# Patient Record
Sex: FEMALE | Race: WHITE | Employment: OTHER | URBAN - METROPOLITAN AREA
[De-identification: names, ages, dates, MRNs, and addresses within clinical notes are randomized per-mention and may not be internally consistent; named-entity substitution may affect disease eponyms.]

---

## 2017-03-16 ENCOUNTER — OFFICE VISIT (OUTPATIENT)
Dept: OBGYN | Age: 67
End: 2017-03-16

## 2017-03-16 VITALS
DIASTOLIC BLOOD PRESSURE: 78 MMHG | SYSTOLIC BLOOD PRESSURE: 126 MMHG | HEIGHT: 67 IN | BODY MASS INDEX: 39.87 KG/M2 | WEIGHT: 254 LBS

## 2017-03-16 DIAGNOSIS — N89.8 VAGINAL DISCHARGE: Primary | ICD-10-CM

## 2017-03-16 PROCEDURE — 99213 OFFICE O/P EST LOW 20 MIN: CPT | Performed by: OBSTETRICS & GYNECOLOGY

## 2017-03-16 RX ORDER — FLUCONAZOLE 150 MG/1
150 TABLET ORAL ONCE
Qty: 1 TABLET | Refills: 1 | Status: SHIPPED | OUTPATIENT
Start: 2017-03-16 | End: 2017-03-16

## 2017-03-16 RX ORDER — CLINDAMYCIN PHOSPHATE 20 MG/G
CREAM VAGINAL
Qty: 1 TUBE | Refills: 0 | Status: SHIPPED | OUTPATIENT
Start: 2017-03-16 | End: 2017-03-23

## 2017-03-16 RX ORDER — MULTIVIT WITH MINERALS/LUTEIN
1000 TABLET ORAL DAILY
COMMUNITY

## 2017-03-27 DIAGNOSIS — N89.8 VAGINAL DISCHARGE: ICD-10-CM

## 2017-07-31 ENCOUNTER — HOSPITAL ENCOUNTER (OUTPATIENT)
Dept: PHYSICAL THERAPY | Age: 67
Setting detail: THERAPIES SERIES
Discharge: HOME | End: 2017-07-31
Payer: MEDICARE

## 2017-07-31 PROCEDURE — G8979 MOBILITY GOAL STATUS: HCPCS

## 2017-07-31 PROCEDURE — 97161 PT EVAL LOW COMPLEX 20 MIN: CPT

## 2017-07-31 PROCEDURE — 97110 THERAPEUTIC EXERCISES: CPT

## 2017-07-31 PROCEDURE — G8978 MOBILITY CURRENT STATUS: HCPCS

## 2017-07-31 ASSESSMENT — PAIN DESCRIPTION - PAIN TYPE: TYPE: CHRONIC PAIN

## 2017-07-31 ASSESSMENT — PAIN DESCRIPTION - ORIENTATION: ORIENTATION: RIGHT;OUTER

## 2017-07-31 ASSESSMENT — PAIN DESCRIPTION - DESCRIPTORS: DESCRIPTORS: ACHING

## 2017-07-31 ASSESSMENT — PAIN DESCRIPTION - DIRECTION: RADIATING_TOWARDS: DENIES N/T

## 2017-07-31 ASSESSMENT — PAIN DESCRIPTION - LOCATION: LOCATION: HIP

## 2017-08-08 ENCOUNTER — HOSPITAL ENCOUNTER (OUTPATIENT)
Dept: PHYSICAL THERAPY | Age: 67
Setting detail: THERAPIES SERIES
Discharge: HOME | End: 2017-08-08
Payer: MEDICARE

## 2017-08-08 PROCEDURE — 97110 THERAPEUTIC EXERCISES: CPT

## 2017-08-11 ENCOUNTER — HOSPITAL ENCOUNTER (OUTPATIENT)
Dept: PHYSICAL THERAPY | Age: 67
Setting detail: THERAPIES SERIES
Discharge: HOME | End: 2017-08-11
Payer: MEDICARE

## 2017-08-11 PROCEDURE — 97140 MANUAL THERAPY 1/> REGIONS: CPT

## 2017-08-11 PROCEDURE — 97110 THERAPEUTIC EXERCISES: CPT

## 2017-08-11 ASSESSMENT — PAIN DESCRIPTION - LOCATION: LOCATION: HIP

## 2017-08-11 ASSESSMENT — PAIN DESCRIPTION - DESCRIPTORS: DESCRIPTORS: ACHING

## 2017-08-11 ASSESSMENT — PAIN SCALES - GENERAL: PAINLEVEL_OUTOF10: 4

## 2017-08-11 ASSESSMENT — PAIN DESCRIPTION - ORIENTATION: ORIENTATION: RIGHT

## 2017-08-11 ASSESSMENT — PAIN DESCRIPTION - PAIN TYPE: TYPE: CHRONIC PAIN

## 2017-08-14 ENCOUNTER — HOSPITAL ENCOUNTER (OUTPATIENT)
Dept: PHYSICAL THERAPY | Age: 67
Setting detail: THERAPIES SERIES
Discharge: HOME | End: 2017-08-14
Payer: MEDICARE

## 2017-08-14 PROCEDURE — 97110 THERAPEUTIC EXERCISES: CPT

## 2017-08-14 ASSESSMENT — PAIN DESCRIPTION - DESCRIPTORS: DESCRIPTORS: ACHING

## 2017-08-14 ASSESSMENT — PAIN DESCRIPTION - ORIENTATION: ORIENTATION: RIGHT

## 2017-08-14 ASSESSMENT — PAIN DESCRIPTION - LOCATION: LOCATION: HIP

## 2017-08-16 ENCOUNTER — HOSPITAL ENCOUNTER (OUTPATIENT)
Dept: PHYSICAL THERAPY | Age: 67
Setting detail: THERAPIES SERIES
Discharge: HOME | End: 2017-08-16
Payer: MEDICARE

## 2017-08-16 PROCEDURE — 97110 THERAPEUTIC EXERCISES: CPT

## 2017-08-16 PROCEDURE — G8979 MOBILITY GOAL STATUS: HCPCS

## 2017-08-16 PROCEDURE — G8980 MOBILITY D/C STATUS: HCPCS

## 2017-08-16 ASSESSMENT — PAIN DESCRIPTION - DESCRIPTORS: DESCRIPTORS: ACHING

## 2017-08-16 ASSESSMENT — PAIN SCALES - GENERAL: PAINLEVEL_OUTOF10: 4

## 2017-08-16 ASSESSMENT — PAIN DESCRIPTION - LOCATION: LOCATION: HIP

## 2017-08-16 ASSESSMENT — PAIN DESCRIPTION - PAIN TYPE: TYPE: CHRONIC PAIN

## 2017-08-16 ASSESSMENT — PAIN DESCRIPTION - ORIENTATION: ORIENTATION: RIGHT

## 2017-08-23 ENCOUNTER — APPOINTMENT (OUTPATIENT)
Dept: PHYSICAL THERAPY | Age: 67
End: 2017-08-23
Payer: MEDICARE

## 2017-08-25 ENCOUNTER — APPOINTMENT (OUTPATIENT)
Dept: PHYSICAL THERAPY | Age: 67
End: 2017-08-25
Payer: MEDICARE

## 2017-08-30 ENCOUNTER — APPOINTMENT (OUTPATIENT)
Dept: PHYSICAL THERAPY | Age: 67
End: 2017-08-30
Payer: MEDICARE

## 2018-02-27 ENCOUNTER — OFFICE VISIT (OUTPATIENT)
Dept: OBGYN CLINIC | Age: 68
End: 2018-02-27
Payer: MEDICARE

## 2018-02-27 VITALS
BODY MASS INDEX: 39.8 KG/M2 | HEIGHT: 67 IN | DIASTOLIC BLOOD PRESSURE: 68 MMHG | WEIGHT: 253.6 LBS | SYSTOLIC BLOOD PRESSURE: 124 MMHG

## 2018-02-27 DIAGNOSIS — Z11.51 SCREENING FOR HPV (HUMAN PAPILLOMAVIRUS): ICD-10-CM

## 2018-02-27 DIAGNOSIS — Z01.419 WELL WOMAN EXAM WITH ROUTINE GYNECOLOGICAL EXAM: Primary | ICD-10-CM

## 2018-02-27 DIAGNOSIS — Z78.0 POSTMENOPAUSAL: ICD-10-CM

## 2018-02-27 DIAGNOSIS — Z12.31 SCREENING MAMMOGRAM, ENCOUNTER FOR: ICD-10-CM

## 2018-02-27 PROCEDURE — G8417 CALC BMI ABV UP PARAM F/U: HCPCS | Performed by: OBSTETRICS & GYNECOLOGY

## 2018-02-27 PROCEDURE — G8427 DOCREV CUR MEDS BY ELIG CLIN: HCPCS | Performed by: OBSTETRICS & GYNECOLOGY

## 2018-02-27 PROCEDURE — G0101 CA SCREEN;PELVIC/BREAST EXAM: HCPCS | Performed by: OBSTETRICS & GYNECOLOGY

## 2018-02-27 ASSESSMENT — PATIENT HEALTH QUESTIONNAIRE - PHQ9
2. FEELING DOWN, DEPRESSED OR HOPELESS: 0
SUM OF ALL RESPONSES TO PHQ9 QUESTIONS 1 & 2: 0
1. LITTLE INTEREST OR PLEASURE IN DOING THINGS: 0
SUM OF ALL RESPONSES TO PHQ QUESTIONS 1-9: 0

## 2018-02-27 ASSESSMENT — ENCOUNTER SYMPTOMS
COLOR CHANGE: 0
CONSTIPATION: 0
SINUS PRESSURE: 0
WHEEZING: 0
NAUSEA: 0
BLOOD IN STOOL: 0
ABDOMINAL PAIN: 0
COUGH: 0
SHORTNESS OF BREATH: 0
VOMITING: 0

## 2018-02-27 NOTE — PROGRESS NOTES
(PRINZIDE;ZESTORETIC) 10-12.5 MG per tablet       vitamin D (CHOLECALCIFEROL) 1000 UNIT TABS tablet Take 1,000 Units by mouth daily      Calcium Carbonate-Vit D-Min (CALCIUM 1200 PO) Take by mouth      aspirin 81 MG tablet Take 81 mg by mouth daily       No current facility-administered medications on file prior to visit. ALLERGIES:  Allergies as of 02/27/2018 - Review Complete 02/27/2018   Allergen Reaction Noted    Betadine [povidone iodine] Hives 06/25/2015    Sulfa antibiotics Hives 06/25/2015    Nickel Rash 06/25/2015           Gynecologic History:     No LMP recorded. Patient is postmenopausal.      ________________________________________________________________________  REVIEW OF SYSTEMS:       Review of Systems   Constitutional: Negative for chills, fatigue, fever and unexpected weight change. HENT: Negative for hearing loss, sinus pressure and tinnitus. Eyes: Negative for visual disturbance. Respiratory: Negative for cough, shortness of breath and wheezing. Cardiovascular: Negative for chest pain, palpitations and leg swelling. Gastrointestinal: Negative for abdominal pain, blood in stool, constipation, nausea and vomiting. Genitourinary: Negative for difficulty urinating, dysuria, flank pain, hematuria, pelvic pain and vaginal discharge. Musculoskeletal: Negative for arthralgias and neck stiffness. Skin: Negative for color change, pallor and rash. Allergic/Immunologic: Negative for food allergies. Neurological: Negative for dizziness, speech difficulty, weakness and numbness. Psychiatric/Behavioral: Negative for behavioral problems, self-injury and suicidal ideas. The patient is not nervous/anxious.                                                                                                                                                    PHYSICAL Exam:    Constitutional:  Vitals:    02/27/18 1344   BP: 124/68   Weight: 253 lb 9.6 oz (115 kg)   Height: 5' 7\"

## 2018-02-28 LAB — PAP SMEAR: NORMAL

## 2018-03-02 ENCOUNTER — HOSPITAL ENCOUNTER (OUTPATIENT)
Dept: WOMENS IMAGING | Age: 68
Discharge: HOME | End: 2018-03-04
Payer: MEDICARE

## 2018-03-02 DIAGNOSIS — Z78.0 POSTMENOPAUSAL: ICD-10-CM

## 2018-03-02 DIAGNOSIS — Z12.31 SCREENING MAMMOGRAM, ENCOUNTER FOR: ICD-10-CM

## 2018-03-02 PROCEDURE — 77080 DXA BONE DENSITY AXIAL: CPT

## 2018-03-02 PROCEDURE — 77067 SCR MAMMO BI INCL CAD: CPT

## 2018-03-14 DIAGNOSIS — Z11.51 SCREENING FOR HPV (HUMAN PAPILLOMAVIRUS): ICD-10-CM

## 2018-03-14 DIAGNOSIS — Z01.419 WELL WOMAN EXAM WITH ROUTINE GYNECOLOGICAL EXAM: ICD-10-CM

## 2018-03-16 ENCOUNTER — TELEPHONE (OUTPATIENT)
Dept: OBGYN CLINIC | Age: 68
End: 2018-03-16

## 2019-04-16 ENCOUNTER — OFFICE VISIT (OUTPATIENT)
Dept: OBGYN CLINIC | Age: 69
End: 2019-04-16
Payer: MEDICARE

## 2019-04-16 VITALS
WEIGHT: 251 LBS | SYSTOLIC BLOOD PRESSURE: 108 MMHG | DIASTOLIC BLOOD PRESSURE: 70 MMHG | HEIGHT: 67 IN | BODY MASS INDEX: 39.39 KG/M2

## 2019-04-16 DIAGNOSIS — Z12.31 SCREENING MAMMOGRAM, ENCOUNTER FOR: ICD-10-CM

## 2019-04-16 DIAGNOSIS — Z01.419 ENCOUNTER FOR WELL WOMAN EXAM: Primary | ICD-10-CM

## 2019-04-16 PROCEDURE — G0101 CA SCREEN;PELVIC/BREAST EXAM: HCPCS | Performed by: OBSTETRICS & GYNECOLOGY

## 2019-04-16 ASSESSMENT — ENCOUNTER SYMPTOMS
VOMITING: 0
ANAL BLEEDING: 0
EYES NEGATIVE: 1
ABDOMINAL DISTENTION: 0
CONSTIPATION: 0
RESPIRATORY NEGATIVE: 1
ABDOMINAL PAIN: 0
NAUSEA: 0
BLOOD IN STOOL: 0
RECTAL PAIN: 0
ALLERGIC/IMMUNOLOGIC NEGATIVE: 1
DIARRHEA: 0

## 2019-04-16 ASSESSMENT — PATIENT HEALTH QUESTIONNAIRE - PHQ9
SUM OF ALL RESPONSES TO PHQ QUESTIONS 1-9: 0
2. FEELING DOWN, DEPRESSED OR HOPELESS: 0
SUM OF ALL RESPONSES TO PHQ QUESTIONS 1-9: 0

## 2019-04-16 NOTE — PROGRESS NOTES
per session: Not on file    Stress: Not on file   Relationships    Social connections:     Talks on phone: Not on file     Gets together: Not on file     Attends Gnosticism service: Not on file     Active member of club or organization: Not on file     Attends meetings of clubs or organizations: Not on file     Relationship status: Not on file    Intimate partner violence:     Fear of current or ex partner: Not on file     Emotionally abused: Not on file     Physically abused: Not on file     Forced sexual activity: Not on file   Other Topics Concern    Not on file   Social History Narrative    Not on file     Family History   Problem Relation Age of Onset    Cancer Father     Cancer Mother     Breast Cancer Neg Hx     Colon Cancer Neg Hx     Diabetes Neg Hx     Eclampsia Neg Hx     Hypertension Neg Hx     Ovarian Cancer Neg Hx      Labor Neg Hx     Spont Abortions Neg Hx     Stroke Neg Hx     ADHD Neg Hx     Alcohol Abuse Neg Hx     Allergic Rhinitis Neg Hx     Allergies Neg Hx     Allergy (Severe) Neg Hx     Alzheimer's Disease Neg Hx     Amblyopia Neg Hx     Anemia Neg Hx     Anesth Problems Neg Hx     Angioedema Neg Hx     Anxiety Disorder Neg Hx     Arrhythmia Neg Hx     Arthritis Neg Hx     Asthma Neg Hx     Ataxia Neg Hx     Atopy Neg Hx     Bipolar Disorder Neg Hx     Birth Defects Neg Hx     Bleeding Prob Neg Hx     Blindness Neg Hx     Brain Cancer Neg Hx     BRCA 1/2 Neg Hx     Broken Bones Neg Hx     Cataracts Neg Hx     Celiac Disease Neg Hx     Cervical Cancer Neg Hx     Chdhd Hrt Surg Neg Hx     Chdhd Resp Dis Neg Hx     Chorea Neg Hx     Chronic Infections Neg Hx     Cirrhosis Neg Hx     Clotting Disorder Neg Hx     Collagen Disease Neg Hx     Colon Polyps Neg Hx     COPD Neg Hx     Coronary Art Dis Neg Hx     Cowden Syndrome Neg Hx     Crohn's Disease Neg Hx     Cystic Fibrosis Neg Hx     Dementia Neg Hx     Depression Neg Hx     LESLEY Usage Neg Hx     Dislocations Neg Hx     Down Syndrome Neg Hx     Drug Abuse Neg Hx     Early Death Neg Hx     Eczema Neg Hx     Elevated Lipids Neg Hx     Emphysema Neg Hx     Endometrial Cancer Neg Hx     Esophageal Cancer Neg Hx     Fainting Neg Hx     Glaucoma Neg Hx      Problems Neg Hx     Hearing Loss Neg Hx     Heart Attack Neg Hx     Heart Defect Neg Hx     Heart Disease Neg Hx     Heart Failure Neg Hx     Heart Surgery Neg Hx     Hemochromatosis Neg Hx     High Blood Pressure Neg Hx     High Cholesterol Neg Hx     Hypotension Neg Hx     Immunodeficiency Neg Hx     Infertility Neg Hx     Inflam Bowel Dis Neg Hx     Irritable Bowel Syndrome Neg Hx     Kidney Cancer Neg Hx     Kidney Disease Neg Hx     Learning Disabilities Neg Hx     Li-Fraumeni Syndrome Neg Hx     Liver Cancer Neg Hx     Liver Disease Neg Hx     Lung Cancer Neg Hx     Lupus Neg Hx     Macular Degen Neg Hx     Malig Hyperten Neg Hx     Malig Hypertherm Neg Hx     Marfan Syndrome Neg Hx     Memory Loss Neg Hx     Mental Illness Neg Hx     Mental Retardation Neg Hx     Migraines Neg Hx     Miscarriages / Stillbirths Neg Hx     Mult Sclerosis Neg Hx     Neurofibromatosis Neg Hx     Neuropathy Neg Hx     Obesity Neg Hx     OCD Neg Hx     Osteoarthritis Neg Hx     Osteoporosis Neg Hx     Other Neg Hx     Pacemaker Neg Hx     Paranoid Behavior Neg Hx     Parkinsonism Neg Hx     Physical Abuse Neg Hx     PKU Neg Hx     Premature Birth Neg Hx     Prostate Cancer Neg Hx     Pseudochol.  Deficiency Neg Hx     Psoriasis Neg Hx     Rashes/Skin Problems Neg Hx     Rectal Cancer Neg Hx     Retinal Detachment Neg Hx     Rheum Arthritis Neg Hx     Rheumatologic Disease Neg Hx     Schizophrenia Neg Hx     Scoliosis Neg Hx     Seizures Neg Hx     Severe Sprains Neg Hx     Sexual Abuse Neg Hx     Sickle Cell Anemia Neg Hx     Sickle Cell Trait Neg Hx     SIDS Neg Hx     Stillborn Neg Hx  Stomach Cancer Neg Hx     Strabismus Neg Hx     Substance Abuse Neg Hx     Sudden Death Neg Hx     Thyroid Cancer Neg Hx     Thyroid Disease Neg Hx     Tuberculosis Neg Hx     Ulcerative Colitis Neg Hx     Urolithiasis Neg Hx     Urticaria Neg Hx     Uterine Cancer Neg Hx     Vaginal Cancer Neg Hx     Vision Loss Neg Hx     Lio's Disease Neg Hx        Review of Systems   Constitutional: Negative. Negative for activity change, appetite change, chills, diaphoresis, fatigue, fever and unexpected weight change. HENT: Negative. Eyes: Negative. Respiratory: Negative. Cardiovascular: Negative. Gastrointestinal: Negative for abdominal distention, abdominal pain, anal bleeding, blood in stool, constipation, diarrhea, nausea, rectal pain and vomiting. Endocrine: Negative. Genitourinary: Negative for decreased urine volume, difficulty urinating, dyspareunia, dysuria, enuresis, flank pain, frequency, genital sores, hematuria, menstrual problem, pelvic pain, urgency, vaginal bleeding, vaginal discharge and vaginal pain. Musculoskeletal: Negative. Skin: Negative. Allergic/Immunologic: Negative. Neurological: Negative. Hematological: Negative. Psychiatric/Behavioral: Negative. Objective:     Physical Exam   Constitutional: She is oriented to person, place, and time. She appears well-developed and well-nourished. HENT:   Head: Normocephalic. Neck: Normal range of motion. Neck supple. No thyromegaly present. Cardiovascular: Normal rate, regular rhythm and normal heart sounds. Pulmonary/Chest: Effort normal and breath sounds normal. No respiratory distress. She has no wheezes. She has no rales. She exhibits no tenderness. Right breast exhibits no mass, no nipple discharge, no skin change and no tenderness. Left breast exhibits no mass, no nipple discharge, no skin change and no tenderness. No breast swelling, tenderness, discharge or bleeding. Abdominal: Soft. Bowel sounds are normal. She exhibits no distension and no mass. There is no tenderness. There is no rebound and no guarding. Hernia confirmed negative in the right inguinal area and confirmed negative in the left inguinal area. Genitourinary: Rectum normal, vagina normal and uterus normal. No breast swelling, tenderness, discharge or bleeding. No labial fusion. There is no rash, tenderness, lesion or injury on the right labia. There is no rash, tenderness, lesion or injury on the left labia. Uterus is not deviated, not enlarged, not fixed and not tender. Cervix exhibits no motion tenderness, no discharge and no friability. Right adnexum displays no mass, no tenderness and no fullness. Left adnexum displays no mass, no tenderness and no fullness. No erythema, tenderness or bleeding in the vagina. No foreign body in the vagina. No signs of injury around the vagina. No vaginal discharge found. Musculoskeletal: Normal range of motion. She exhibits no edema or tenderness. Lymphadenopathy:     She has no cervical adenopathy. Right: No inguinal adenopathy present. Left: No inguinal adenopathy present. Neurological: She is alert and oriented to person, place, and time. Skin: Skin is warm and dry. No rash noted. No erythema. No pallor. Psychiatric: She has a normal mood and affect. Her behavior is normal. Judgment and thought content normal.       Assessment:      Diagnosis Orders   1. Encounter for well woman exam     2. Screening mammogram, encounter for  MICHAEL DIGITAL SCREEN W CAD BILATERAL         Plan:      Medications placedthis encounter:  No orders of the defined types were placed in this encounter.         Orders placedthis encounter:  Orders Placed This Encounter   Procedures    MICHAEL DIGITAL SCREEN W CAD BILATERAL     Standing Status:   Future     Standing Expiration Date:   4/15/2020         Follow up:  Return in about 1 year (around 4/16/2020) for Annual.

## 2019-04-16 NOTE — PROGRESS NOTES
The patient was asked if she would like a chaperone present for her intimate exam. She  Declined the chaperone.  Denise Banda

## 2019-04-19 ENCOUNTER — HOSPITAL ENCOUNTER (OUTPATIENT)
Dept: WOMENS IMAGING | Age: 69
Discharge: HOME | End: 2019-04-21
Payer: MEDICARE

## 2019-04-19 DIAGNOSIS — Z12.31 SCREENING MAMMOGRAM, ENCOUNTER FOR: ICD-10-CM

## 2019-04-19 PROCEDURE — 77067 SCR MAMMO BI INCL CAD: CPT

## 2020-04-28 ENCOUNTER — OFFICE VISIT (OUTPATIENT)
Dept: OBGYN CLINIC | Age: 70
End: 2020-04-28
Payer: MEDICARE

## 2020-04-28 VITALS
SYSTOLIC BLOOD PRESSURE: 132 MMHG | WEIGHT: 250 LBS | BODY MASS INDEX: 39.24 KG/M2 | DIASTOLIC BLOOD PRESSURE: 68 MMHG | HEIGHT: 67 IN

## 2020-04-28 PROCEDURE — G0101 CA SCREEN;PELVIC/BREAST EXAM: HCPCS | Performed by: OBSTETRICS & GYNECOLOGY

## 2020-04-28 ASSESSMENT — ENCOUNTER SYMPTOMS
EYES NEGATIVE: 1
RECTAL PAIN: 0
RESPIRATORY NEGATIVE: 1
NAUSEA: 0
BLOOD IN STOOL: 0
ABDOMINAL PAIN: 0
DIARRHEA: 0
CONSTIPATION: 0
ANAL BLEEDING: 0
VOMITING: 0
ABDOMINAL DISTENTION: 0
ALLERGIC/IMMUNOLOGIC NEGATIVE: 1

## 2020-04-28 ASSESSMENT — PATIENT HEALTH QUESTIONNAIRE - PHQ9
SUM OF ALL RESPONSES TO PHQ QUESTIONS 1-9: 0
SUM OF ALL RESPONSES TO PHQ9 QUESTIONS 1 & 2: 0
1. LITTLE INTEREST OR PLEASURE IN DOING THINGS: 0
SUM OF ALL RESPONSES TO PHQ QUESTIONS 1-9: 0
2. FEELING DOWN, DEPRESSED OR HOPELESS: 0

## 2020-04-28 NOTE — PROGRESS NOTES
Subjective:      Patient ID: Mello Colbert is a 68 y.o. female    Annual exam.  No PMB. No GYN complaints. Pap deferred. Screening mammogram ordered. Monthly SBE encouraged. Dexa scan ordered per protocol. Screening colonoscopy recommended per routine  . F/U annual exam or prn. Vitals:  Ht 5' 7\" (1.702 m)   Wt 250 lb (113.4 kg)   BMI 39.16 kg/m²   Past Medical History:   Diagnosis Date    Hypertension      Past Surgical History:   Procedure Laterality Date    COLONOSCOPY  02/20/2018    TUBAL LIGATION       Allergies:  Betadine [povidone iodine]; Sulfa antibiotics; and Nickel  Current Outpatient Medications   Medication Sig Dispense Refill    vitamin E 1000 UNITS capsule Take 1,000 Units by mouth daily      gabapentin (NEURONTIN) 100 MG capsule TK 1 C PO HS. MAY. INCREASE TO 3 CS HS AS TOLERATED  0    lisinopril-hydrochlorothiazide (PRINZIDE;ZESTORETIC) 10-12.5 MG per tablet       vitamin D (CHOLECALCIFEROL) 1000 UNIT TABS tablet Take 1,000 Units by mouth daily      Calcium Carbonate-Vit D-Min (CALCIUM 1200 PO) Take by mouth      aspirin 81 MG tablet Take 81 mg by mouth daily       No current facility-administered medications for this visit. Social History     Socioeconomic History    Marital status:      Spouse name: Not on file    Number of children: Not on file    Years of education: Not on file    Highest education level: Not on file   Occupational History    Not on file   Social Needs    Financial resource strain: Not on file    Food insecurity     Worry: Not on file     Inability: Not on file    Transportation needs     Medical: Not on file     Non-medical: Not on file   Tobacco Use    Smoking status: Former Smoker    Smokeless tobacco: Never Used   Substance and Sexual Activity    Alcohol use:  Yes     Alcohol/week: 0.0 standard drinks    Drug use: No    Sexual activity: Not Currently   Lifestyle    Physical activity     Days per week: Not on file     Minutes Usage Neg Hx     Dislocations Neg Hx     Down Syndrome Neg Hx     Drug Abuse Neg Hx     Early Death Neg Hx     Eczema Neg Hx     Elevated Lipids Neg Hx     Emphysema Neg Hx     Endometrial Cancer Neg Hx     Esophageal Cancer Neg Hx     Fainting Neg Hx     Glaucoma Neg Hx      Problems Neg Hx     Hearing Loss Neg Hx     Heart Attack Neg Hx     Heart Defect Neg Hx     Heart Disease Neg Hx     Heart Failure Neg Hx     Heart Surgery Neg Hx     Hemochromatosis Neg Hx     High Blood Pressure Neg Hx     High Cholesterol Neg Hx     Hypotension Neg Hx     Immunodeficiency Neg Hx     Infertility Neg Hx     Inflam Bowel Dis Neg Hx     Irritable Bowel Syndrome Neg Hx     Kidney Cancer Neg Hx     Kidney Disease Neg Hx     Learning Disabilities Neg Hx     Li-Fraumeni Syndrome Neg Hx     Liver Cancer Neg Hx     Liver Disease Neg Hx     Lung Cancer Neg Hx     Lupus Neg Hx     Macular Degen Neg Hx     Malig Hyperten Neg Hx     Malig Hypertherm Neg Hx     Marfan Syndrome Neg Hx     Memory Loss Neg Hx     Mental Illness Neg Hx     Mental Retardation Neg Hx     Migraines Neg Hx     Miscarriages / Stillbirths Neg Hx     Mult Sclerosis Neg Hx     Neurofibromatosis Neg Hx     Neuropathy Neg Hx     Obesity Neg Hx     OCD Neg Hx     Osteoarthritis Neg Hx     Osteoporosis Neg Hx     Other Neg Hx     Pacemaker Neg Hx     Paranoid Behavior Neg Hx     Parkinsonism Neg Hx     Physical Abuse Neg Hx     PKU Neg Hx     Premature Birth Neg Hx     Prostate Cancer Neg Hx     Pseudochol.  Deficiency Neg Hx     Psoriasis Neg Hx     Rashes/Skin Problems Neg Hx     Rectal Cancer Neg Hx     Retinal Detachment Neg Hx     Rheum Arthritis Neg Hx     Rheumatologic Disease Neg Hx     Schizophrenia Neg Hx     Scoliosis Neg Hx     Seizures Neg Hx     Severe Sprains Neg Hx     Sexual Abuse Neg Hx     Sickle Cell Anemia Neg Hx     Sickle Cell Trait Neg Hx     SIDS Neg Hx     Stillborn Neg Hx  Stomach Cancer Neg Hx     Strabismus Neg Hx     Substance Abuse Neg Hx     Sudden Death Neg Hx     Thyroid Cancer Neg Hx     Thyroid Disease Neg Hx     Tuberculosis Neg Hx     Ulcerative Colitis Neg Hx     Urolithiasis Neg Hx     Urticaria Neg Hx     Uterine Cancer Neg Hx     Vaginal Cancer Neg Hx     Vision Loss Neg Hx     Lio's Disease Neg Hx        Review of Systems   Constitutional: Negative. Negative for activity change, appetite change, chills, diaphoresis, fatigue, fever and unexpected weight change. HENT: Negative. Eyes: Negative. Respiratory: Negative. Cardiovascular: Negative. Gastrointestinal: Negative for abdominal distention, abdominal pain, anal bleeding, blood in stool, constipation, diarrhea, nausea, rectal pain and vomiting. Endocrine: Negative. Genitourinary: Negative for decreased urine volume, difficulty urinating, dyspareunia, dysuria, enuresis, flank pain, frequency, genital sores, hematuria, menstrual problem, pelvic pain, urgency, vaginal bleeding, vaginal discharge and vaginal pain. Musculoskeletal: Negative. Skin: Negative. Allergic/Immunologic: Negative. Neurological: Negative. Hematological: Negative. Psychiatric/Behavioral: Negative. Objective:     Physical Exam  Constitutional:       Appearance: She is well-developed. HENT:      Head: Normocephalic. Neck:      Musculoskeletal: Normal range of motion and neck supple. Thyroid: No thyromegaly. Cardiovascular:      Rate and Rhythm: Normal rate and regular rhythm. Heart sounds: Normal heart sounds. Pulmonary:      Effort: Pulmonary effort is normal. No respiratory distress. Breath sounds: Normal breath sounds. No wheezing or rales. Chest:      Chest wall: No tenderness. Breasts:         Right: No mass, nipple discharge, skin change or tenderness. Left: No mass, nipple discharge, skin change or tenderness.    Abdominal:      General: Bowel guidelines. Mammograms yearly starting at age 36. Calcium and Vitamin D dosing reviewed ( age appropriate ). Colonoscopy and bone density screening discussed ( age appropriate ). Birth control and STD prevention discussed ( age appropriate ). Gardisil counseling completed for all patients 7-35 yo. Routine health maintenance ( per PCP and guidelines ).

## 2020-06-01 ENCOUNTER — HOSPITAL ENCOUNTER (OUTPATIENT)
Dept: WOMENS IMAGING | Age: 70
Discharge: HOME | End: 2020-06-03
Payer: MEDICARE

## 2020-06-01 PROCEDURE — 77067 SCR MAMMO BI INCL CAD: CPT

## 2021-04-29 ENCOUNTER — OFFICE VISIT (OUTPATIENT)
Dept: OBGYN CLINIC | Age: 71
End: 2021-04-29

## 2021-04-29 VITALS
BODY MASS INDEX: 38.04 KG/M2 | SYSTOLIC BLOOD PRESSURE: 128 MMHG | WEIGHT: 251 LBS | HEIGHT: 68 IN | DIASTOLIC BLOOD PRESSURE: 72 MMHG

## 2021-04-29 DIAGNOSIS — Z78.0 POSTMENOPAUSE: ICD-10-CM

## 2021-04-29 DIAGNOSIS — Z12.31 SCREENING MAMMOGRAM, ENCOUNTER FOR: ICD-10-CM

## 2021-04-29 DIAGNOSIS — Z01.419 WELL FEMALE EXAM WITH ROUTINE GYNECOLOGICAL EXAM: Primary | ICD-10-CM

## 2021-04-29 PROCEDURE — G0101 CA SCREEN;PELVIC/BREAST EXAM: HCPCS | Performed by: OBSTETRICS & GYNECOLOGY

## 2021-04-29 ASSESSMENT — ENCOUNTER SYMPTOMS
BLOOD IN STOOL: 0
NAUSEA: 0
ABDOMINAL DISTENTION: 0
RECTAL PAIN: 0
CONSTIPATION: 0
ALLERGIC/IMMUNOLOGIC NEGATIVE: 1
ANAL BLEEDING: 0
DIARRHEA: 0
EYES NEGATIVE: 1
ABDOMINAL PAIN: 0
RESPIRATORY NEGATIVE: 1
VOMITING: 0

## 2021-04-29 ASSESSMENT — PATIENT HEALTH QUESTIONNAIRE - PHQ9
1. LITTLE INTEREST OR PLEASURE IN DOING THINGS: 0
SUM OF ALL RESPONSES TO PHQ QUESTIONS 1-9: 0
SUM OF ALL RESPONSES TO PHQ9 QUESTIONS 1 & 2: 0
2. FEELING DOWN, DEPRESSED OR HOPELESS: 0

## 2021-04-29 ASSESSMENT — VISUAL ACUITY: OU: 1

## 2021-04-29 NOTE — PROGRESS NOTES
Social connections     Talks on phone: Not on file     Gets together: Not on file     Attends Roman [employer] service: Not on file     Active member of club or organization: Not on file     Attends meetings of clubs or organizations: Not on file     Relationship status: Not on file    Intimate partner violence     Fear of current or ex partner: Not on file     Emotionally abused: Not on file     Physically abused: Not on file     Forced sexual activity: Not on file   Other Topics Concern    Not on file   Social History Narrative    Not on file     Family History   Problem Relation Age of Onset    Cancer Father     Cancer Mother     Breast Cancer Neg Hx     Colon Cancer Neg Hx     Diabetes Neg Hx     Eclampsia Neg Hx     Hypertension Neg Hx     Ovarian Cancer Neg Hx      Labor Neg Hx     Spont Abortions Neg Hx     Stroke Neg Hx     ADHD Neg Hx     Alcohol Abuse Neg Hx     Allergic Rhinitis Neg Hx     Allergies Neg Hx     Allergy (Severe) Neg Hx     Alzheimer's Disease Neg Hx     Amblyopia Neg Hx     Anemia Neg Hx     Anesth Problems Neg Hx     Angioedema Neg Hx     Anxiety Disorder Neg Hx     Arrhythmia Neg Hx     Arthritis Neg Hx     Asthma Neg Hx     Ataxia Neg Hx     Atopy Neg Hx     Bipolar Disorder Neg Hx     Birth Defects Neg Hx     Bleeding Prob Neg Hx     Blindness Neg Hx     Brain Cancer Neg Hx     BRCA 1/2 Neg Hx     Broken Bones Neg Hx     Cataracts Neg Hx     Celiac Disease Neg Hx     Cervical Cancer Neg Hx     Chdhd Hrt Surg Neg Hx     Chdhd Resp Dis Neg Hx     Chorea Neg Hx     Chronic Infections Neg Hx     Cirrhosis Neg Hx     Clotting Disorder Neg Hx     Collagen Disease Neg Hx     Colon Polyps Neg Hx     COPD Neg Hx     Coronary Art Dis Neg Hx     Cowden Syndrome Neg Hx     Crohn's Disease Neg Hx     Cystic Fibrosis Neg Hx     Dementia Neg Hx     Depression Neg Hx     LESLEY Usage Neg Hx     Dislocations Neg Hx     Down Syndrome Neg Hx     Drug Abuse Neg Hx     Early Death Neg Hx     Eczema Neg Hx     Elevated Lipids Neg Hx     Emphysema Neg Hx     Endometrial Cancer Neg Hx     Esophageal Cancer Neg Hx     Fainting Neg Hx     Glaucoma Neg Hx      Problems Neg Hx     Hearing Loss Neg Hx     Heart Attack Neg Hx     Heart Defect Neg Hx     Heart Disease Neg Hx     Heart Failure Neg Hx     Heart Surgery Neg Hx     Hemochromatosis Neg Hx     High Blood Pressure Neg Hx     High Cholesterol Neg Hx     Hypotension Neg Hx     Immunodeficiency Neg Hx     Infertility Neg Hx     Inflam Bowel Dis Neg Hx     Irritable Bowel Syndrome Neg Hx     Kidney Cancer Neg Hx     Kidney Disease Neg Hx     Learning Disabilities Neg Hx     Li-Fraumeni Syndrome Neg Hx     Liver Cancer Neg Hx     Liver Disease Neg Hx     Lung Cancer Neg Hx     Lupus Neg Hx     Macular Degen Neg Hx     Malig Hyperten Neg Hx     Malig Hypertherm Neg Hx     Marfan Syndrome Neg Hx     Memory Loss Neg Hx     Mental Illness Neg Hx     Mental Retardation Neg Hx     Migraines Neg Hx     Miscarriages / Stillbirths Neg Hx     Mult Sclerosis Neg Hx     Neurofibromatosis Neg Hx     Neuropathy Neg Hx     Obesity Neg Hx     OCD Neg Hx     Osteoarthritis Neg Hx     Osteoporosis Neg Hx     Other Neg Hx     Pacemaker Neg Hx     Paranoid Behavior Neg Hx     Parkinsonism Neg Hx     Physical Abuse Neg Hx     PKU Neg Hx     Premature Birth Neg Hx     Prostate Cancer Neg Hx     Pseudochol.  Deficiency Neg Hx     Psoriasis Neg Hx     Rashes/Skin Problems Neg Hx     Rectal Cancer Neg Hx     Retinal Detachment Neg Hx     Rheum Arthritis Neg Hx     Rheumatologic Disease Neg Hx     Schizophrenia Neg Hx     Scoliosis Neg Hx     Seizures Neg Hx     Severe Sprains Neg Hx     Sexual Abuse Neg Hx     Sickle Cell Anemia Neg Hx     Sickle Cell Trait Neg Hx     SIDS Neg Hx     Stillborn Neg Hx     Stomach Cancer Neg Hx     Strabismus Neg Hx     Substance Abuse Neg Hx     Sudden Death Neg Hx     Thyroid Cancer Neg Hx     Thyroid Disease Neg Hx     Tuberculosis Neg Hx     Ulcerative Colitis Neg Hx     Urolithiasis Neg Hx     Urticaria Neg Hx     Uterine Cancer Neg Hx     Vaginal Cancer Neg Hx     Vision Loss Neg Hx     Lio's Disease Neg Hx        Review of Systems   Constitutional: Negative. Negative for activity change, appetite change, chills, diaphoresis, fatigue, fever and unexpected weight change. HENT: Negative. Eyes: Negative. Respiratory: Negative. Cardiovascular: Negative. Gastrointestinal: Negative for abdominal distention, abdominal pain, anal bleeding, blood in stool, constipation, diarrhea, nausea, rectal pain and vomiting. Endocrine: Negative. Genitourinary: Negative for decreased urine volume, difficulty urinating, dyspareunia, dysuria, enuresis, flank pain, frequency, genital sores, hematuria, menstrual problem, pelvic pain, urgency, vaginal bleeding, vaginal discharge and vaginal pain. Musculoskeletal: Negative. Skin: Negative. Allergic/Immunologic: Negative. Neurological: Negative. Hematological: Negative. Psychiatric/Behavioral: Negative. Objective:     Physical Exam  Constitutional:       Appearance: She is well-developed. HENT:      Head: Normocephalic. Eyes:      General: Lids are normal. Vision grossly intact. Neck:      Musculoskeletal: Normal range of motion and neck supple. Thyroid: No thyromegaly. Cardiovascular:      Rate and Rhythm: Normal rate and regular rhythm. Heart sounds: Normal heart sounds. Pulmonary:      Effort: Pulmonary effort is normal. No respiratory distress. Breath sounds: Normal breath sounds. No wheezing or rales. Chest:      Chest wall: No tenderness. Breasts:         Right: Normal. No swelling, bleeding, inverted nipple, mass, nipple discharge, skin change or tenderness.          Left: Normal. No swelling, bleeding, inverted nipple, mass, nipple discharge, skin change or tenderness. Abdominal:      General: There is no distension. Palpations: Abdomen is soft. There is no mass. Tenderness: There is no abdominal tenderness. There is no guarding or rebound. Hernia: No hernia is present. There is no hernia in the left inguinal area or right inguinal area. Genitourinary:     General: Normal vulva. Pubic Area: No rash. Labia:         Right: No rash, tenderness, lesion or injury. Left: No rash, tenderness, lesion or injury. Urethra: No prolapse, urethral swelling or urethral lesion. Vagina: Normal. No signs of injury and foreign body. No vaginal discharge, erythema, tenderness or bleeding. Cervix: No cervical motion tenderness, discharge or friability. Uterus: Not deviated, not enlarged, not fixed and not tender. Adnexa:         Right: No mass, tenderness or fullness. Left: No mass, tenderness or fullness. Rectum: Normal.   Musculoskeletal: Normal range of motion. General: No tenderness. Lymphadenopathy:      Cervical: No cervical adenopathy. Upper Body:      Right upper body: No supraclavicular or axillary adenopathy. Left upper body: No supraclavicular or axillary adenopathy. Lower Body: No right inguinal adenopathy. No left inguinal adenopathy. Skin:     General: Skin is warm and dry. Coloration: Skin is not pale. Findings: No erythema or rash. Neurological:      Mental Status: She is alert and oriented to person, place, and time. Psychiatric:         Behavior: Behavior normal.         Thought Content: Thought content normal.         Judgment: Judgment normal.         Assessment:       Diagnosis Orders   1. Well female exam with routine gynecological exam     2. Screening mammogram, encounter for  MICHAEL DIGITAL SCREEN W OR WO CAD BILATERAL   3.  Postmenopause  DEXA BONE DENSITY AXIAL SKELETON        Plan:      Medications placedthis encounter:  No orders of the defined types were placed in this encounter. Orders placedthis encounter:  Orders Placed This Encounter   Procedures    MICHAEL DIGITAL SCREEN W OR WO CAD BILATERAL     Standing Status:   Future     Standing Expiration Date:   4/29/2022    DEXA BONE DENSITY AXIAL SKELETON     Standing Status:   Future     Standing Expiration Date:   4/29/2022         Follow up:  Return in about 1 year (around 4/29/2022) for Annual.   Repeat Annual GYN exam every 1 year. Cervical Cytology exam starts age 24. If Negative Cytology;  follow-up screening per current guidelines. Mammograms yearly starting at age 36. Calcium and Vitamin D dosing reviewed ( age appropriate ). Colonoscopy and bone density screening discussed ( age appropriate ). Birth control and STD prevention discussed ( age appropriate ). Gardisil counseling completed for all patients ( age appropriate ). Routine health maintenance ( per PCP and guidelines ). The patient was asked if she would like a chaperone present for her intimate exam. She  declines the chaperone.  Park Callahan

## 2021-06-11 ENCOUNTER — HOSPITAL ENCOUNTER (OUTPATIENT)
Dept: WOMENS IMAGING | Age: 71
Discharge: HOME | End: 2021-06-13
Payer: MEDICARE

## 2021-06-11 VITALS — BODY MASS INDEX: 38.16 KG/M2 | HEIGHT: 68 IN

## 2021-06-11 DIAGNOSIS — Z78.0 POSTMENOPAUSE: ICD-10-CM

## 2021-06-11 DIAGNOSIS — Z12.31 SCREENING MAMMOGRAM, ENCOUNTER FOR: ICD-10-CM

## 2021-06-11 PROCEDURE — 77063 BREAST TOMOSYNTHESIS BI: CPT

## 2021-06-11 PROCEDURE — 77080 DXA BONE DENSITY AXIAL: CPT

## 2021-06-15 DIAGNOSIS — R92.8 ABNORMAL MAMMOGRAM OF RIGHT BREAST: ICD-10-CM

## 2021-06-15 DIAGNOSIS — R92.8 ABNORMAL MAMMOGRAM OF LEFT BREAST: ICD-10-CM

## 2021-06-15 DIAGNOSIS — N64.9 BREAST DISORDER: Primary | ICD-10-CM

## 2021-06-16 ENCOUNTER — HOSPITAL ENCOUNTER (OUTPATIENT)
Dept: ULTRASOUND IMAGING | Age: 71
Discharge: HOME | End: 2021-06-18
Payer: MEDICARE

## 2021-06-16 ENCOUNTER — HOSPITAL ENCOUNTER (OUTPATIENT)
Dept: WOMENS IMAGING | Age: 71
Discharge: HOME | End: 2021-06-18
Payer: MEDICARE

## 2021-06-16 DIAGNOSIS — N64.9 BREAST DISORDER: ICD-10-CM

## 2021-06-16 DIAGNOSIS — R92.8 ABNORMAL MAMMOGRAM OF RIGHT BREAST: ICD-10-CM

## 2021-06-16 DIAGNOSIS — R92.8 ABNORMAL MAMMOGRAM OF LEFT BREAST: ICD-10-CM

## 2021-06-16 PROCEDURE — 76642 ULTRASOUND BREAST LIMITED: CPT

## 2021-06-16 PROCEDURE — G0279 TOMOSYNTHESIS, MAMMO: HCPCS

## 2021-06-22 ENCOUNTER — TELEPHONE (OUTPATIENT)
Dept: OBGYN CLINIC | Age: 71
End: 2021-06-22

## 2021-06-22 DIAGNOSIS — R92.8 ABNORMAL MAMMOGRAM OF BOTH BREASTS: Primary | ICD-10-CM

## 2021-06-22 NOTE — TELEPHONE ENCOUNTER
----- Message from Sheliah Bernheim, MD sent at 6/22/2021  9:56 AM EDT -----  Needs B/L Dx mammogram and US 6 months.

## 2022-01-06 ENCOUNTER — HOSPITAL ENCOUNTER (OUTPATIENT)
Dept: WOMENS IMAGING | Age: 72
Discharge: HOME | End: 2022-01-08
Payer: MEDICARE

## 2022-01-06 VITALS — HEIGHT: 68 IN | BODY MASS INDEX: 38.73 KG/M2

## 2022-01-06 DIAGNOSIS — R92.8 ABNORMAL MAMMOGRAM OF BOTH BREASTS: ICD-10-CM

## 2022-01-06 PROCEDURE — G0279 TOMOSYNTHESIS, MAMMO: HCPCS

## 2022-05-05 ENCOUNTER — OFFICE VISIT (OUTPATIENT)
Dept: OBGYN CLINIC | Age: 72
End: 2022-05-05
Payer: MEDICARE

## 2022-05-05 VITALS — WEIGHT: 238 LBS | SYSTOLIC BLOOD PRESSURE: 128 MMHG | BODY MASS INDEX: 36.73 KG/M2 | DIASTOLIC BLOOD PRESSURE: 68 MMHG

## 2022-05-05 DIAGNOSIS — N90.5 VULVAR ATROPHY: ICD-10-CM

## 2022-05-05 DIAGNOSIS — B36.9 FUNGAL DERMATITIS: ICD-10-CM

## 2022-05-05 DIAGNOSIS — Z12.31 SCREENING MAMMOGRAM FOR BREAST CANCER: ICD-10-CM

## 2022-05-05 DIAGNOSIS — Z01.419 ENCOUNTER FOR WELL WOMAN EXAM WITH ROUTINE GYNECOLOGICAL EXAM: Primary | ICD-10-CM

## 2022-05-05 PROCEDURE — G0101 CA SCREEN;PELVIC/BREAST EXAM: HCPCS | Performed by: OBSTETRICS & GYNECOLOGY

## 2022-05-05 PROCEDURE — 4040F PNEUMOC VAC/ADMIN/RCVD: CPT | Performed by: OBSTETRICS & GYNECOLOGY

## 2022-05-05 PROCEDURE — G8399 PT W/DXA RESULTS DOCUMENT: HCPCS | Performed by: OBSTETRICS & GYNECOLOGY

## 2022-05-05 PROCEDURE — 1036F TOBACCO NON-USER: CPT | Performed by: OBSTETRICS & GYNECOLOGY

## 2022-05-05 PROCEDURE — G8417 CALC BMI ABV UP PARAM F/U: HCPCS | Performed by: OBSTETRICS & GYNECOLOGY

## 2022-05-05 PROCEDURE — 1123F ACP DISCUSS/DSCN MKR DOCD: CPT | Performed by: OBSTETRICS & GYNECOLOGY

## 2022-05-05 PROCEDURE — 3017F COLORECTAL CA SCREEN DOC REV: CPT | Performed by: OBSTETRICS & GYNECOLOGY

## 2022-05-05 PROCEDURE — 1090F PRES/ABSN URINE INCON ASSESS: CPT | Performed by: OBSTETRICS & GYNECOLOGY

## 2022-05-05 PROCEDURE — G8427 DOCREV CUR MEDS BY ELIG CLIN: HCPCS | Performed by: OBSTETRICS & GYNECOLOGY

## 2022-05-05 PROCEDURE — 99212 OFFICE O/P EST SF 10 MIN: CPT | Performed by: OBSTETRICS & GYNECOLOGY

## 2022-05-05 RX ORDER — NYSTATIN 100000 [USP'U]/G
POWDER TOPICAL
Qty: 30 G | Refills: 0 | Status: SHIPPED | OUTPATIENT
Start: 2022-05-05 | End: 2022-09-07

## 2022-05-05 ASSESSMENT — ENCOUNTER SYMPTOMS
DIARRHEA: 0
ABDOMINAL PAIN: 0
ABDOMINAL DISTENTION: 0
VOMITING: 0
EYES NEGATIVE: 1
ANAL BLEEDING: 0
RESPIRATORY NEGATIVE: 1
RECTAL PAIN: 0
NAUSEA: 0
BLOOD IN STOOL: 0
ALLERGIC/IMMUNOLOGIC NEGATIVE: 1
CONSTIPATION: 0

## 2022-05-05 ASSESSMENT — VISUAL ACUITY: OU: 1

## 2022-05-05 NOTE — PROGRESS NOTES
Subjective:      Patient ID: Talat Rodas is a 66 y.o. female    Annual exam.  Reviewed medical, surgical, social and family history. Also reviewed current medications and allergies. No PMB. No GYN complaints. Pap deferred. Screening mammogram ordered. Monthly SBE encouraged. Dexa scan ordered per protocol. Screening colonoscopy recommended per routine. F/U annual exam or prn. Pt also wished to discuss frequent rash and itching under breasts and groin folds extending her appointment time by 10 minutes. Also c/o vulvar irritation and itching. Discussed fungal dermatitis and atrophic vulva. Discussed decreasing carbs and sugars in diet. Daily probiotic ( Acidophilus ). Instructed to keep perineal and vaginal area clean and dry. Cotton underwear and loose fitting clothing. No douching. No bubble baths or bath bombs. Rx:  Nystatin Powder and Mycolog Cream as directed under breasts and groin folds. Already has Rx for Temovate for atrophic vulvar changes. Instructed to use as directed 2-3 times per week. All questions answered. Vitals:  /68   Wt 238 lb (108 kg)   BMI 36.73 kg/m²   Past Medical History:   Diagnosis Date    Hypertension      Past Surgical History:   Procedure Laterality Date    COLONOSCOPY  02/20/2018    TUBAL LIGATION       Allergies:  Betadine [povidone iodine], Sulfa antibiotics, and Nickel  Current Outpatient Medications   Medication Sig Dispense Refill    Pyridoxine HCl (VITAMIN B-6 PO) Take by mouth      nystatin-triamcinolone (MYCOLOG II) 844127-8.1 UNIT/GM-% cream Apply topically 2 times daily. 30 g 3    nystatin (MYCOSTATIN) 137736 UNIT/GM powder Apply 3 times daily.  30 g 0    vitamin E 1000 UNITS capsule Take 1,000 Units by mouth daily      lisinopril-hydrochlorothiazide (PRINZIDE;ZESTORETIC) 10-12.5 MG per tablet       vitamin D (CHOLECALCIFEROL) 1000 UNIT TABS tablet Take 1,000 Units by mouth daily      Calcium Carbonate-Vit D-Min (CALCIUM 1200 PO) Take by mouth      aspirin 81 MG tablet Take 81 mg by mouth daily       No current facility-administered medications for this visit. Social History     Socioeconomic History    Marital status:      Spouse name: Not on file    Number of children: Not on file    Years of education: Not on file    Highest education level: Not on file   Occupational History    Not on file   Tobacco Use    Smoking status: Former Smoker    Smokeless tobacco: Never Used   Substance and Sexual Activity    Alcohol use: Yes     Alcohol/week: 0.0 standard drinks    Drug use: No    Sexual activity: Not Currently   Other Topics Concern    Not on file   Social History Narrative    Not on file     Social Determinants of Health     Financial Resource Strain:     Difficulty of Paying Living Expenses: Not on file   Food Insecurity:     Worried About Running Out of Food in the Last Year: Not on file    [place] of Food in the Last Year: Not on file   Transportation Needs:     Lack of Transportation (Medical): Not on file    Lack of Transportation (Non-Medical):  Not on file   Physical Activity:     Days of Exercise per Week: Not on file    Minutes of Exercise per Session: Not on file   Stress:     Feeling of Stress : Not on file   Social Connections:     Frequency of Communication with Friends and Family: Not on file    Frequency of Social Gatherings with Friends and Family: Not on file    Attends Yazdanism Services: Not on file    Active Member of Clubs or Organizations: Not on file    Attends Club or Organization Meetings: Not on file    Marital Status: Not on file   Intimate Partner Violence:     Fear of Current or Ex-Partner: Not on file    Emotionally Abused: Not on file    Physically Abused: Not on file    Sexually Abused: Not on file   Housing Stability:     Unable to Pay for Housing in the Last Year: Not on file    Number of [place] in the Last Year: Not on file    Unstable Housing in the Last Year: Not on file     Family History   Problem Relation Age of Onset    Cancer Father     Cancer Mother     Breast Cancer Neg Hx     Colon Cancer Neg Hx     Diabetes Neg Hx     Eclampsia Neg Hx     Hypertension Neg Hx     Ovarian Cancer Neg Hx      Labor Neg Hx     Spont Abortions Neg Hx     Stroke Neg Hx     ADHD Neg Hx     Alcohol Abuse Neg Hx     Allergic Rhinitis Neg Hx     Allergies Neg Hx     Allergy (Severe) Neg Hx     Alzheimer's Disease Neg Hx     Amblyopia Neg Hx     Anemia Neg Hx     Anesth Problems Neg Hx     Angioedema Neg Hx     Anxiety Disorder Neg Hx     Arrhythmia Neg Hx     Arthritis Neg Hx     Asthma Neg Hx     Ataxia Neg Hx     Atopy Neg Hx     Bipolar Disorder Neg Hx     Birth Defects Neg Hx     Bleeding Prob Neg Hx     Blindness Neg Hx     Brain Cancer Neg Hx     BRCA 1/2 Neg Hx     Broken Bones Neg Hx     Cataracts Neg Hx     Celiac Disease Neg Hx     Cervical Cancer Neg Hx     Chdhd Hrt Surg Neg Hx     Chdhd Resp Dis Neg Hx     Chorea Neg Hx     Chronic Infections Neg Hx     Cirrhosis Neg Hx     Clotting Disorder Neg Hx     Collagen Disease Neg Hx     Colon Polyps Neg Hx     COPD Neg Hx     Coronary Art Dis Neg Hx     Cowden Syndrome Neg Hx     Crohn's Disease Neg Hx     Cystic Fibrosis Neg Hx     Dementia Neg Hx     Depression Neg Hx     LESLEY Usage Neg Hx     Dislocations Neg Hx     Down Syndrome Neg Hx     Drug Abuse Neg Hx     Early Death Neg Hx     Eczema Neg Hx     Elevated Lipids Neg Hx     Emphysema Neg Hx     Endometrial Cancer Neg Hx     Esophageal Cancer Neg Hx     Fainting Neg Hx     Glaucoma Neg Hx      Problems Neg Hx     Hearing Loss Neg Hx     Heart Attack Neg Hx     Heart Defect Neg Hx     Heart Disease Neg Hx     Heart Failure Neg Hx     Heart Surgery Neg Hx     Hemochromatosis Neg Hx     High Blood Pressure Neg Hx     High Cholesterol Neg Hx     Hypotension Neg Hx     Immunodeficiency Neg Hx     Infertility Neg Hx     Inflam Bowel Dis Neg Hx     Irritable Bowel Syndrome Neg Hx     Kidney Cancer Neg Hx     Kidney Disease Neg Hx     Learning Disabilities Neg Hx     Li-Fraumeni Syndrome Neg Hx     Liver Cancer Neg Hx     Liver Disease Neg Hx     Lung Cancer Neg Hx     Lupus Neg Hx     Macular Degen Neg Hx     Malig Hyperten Neg Hx     Malig Hypertherm Neg Hx     Marfan Syndrome Neg Hx     Memory Loss Neg Hx     Mental Illness Neg Hx     Mental Retardation Neg Hx     Migraines Neg Hx     Miscarriages / Stillbirths Neg Hx     Mult Sclerosis Neg Hx     Neurofibromatosis Neg Hx     Neuropathy Neg Hx     Obesity Neg Hx     OCD Neg Hx     Osteoarthritis Neg Hx     Osteoporosis Neg Hx     Other Neg Hx     Pacemaker Neg Hx     Paranoid Behavior Neg Hx     Parkinsonism Neg Hx     Physical Abuse Neg Hx     PKU Neg Hx     Premature Birth Neg Hx     Prostate Cancer Neg Hx     Pseudochol. Deficiency Neg Hx     Psoriasis Neg Hx     Rashes/Skin Problems Neg Hx     Rectal Cancer Neg Hx     Retinal Detachment Neg Hx     Rheum Arthritis Neg Hx     Rheumatologic Disease Neg Hx     Schizophrenia Neg Hx     Scoliosis Neg Hx     Seizures Neg Hx     Severe Sprains Neg Hx     Sexual Abuse Neg Hx     Sickle Cell Anemia Neg Hx     Sickle Cell Trait Neg Hx     SIDS Neg Hx     Stillborn Neg Hx     Stomach Cancer Neg Hx     Strabismus Neg Hx     Substance Abuse Neg Hx     Sudden Death Neg Hx     Thyroid Cancer Neg Hx     Thyroid Disease Neg Hx     Tuberculosis Neg Hx     Ulcerative Colitis Neg Hx     Urolithiasis Neg Hx     Urticaria Neg Hx     Uterine Cancer Neg Hx     Vaginal Cancer Neg Hx     Vision Loss Neg Hx     Lio's Disease Neg Hx        Review of Systems   Constitutional: Negative. Negative for activity change, appetite change, chills, diaphoresis, fatigue, fever and unexpected weight change. HENT: Negative. Eyes: Negative.     Respiratory: Negative. Cardiovascular: Negative. Gastrointestinal: Negative for abdominal distention, abdominal pain, anal bleeding, blood in stool, constipation, diarrhea, nausea, rectal pain and vomiting. Endocrine: Negative. Genitourinary: Negative for decreased urine volume, difficulty urinating, dyspareunia, dysuria, enuresis, flank pain, frequency, genital sores, hematuria, menstrual problem, pelvic pain, urgency, vaginal bleeding, vaginal discharge and vaginal pain. Musculoskeletal: Negative. Skin: Negative. Allergic/Immunologic: Negative. Neurological: Negative. Hematological: Negative. Psychiatric/Behavioral: Negative. Objective:     Physical Exam  Constitutional:       Appearance: She is well-developed. HENT:      Head: Normocephalic. Eyes:      General: Lids are normal. Vision grossly intact. Neck:      Thyroid: No thyromegaly. Cardiovascular:      Rate and Rhythm: Normal rate and regular rhythm. Heart sounds: Normal heart sounds. Pulmonary:      Effort: Pulmonary effort is normal. No respiratory distress. Breath sounds: Normal breath sounds. No wheezing or rales. Chest:      Chest wall: No tenderness. Breasts:      Right: Normal. No swelling, bleeding, inverted nipple, mass, nipple discharge, skin change, tenderness, axillary adenopathy or supraclavicular adenopathy. Left: Normal. No swelling, bleeding, inverted nipple, mass, nipple discharge, skin change, tenderness, axillary adenopathy or supraclavicular adenopathy. Abdominal:      General: There is no distension. Palpations: Abdomen is soft. There is no mass. Tenderness: There is no abdominal tenderness. There is no guarding or rebound. Hernia: No hernia is present. There is no hernia in the left inguinal area or right inguinal area. Genitourinary:     General: Normal vulva. Pubic Area: No rash. Labia:         Right: No rash, tenderness, lesion or injury. Left: No rash, tenderness, lesion or injury. Urethra: No prolapse, urethral swelling or urethral lesion. Vagina: Normal. No signs of injury and foreign body. No vaginal discharge, erythema, tenderness or bleeding. Cervix: No cervical motion tenderness, discharge or friability. Uterus: Not deviated, not enlarged, not fixed and not tender. Adnexa:         Right: No mass, tenderness or fullness. Left: No mass, tenderness or fullness. Rectum: Normal.   Musculoskeletal:         General: No tenderness. Normal range of motion. Cervical back: Normal range of motion and neck supple. Lymphadenopathy:      Cervical: No cervical adenopathy. Upper Body:      Right upper body: No supraclavicular or axillary adenopathy. Left upper body: No supraclavicular or axillary adenopathy. Lower Body: No right inguinal adenopathy. No left inguinal adenopathy. Skin:     General: Skin is warm and dry. Coloration: Skin is not pale. Findings: No erythema or rash. Neurological:      Mental Status: She is alert and oriented to person, place, and time. Psychiatric:         Behavior: Behavior normal.         Thought Content: Thought content normal.         Judgment: Judgment normal.         Assessment:      Diagnosis Orders   1. Encounter for well woman exam with routine gynecological exam     2. Screening mammogram for breast cancer  [facility] DIGITAL SCREEN W OR WO CAD BILATERAL   3. Fungal dermatitis     4. Vulvar atrophy           Plan:      Medications placedthis encounter:  Orders Placed This Encounter   Medications    nystatin-triamcinolone (MYCOLOG II) 405903-0.1 UNIT/GM-% cream     Sig: Apply topically 2 times daily. Dispense:  30 g     Refill:  3    nystatin (MYCOSTATIN) 990078 UNIT/GM powder     Sig: Apply 3 times daily.      Dispense:  30 g     Refill:  0         Orders placedthis encounter:  Orders Placed This Encounter   Procedures    [facility] DIGITAL SCREEN W OR WO CAD BILATERAL     Standing Status:   Future     Standing Expiration Date:   5/4/2023         Follow up:  Return for Annual.   Repeat Annual GYN exam every 1 year. Cervical Cytology exam starts age 24. If Negative Cytology;  follow-up screening per current guidelines. Mammograms yearly starting at age 36. Calcium and Vitamin D dosing reviewed ( age appropriate ). Colonoscopy and bone density screening discussed ( age appropriate ). Birth control and STD prevention discussed ( age appropriate ). Gardisil counseling completed for all patients ( age appropriate ). Routine health maintenance ( per PCP and guidelines ). The patient was asked if she would like a chaperone present for her intimate exam. She  Decline the chaperone.  Echo Keita MD

## 2022-08-01 ENCOUNTER — HOSPITAL ENCOUNTER (OUTPATIENT)
Dept: MRI IMAGING | Age: 72
Discharge: HOME | End: 2022-08-03
Payer: MEDICARE

## 2022-08-01 DIAGNOSIS — S76.319A HAMSTRING STRAIN, UNSPECIFIED LATERALITY, INITIAL ENCOUNTER: ICD-10-CM

## 2022-08-01 PROCEDURE — 73721 MRI JNT OF LWR EXTRE W/O DYE: CPT

## 2022-09-07 RX ORDER — NYSTATIN 100000 [USP'U]/G
POWDER TOPICAL
Qty: 30 G | Refills: 0 | Status: SHIPPED | OUTPATIENT
Start: 2022-09-07

## 2023-01-31 ENCOUNTER — OFFICE VISIT (OUTPATIENT)
Dept: OBGYN CLINIC | Age: 73
End: 2023-01-31
Payer: MEDICARE

## 2023-01-31 VITALS
BODY MASS INDEX: 35.92 KG/M2 | WEIGHT: 237 LBS | SYSTOLIC BLOOD PRESSURE: 116 MMHG | HEIGHT: 68 IN | DIASTOLIC BLOOD PRESSURE: 70 MMHG

## 2023-01-31 DIAGNOSIS — N95.0 PMB (POSTMENOPAUSAL BLEEDING): ICD-10-CM

## 2023-01-31 DIAGNOSIS — Z12.4 CERVICAL CANCER SCREENING: ICD-10-CM

## 2023-01-31 DIAGNOSIS — Z11.51 SCREENING FOR HUMAN PAPILLOMAVIRUS: ICD-10-CM

## 2023-01-31 DIAGNOSIS — N95.0 PMB (POSTMENOPAUSAL BLEEDING): Primary | ICD-10-CM

## 2023-01-31 DIAGNOSIS — R82.998 OTHER ABNORMAL FINDINGS IN URINE: ICD-10-CM

## 2023-01-31 LAB
BILIRUBIN, POC: NORMAL
BLOOD URINE, POC: NORMAL
CLARITY, POC: NORMAL
CLUE CELLS: NORMAL
COLOR, POC: NORMAL
GLUCOSE URINE, POC: NORMAL
KETONES, POC: NORMAL
LEUKOCYTE EST, POC: NORMAL
NITRITE, POC: NORMAL
PH, POC: 6
PROTEIN, POC: NORMAL
SPECIFIC GRAVITY, POC: 1.02
TRICHOMONAS PREP: NORMAL
TRICHOMONAS VAGINALIS SCREEN: NEGATIVE
UROBILINOGEN, POC: NORMAL
YEAST WET PREP: NORMAL

## 2023-01-31 PROCEDURE — 1090F PRES/ABSN URINE INCON ASSESS: CPT | Performed by: OBSTETRICS & GYNECOLOGY

## 2023-01-31 PROCEDURE — 1123F ACP DISCUSS/DSCN MKR DOCD: CPT | Performed by: OBSTETRICS & GYNECOLOGY

## 2023-01-31 PROCEDURE — 99213 OFFICE O/P EST LOW 20 MIN: CPT | Performed by: OBSTETRICS & GYNECOLOGY

## 2023-01-31 PROCEDURE — G8427 DOCREV CUR MEDS BY ELIG CLIN: HCPCS | Performed by: OBSTETRICS & GYNECOLOGY

## 2023-01-31 PROCEDURE — G8399 PT W/DXA RESULTS DOCUMENT: HCPCS | Performed by: OBSTETRICS & GYNECOLOGY

## 2023-01-31 PROCEDURE — 81002 URINALYSIS NONAUTO W/O SCOPE: CPT | Performed by: OBSTETRICS & GYNECOLOGY

## 2023-01-31 PROCEDURE — G8417 CALC BMI ABV UP PARAM F/U: HCPCS | Performed by: OBSTETRICS & GYNECOLOGY

## 2023-01-31 PROCEDURE — G8484 FLU IMMUNIZE NO ADMIN: HCPCS | Performed by: OBSTETRICS & GYNECOLOGY

## 2023-01-31 PROCEDURE — 1036F TOBACCO NON-USER: CPT | Performed by: OBSTETRICS & GYNECOLOGY

## 2023-01-31 PROCEDURE — 3017F COLORECTAL CA SCREEN DOC REV: CPT | Performed by: OBSTETRICS & GYNECOLOGY

## 2023-01-31 RX ORDER — MAGNESIUM OXIDE 400 MG/1
TABLET ORAL
COMMUNITY
Start: 2022-06-14

## 2023-01-31 ASSESSMENT — ENCOUNTER SYMPTOMS
BLOOD IN STOOL: 0
DIARRHEA: 0
ABDOMINAL PAIN: 0
VOMITING: 0
ABDOMINAL DISTENTION: 0
ALLERGIC/IMMUNOLOGIC NEGATIVE: 1
CONSTIPATION: 0
RECTAL PAIN: 0
ANAL BLEEDING: 0
EYES NEGATIVE: 1
NAUSEA: 0
RESPIRATORY NEGATIVE: 1

## 2023-01-31 ASSESSMENT — PATIENT HEALTH QUESTIONNAIRE - PHQ9
SUM OF ALL RESPONSES TO PHQ QUESTIONS 1-9: 0
2. FEELING DOWN, DEPRESSED OR HOPELESS: 0
SUM OF ALL RESPONSES TO PHQ QUESTIONS 1-9: 0
1. LITTLE INTEREST OR PLEASURE IN DOING THINGS: 0
SUM OF ALL RESPONSES TO PHQ9 QUESTIONS 1 & 2: 0

## 2023-01-31 NOTE — PROGRESS NOTES
The patient was asked if she would like a chaperone present for her intimate exam. She  Declined the chaperone.  Scott Felty, CMA ([address] [place] [address])

## 2023-01-31 NOTE — PROGRESS NOTES
Patient here c/o PMB. Reviewed medical, surgical, social and family history. Also reviewed current medications and allergies. States last Saturday spotting started spontaneously. No cramping or pain. Became more like cycle with small cots in toilet. Lasted until today. Patient denies new sexual partners or abnormal vaginal discharge. No recent blood thinners or steroid injections. Denies new or changed meds. Denies trauma. No major weight changes or increase in stress. Ordered labs, US, pap and wet prep. Old blood noted on SSE. F/U results with [facility] w/bx. All questions answered. Vitals:  /70   Ht 5' 8\" (1.727 m)   Wt 237 lb (107.5 kg)   BMI 36.04 kg/m²   Past Medical History:   Diagnosis Date    Hypertension      Past Surgical History:   Procedure Laterality Date    COLONOSCOPY  02/20/2018    TUBAL LIGATION       Allergies:  Betadine [povidone iodine], Sulfa antibiotics, and Nickel  Current Outpatient Medications   Medication Sig Dispense Refill    magnesium oxide (MAG-OX) 400 MG tablet Take by mouth      nystatin 042987 UNIT/GM powder APPLY THREE TIMES DAILY 30 g 0    Pyridoxine HCl (VITAMIN B-6 PO) Take by mouth      nystatin-triamcinolone (MYCOLOG II) 778900-6.1 UNIT/GM-% cream Apply topically 2 times daily. 30 g 3    vitamin E 1000 UNITS capsule Take 1,000 Units by mouth daily      lisinopril-hydrochlorothiazide (PRINZIDE;ZESTORETIC) 10-12.5 MG per tablet       vitamin D (CHOLECALCIFEROL) 1000 UNIT TABS tablet Take 1,000 Units by mouth daily      Calcium Carbonate-Vit D-Min (CALCIUM 1200 PO) Take by mouth      aspirin 81 MG tablet Take 81 mg by mouth daily       No current facility-administered medications for this visit. Social History     Socioeconomic History    Marital status:       Spouse name: Not on file    Number of children: Not on file    Years of education: Not on file    Highest education level: Not on file   Occupational History    Not on file   Tobacco Use    Smoking status: Former    Smokeless tobacco: Never   Substance and Sexual Activity    Alcohol use:  Yes     Alcohol/week: 0.0 standard drinks    Drug use: No    Sexual activity: Not Currently   Other Topics Concern    Not on file   Social History Narrative    Not on file     Social Determinants of Health     Financial Resource Strain: Not on file   Food Insecurity: Not on file   Transportation Needs: Not on file   Physical Activity: Not on file   Stress: Not on file   Social Connections: Not on file   Intimate Partner Violence: Not on file   Housing Stability: Not on file        Family History   Problem Relation Age of Onset    Cancer Father     Cancer Mother     Breast Cancer Neg Hx     Colon Cancer Neg Hx     Diabetes Neg Hx     Eclampsia Neg Hx     Hypertension Neg Hx     Ovarian Cancer Neg Hx      Labor Neg Hx     Spont Abortions Neg Hx     Stroke Neg Hx     ADHD Neg Hx     Alcohol Abuse Neg Hx     Allergic Rhinitis Neg Hx     Allergies Neg Hx     Allergy (Severe) Neg Hx     Alzheimer's Disease Neg Hx     Amblyopia Neg Hx     Anemia Neg Hx     Anesth Problems Neg Hx     Angioedema Neg Hx     Anxiety Disorder Neg Hx     Arrhythmia Neg Hx     Arthritis Neg Hx     Asthma Neg Hx     Ataxia Neg Hx     Atopy Neg Hx     Bipolar Disorder Neg Hx     Birth Defects Neg Hx     Bleeding Prob Neg Hx     Blindness Neg Hx     Brain Cancer Neg Hx     BRCA 1/2 Neg Hx     Broken Bones Neg Hx     Cataracts Neg Hx     Celiac Disease Neg Hx     Cervical Cancer Neg Hx     Chdhd Hrt Surg Neg Hx     Chdhd Resp Dis Neg Hx     Chorea Neg Hx     Chronic Infections Neg Hx     Cirrhosis Neg Hx     Clotting Disorder Neg Hx     Collagen Disease Neg Hx     Colon Polyps Neg Hx     COPD Neg Hx     Coronary Art Dis Neg Hx     Cowden Syndrome Neg Hx     Crohn's Disease Neg Hx     Cystic Fibrosis Neg Hx     Dementia Neg Hx     Depression Neg Hx     LESLEY Usage Neg Hx     Dislocations Neg Hx     Down Syndrome Neg Hx     Drug Abuse Neg Hx Early Death Neg Hx     Eczema Neg Hx     Elevated Lipids Neg Hx     Emphysema Neg Hx     Endometrial Cancer Neg Hx     Esophageal Cancer Neg Hx     Fainting Neg Hx     Glaucoma Neg Hx      Problems Neg Hx     Hearing Loss Neg Hx     Heart Attack Neg Hx     Heart Defect Neg Hx     Heart Disease Neg Hx     Heart Failure Neg Hx     Heart Surgery Neg Hx     Hemochromatosis Neg Hx     High Blood Pressure Neg Hx     High Cholesterol Neg Hx     Hypotension Neg Hx     Immunodeficiency Neg Hx     Infertility Neg Hx     Inflam Bowel Dis Neg Hx     Irritable Bowel Syndrome Neg Hx     Kidney Cancer Neg Hx     Kidney Disease Neg Hx     Learning Disabilities Neg Hx     Li-Fraumeni Syndrome Neg Hx     Liver Cancer Neg Hx     Liver Disease Neg Hx     Lung Cancer Neg Hx     Lupus Neg Hx     Macular Degen Neg Hx     Malig Hyperten Neg Hx     Malig Hypertherm Neg Hx     Marfan Syndrome Neg Hx     Memory Loss Neg Hx     Mental Illness Neg Hx     Mental Retardation Neg Hx     Migraines Neg Hx     Miscarriages / Stillbirths Neg Hx     Mult Sclerosis Neg Hx     Neurofibromatosis Neg Hx     Neuropathy Neg Hx     Obesity Neg Hx     OCD Neg Hx     Osteoarthritis Neg Hx     Osteoporosis Neg Hx     Other Neg Hx     Pacemaker Neg Hx     Paranoid Behavior Neg Hx     Parkinsonism Neg Hx     Physical Abuse Neg Hx     PKU Neg Hx     Premature Birth Neg Hx     Prostate Cancer Neg Hx     Pseudochol.  Deficiency Neg Hx     Psoriasis Neg Hx     Rashes/Skin Problems Neg Hx     Rectal Cancer Neg Hx     Retinal Detachment Neg Hx     Rheum Arthritis Neg Hx     Rheumatologic Disease Neg Hx     Schizophrenia Neg Hx     Scoliosis Neg Hx     Seizures Neg Hx     Severe Sprains Neg Hx     Sexual Abuse Neg Hx     Sickle Cell Anemia Neg Hx     Sickle Cell Trait Neg Hx     SIDS Neg Hx     Stillborn Neg Hx     Stomach Cancer Neg Hx     Strabismus Neg Hx     Substance Abuse Neg Hx     Sudden Death Neg Hx     Thyroid Cancer Neg Hx     Thyroid Disease Neg Hx Tuberculosis Neg Hx     Ulcerative Colitis Neg Hx     Urolithiasis Neg Hx     Urticaria Neg Hx     Uterine Cancer Neg Hx     Vaginal Cancer Neg Hx     Vision Loss Neg Hx     Lio's Disease Neg Hx        Review of Systems   Constitutional: Negative. Negative for activity change, appetite change, chills, diaphoresis, fatigue, fever and unexpected weight change. HENT: Negative. Eyes: Negative. Respiratory: Negative. Cardiovascular: Negative. Gastrointestinal:  Negative for abdominal distention, abdominal pain, anal bleeding, blood in stool, constipation, diarrhea, nausea, rectal pain and vomiting. Endocrine: Negative. Genitourinary:  Positive for vaginal bleeding (PMB). Negative for decreased urine volume, difficulty urinating, dyspareunia, dysuria, enuresis, flank pain, frequency, genital sores, hematuria, menstrual problem, pelvic pain, urgency, vaginal discharge and vaginal pain. Musculoskeletal: Negative. Skin: Negative. Allergic/Immunologic: Negative. Neurological: Negative. Hematological: Negative. Psychiatric/Behavioral: Negative. Objective:     Physical Exam  Constitutional:       General: She is not in acute distress. Appearance: Normal appearance. She is well-developed. She is not diaphoretic. HENT:      Head: Normocephalic and atraumatic. Eyes:      Conjunctiva/sclera: Conjunctivae normal.   Cardiovascular:      Rate and Rhythm: Normal rate and regular rhythm. Pulmonary:      Effort: Pulmonary effort is normal. No respiratory distress. Abdominal:      General: There is no distension or abdominal bruit. Palpations: Abdomen is soft. Abdomen is not rigid. There is no shifting dullness, fluid wave, hepatomegaly, splenomegaly, mass or pulsatile mass. Tenderness: There is no abdominal tenderness. There is no guarding or rebound. Negative signs include Saucedo's sign and McBurney's sign. Hernia: No hernia is present.  There is no hernia in the umbilical area, ventral area, left inguinal area or right inguinal area. Genitourinary:     Labia:         Right: No rash, tenderness, lesion or injury. Left: No rash, tenderness, lesion or injury. Urethra: No prolapse, urethral pain, urethral swelling or urethral lesion. Vagina: Normal. No signs of injury and foreign body. No vaginal discharge, erythema, tenderness or bleeding. Cervix: No cervical motion tenderness, discharge, friability, lesion, erythema, cervical bleeding or eversion. Uterus: Normal. Not deviated, not enlarged, not fixed, not tender and no uterine prolapse. Adnexa: Right adnexa normal and left adnexa normal.        Right: No mass, tenderness or fullness. Left: No mass, tenderness or fullness. Rectum: Normal.      Comments: No cervical masses or CMT. No pelvic or adnexal masses; NT.    Musculoskeletal:         General: No tenderness or deformity. Normal range of motion. Cervical back: Normal range of motion and neck supple. Skin:     General: Skin is warm and dry. Coloration: Skin is not pale. Neurological:      Mental Status: She is alert and oriented to person, place, and time. Motor: No abnormal muscle tone. Coordination: Coordination normal.   Psychiatric:         Behavior: Behavior normal.         Thought Content: Thought content normal.         Judgment: Judgment normal.       Assessment:          Diagnosis Orders   1. PMB (postmenopausal bleeding)  CBC with Auto Differential    TSH with Reflex    US PELVIS COMPLETE    US NON OB TRANSVAGINAL    US DUP ABD PEL RETRO SCROT COMPLETE    Wet prep, genital    POCT Urinalysis no Micro    PAP SMEAR    Culture, Urine      2. Screening for human papillomavirus  PAP SMEAR      3. Cervical cancer screening  PAP SMEAR      4.  Other abnormal findings in urine   Culture, Urine           Plan:      Medications placedthis encounter:  No orders of the defined types were placed in this encounter. Orders placedthis encounter:  Orders Placed This Encounter   Procedures    Wet prep, genital     Standing Status:   Future     Number of Occurrences:   1     Standing Expiration Date:   1/31/2024    Culture, Urine     Standing Status:   Future     Number of Occurrences:   1     Standing Expiration Date:   1/31/2024     Order Specific Question:   Specify (ex-cath, midstream, cysto, etc)? Answer:   midstream    US PELVIS COMPLETE     Standing Status:   Future     Standing Expiration Date:   1/31/2024    US NON OB TRANSVAGINAL     Standing Status:   Future     Standing Expiration Date:   1/31/2024    US DUP ABD PEL RETRO SCROT COMPLETE     Standing Status:   Future     Standing Expiration Date:   1/31/2024    CBC with Auto Differential     Standing Status:   Future     Standing Expiration Date:   1/31/2024    TSH with Reflex     Standing Status:   Future     Standing Expiration Date:   1/31/2024    PAP SMEAR     Patient History:    No LMP recorded. Patient is postmenopausal.  OBGYN Status: Postmenopausal  Past Surgical History:  02/20/2018: COLONOSCOPY  No date: TUBAL LIGATION      Social History    Tobacco Use      Smoking status: Former      Smokeless tobacco: Never       Standing Status:   Future     Number of Occurrences:   1     Standing Expiration Date:   1/31/2024     Order Specific Question:   Collection Type     Answer: Thin Prep     Order Specific Question:   Prior Abnormal Pap Test     Answer:   No     Order Specific Question:   Screening or Diagnostic     Answer:   Screening     Order Specific Question:   HPV Requested? Answer:   Yes     Order Specific Question:   High Risk Patient     Answer:   N/A    POCT Urinalysis no Micro         Follow up:  Return for Endosee w/ bx, Results Review, Pelvic US.

## 2023-01-31 NOTE — PATIENT INSTRUCTIONS
EMBX---      You have been scheduled for an Endometrial Biopsy (EMBX). An EMBX is a procedure that involves using a soft, straw-like device to suction a small sample of lining from the uterus. An EMBX is done to find the cause of heavy, prolonged, or irregular uterine bleeding. It is also done to find the cause of uterine bleeding in women who have gone through menopause. Do I need anything prior to my EMBX? 1. No unprotected intercourse for 3-4 weeks prior to procedure  2. Take 800mg of Motrin 1 hour prior to your procedure  3. If you start your period you can still have the procedure if you are having light bleeding such as the first or last day of your cycle. What can I expect when I go home? 1. You may have pink or red tinged discharge after the procedure for up to 24 hours. 2.  This procedure may cause your period to start. 3.  If you have heavy bleeding (filling an overnight pad in 1 hour or less for 3 hrs), a fever of 100 degrees (or higher), or abdominal pain that is debilitating, please call the office immediately at [phone]. ENDOSEE - OFFICE HYSTEROSCOPY    Endosee Office Hysteroscopy is a diagnostic tool that lets your doctor see the inside of your uterus quickly and easily right in her office, without the need for general anesthesia in an operating room. James Garces helps find the cause of several common issues women face such as abnormal bleeding and infertility concerns. Before using Endosee, your doctor will need to look at your health history, but most women are able to have the procedure without a problem. Do I need to do anything prior to my Endosee? 1. Ultrasound of pelvis complete and Ultrasound non-ob transvaginal to be done before the Endosee. Ultrasounds will be scheduled at check-out along with Endosee and follow-up office visit for results 2 weeks after Endosee. 2.  No unprotected intercourse for 3 weeks prior to procedure  3.   Take 800mg of Motrin 1 hour prior to your procedure  4. If you start your period, you can still have the Endosee done if your bleeding is very light. If your cycle is heavy please call to reschedule. 5.  Avoid vaginal medications or creams & douching for 24 hours before the procedure. 6.  Please come prepared to leave a urine sample prior to your procedure. What can I expect when I go home? 1. You may have some spotting or light discharge for up to 24 hours. 2.  You can resume all normal activities. 3.  The procedure may start your period. *If a biopsy is done, you will need to schedule a follow up appointment for 2 weeks following your procedure to get your results from the physician.

## 2023-02-01 ENCOUNTER — HOSPITAL ENCOUNTER (OUTPATIENT)
Dept: ULTRASOUND IMAGING | Age: 73
Discharge: HOME | End: 2023-02-03
Payer: MEDICARE

## 2023-02-01 DIAGNOSIS — N95.0 PMB (POSTMENOPAUSAL BLEEDING): ICD-10-CM

## 2023-02-01 PROCEDURE — 76856 US EXAM PELVIC COMPLETE: CPT

## 2023-02-01 PROCEDURE — 76830 TRANSVAGINAL US NON-OB: CPT

## 2023-02-02 LAB — URINE CULTURE, ROUTINE: NORMAL

## 2023-02-04 LAB
HPV COMMENT: NORMAL
HPV TYPE 16: NOT DETECTED
HPV TYPE 18: NOT DETECTED
HPVOH (OTHER TYPES): NOT DETECTED

## 2023-05-08 ENCOUNTER — OFFICE VISIT (OUTPATIENT)
Dept: OBGYN CLINIC | Age: 73
End: 2023-05-08
Payer: MEDICARE

## 2023-05-08 VITALS
DIASTOLIC BLOOD PRESSURE: 70 MMHG | WEIGHT: 220 LBS | HEIGHT: 68 IN | SYSTOLIC BLOOD PRESSURE: 114 MMHG | BODY MASS INDEX: 33.34 KG/M2

## 2023-05-08 DIAGNOSIS — Z01.419 ENCOUNTER FOR WELL WOMAN EXAM WITH ROUTINE GYNECOLOGICAL EXAM: Primary | ICD-10-CM

## 2023-05-08 DIAGNOSIS — Z12.31 SCREENING MAMMOGRAM FOR BREAST CANCER: ICD-10-CM

## 2023-05-08 PROCEDURE — G0101 CA SCREEN;PELVIC/BREAST EXAM: HCPCS | Performed by: OBSTETRICS & GYNECOLOGY

## 2023-05-08 SDOH — ECONOMIC STABILITY: FOOD INSECURITY: WITHIN THE PAST 12 MONTHS, YOU WORRIED THAT YOUR FOOD WOULD RUN OUT BEFORE YOU GOT MONEY TO BUY MORE.: NEVER TRUE

## 2023-05-08 SDOH — ECONOMIC STABILITY: INCOME INSECURITY: HOW HARD IS IT FOR YOU TO PAY FOR THE VERY BASICS LIKE FOOD, HOUSING, MEDICAL CARE, AND HEATING?: NOT HARD AT ALL

## 2023-05-08 SDOH — ECONOMIC STABILITY: HOUSING INSECURITY
IN THE LAST 12 MONTHS, WAS THERE A TIME WHEN YOU DID NOT HAVE A STEADY PLACE TO SLEEP OR SLEPT IN A SHELTER (INCLUDING NOW)?: NO

## 2023-05-08 SDOH — ECONOMIC STABILITY: FOOD INSECURITY: WITHIN THE PAST 12 MONTHS, THE FOOD YOU BOUGHT JUST DIDN'T LAST AND YOU DIDN'T HAVE MONEY TO GET MORE.: NEVER TRUE

## 2023-05-08 ASSESSMENT — ENCOUNTER SYMPTOMS
VOMITING: 0
DIARRHEA: 0
CONSTIPATION: 0
ALLERGIC/IMMUNOLOGIC NEGATIVE: 1
ANAL BLEEDING: 0
NAUSEA: 0
ABDOMINAL DISTENTION: 0
BLOOD IN STOOL: 0
RESPIRATORY NEGATIVE: 1
ABDOMINAL PAIN: 0
RECTAL PAIN: 0
EYES NEGATIVE: 1

## 2023-05-08 ASSESSMENT — VISUAL ACUITY: OU: 1

## 2023-05-08 NOTE — PROGRESS NOTES
The patient was asked if she would like a chaperone present for her intimate exam. She  Declined the chaperone.  Denver Spence CMA ([address] [place] [address])

## 2023-05-08 NOTE — PROGRESS NOTES
Subjective:      Patient ID: Ana M Michele is a 74 y.o. female    Annual exam. Reviewed medical, surgical, social and family history. Also reviewed current medications and allergies. No PMB. No GYN complaints. Pap deferred. Screening mammogram ordered. Monthly SBE encouraged. Dexa scan ordered per protocol. Screening colonoscopy recommended per routine. F/U annual exam or prn. Vitals:  /70   Ht 5' 8\" (1.727 m)   Wt 220 lb (99.8 kg)   BMI 33.45 kg/m²   Past Medical History:   Diagnosis Date    Hypertension      Past Surgical History:   Procedure Laterality Date    COLONOSCOPY  02/20/2018    TUBAL LIGATION       Allergies:  Betadine [povidone iodine], Sulfa antibiotics, and Nickel  Current Outpatient Medications   Medication Sig Dispense Refill    nystatin-triamcinolone (MYCOLOG II) 996886-9.1 UNIT/GM-% cream APPLY TOPICALLY TO THE AFFECTED AREA TWICE DAILY 30 g 3    magnesium oxide (MAG-OX) 400 MG tablet Take by mouth      nystatin 600308 UNIT/GM powder APPLY THREE TIMES DAILY 30 g 0    Pyridoxine HCl (VITAMIN B-6 PO) Take by mouth      vitamin E 1000 UNITS capsule Take 1 capsule by mouth daily      lisinopril-hydrochlorothiazide (PRINZIDE;ZESTORETIC) 10-12.5 MG per tablet       vitamin D (CHOLECALCIFEROL) 1000 UNIT TABS tablet Take 1 tablet by mouth daily      Calcium Carbonate-Vit D-Min (CALCIUM 1200 PO) Take by mouth      aspirin 81 MG tablet Take 1 tablet by mouth daily       No current facility-administered medications for this visit. Social History     Socioeconomic History    Marital status:      Spouse name: Not on file    Number of children: Not on file    Years of education: Not on file    Highest education level: Not on file   Occupational History    Not on file   Tobacco Use    Smoking status: Former    Smokeless tobacco: Never   Substance and Sexual Activity    Alcohol use:  Yes     Alcohol/week: 0.0 standard drinks    Drug use: No    Sexual activity: Not Currently

## 2023-05-17 ENCOUNTER — OFFICE VISIT (OUTPATIENT)
Dept: PRIMARY CARE | Facility: CLINIC | Age: 73
End: 2023-05-17
Payer: MEDICARE

## 2023-05-17 VITALS
HEART RATE: 56 BPM | OXYGEN SATURATION: 98 % | DIASTOLIC BLOOD PRESSURE: 84 MMHG | HEIGHT: 67 IN | SYSTOLIC BLOOD PRESSURE: 130 MMHG | BODY MASS INDEX: 34.37 KG/M2 | TEMPERATURE: 97.8 F | WEIGHT: 219 LBS

## 2023-05-17 DIAGNOSIS — I49.3 PVC (PREMATURE VENTRICULAR CONTRACTION): Primary | ICD-10-CM

## 2023-05-17 DIAGNOSIS — I25.10 ASHD (ARTERIOSCLEROTIC HEART DISEASE): ICD-10-CM

## 2023-05-17 DIAGNOSIS — I10 PRIMARY HYPERTENSION: ICD-10-CM

## 2023-05-17 PROBLEM — E66.09 CLASS 1 OBESITY DUE TO EXCESS CALORIES WITH BODY MASS INDEX (BMI) OF 34.0 TO 34.9 IN ADULT: Status: ACTIVE | Noted: 2023-05-17

## 2023-05-17 PROBLEM — E66.811 CLASS 1 OBESITY DUE TO EXCESS CALORIES WITH BODY MASS INDEX (BMI) OF 34.0 TO 34.9 IN ADULT: Status: ACTIVE | Noted: 2023-05-17

## 2023-05-17 PROBLEM — M54.12 CERVICAL RADICULOPATHY, CHRONIC: Status: ACTIVE | Noted: 2023-05-17

## 2023-05-17 PROBLEM — M17.10 ARTHRITIS OF KNEE: Status: ACTIVE | Noted: 2023-05-17

## 2023-05-17 PROCEDURE — 1160F RVW MEDS BY RX/DR IN RCRD: CPT | Performed by: INTERNAL MEDICINE

## 2023-05-17 PROCEDURE — 99213 OFFICE O/P EST LOW 20 MIN: CPT | Performed by: INTERNAL MEDICINE

## 2023-05-17 PROCEDURE — 3079F DIAST BP 80-89 MM HG: CPT | Performed by: INTERNAL MEDICINE

## 2023-05-17 PROCEDURE — 1159F MED LIST DOCD IN RCRD: CPT | Performed by: INTERNAL MEDICINE

## 2023-05-17 PROCEDURE — 1036F TOBACCO NON-USER: CPT | Performed by: INTERNAL MEDICINE

## 2023-05-17 PROCEDURE — 3075F SYST BP GE 130 - 139MM HG: CPT | Performed by: INTERNAL MEDICINE

## 2023-05-17 RX ORDER — ASPIRIN 81 MG/1
1 TABLET ORAL DAILY
COMMUNITY
Start: 2019-04-15

## 2023-05-17 RX ORDER — NYSTATIN AND TRIAMCINOLONE ACETONIDE 100000; 1 [USP'U]/G; MG/G
1 CREAM TOPICAL AS NEEDED
COMMUNITY
Start: 2022-05-23

## 2023-05-17 RX ORDER — IBUPROFEN 200 MG
500 CAPSULE ORAL DAILY
COMMUNITY
Start: 2019-04-15

## 2023-05-17 RX ORDER — ASCORBATE CALCIUM 500 MG
1 TABLET ORAL DAILY
COMMUNITY
Start: 2019-04-15

## 2023-05-17 RX ORDER — CHOLECALCIFEROL (VITAMIN D3) 25 MCG
1 TABLET ORAL DAILY
COMMUNITY
Start: 2019-04-15

## 2023-05-17 RX ORDER — LANOLIN ALCOHOL/MO/W.PET/CERES
1 CREAM (GRAM) TOPICAL 3 TIMES DAILY
COMMUNITY
Start: 2022-06-14

## 2023-05-17 RX ORDER — BETAMETHASONE VALERATE 1 MG/G
1 CREAM TOPICAL AS NEEDED
COMMUNITY
Start: 2019-04-15

## 2023-05-17 RX ORDER — LISINOPRIL AND HYDROCHLOROTHIAZIDE 12.5; 2 MG/1; MG/1
1 TABLET ORAL 2 TIMES DAILY
COMMUNITY
End: 2023-10-09 | Stop reason: SDUPTHER

## 2023-05-17 ASSESSMENT — ENCOUNTER SYMPTOMS
GASTROINTESTINAL NEGATIVE: 1
MUSCULOSKELETAL NEGATIVE: 1
CONSTITUTIONAL NEGATIVE: 1
RESPIRATORY NEGATIVE: 1
NEUROLOGICAL NEGATIVE: 1
PSYCHIATRIC NEGATIVE: 1

## 2023-05-17 NOTE — PROGRESS NOTES
"Subjective   Patient ID: Karly Milton is a 72 y.o. female who presents for Follow-up (4 mo fu).    Patient is here for office visit she has history of mild CAD with cardiac cath in 20007, she was referred to electrophysiology she had heart monitor twice there were PVCs but no A-fib she was put on magnesium and given a prescription of magnesium sulfate by electrophysiology Dr. Ray.  She is not using beta-blocker she says she is not having any palpitations at this point she has tried lifestyle modification and has lost another 11 pounds and she feels really good about diet.         Review of Systems   Constitutional: Negative.    HENT: Negative.     Respiratory: Negative.     Gastrointestinal: Negative.    Genitourinary: Negative.    Musculoskeletal: Negative.    Neurological: Negative.    Psychiatric/Behavioral: Negative.         Objective   /84 (BP Location: Right arm, Patient Position: Sitting, BP Cuff Size: Large adult)   Pulse 56   Temp 36.6 °C (97.8 °F) (Temporal)   Ht 1.708 m (5' 7.25\")   Wt 99.3 kg (219 lb)   SpO2 98%   BMI 34.05 kg/m²     Physical Exam  Constitutional:       Appearance: Normal appearance.   HENT:      Head: Atraumatic.   Eyes:      Extraocular Movements: Extraocular movements intact.      Pupils: Pupils are equal, round, and reactive to light.   Cardiovascular:      Rate and Rhythm: Normal rate and regular rhythm.      Pulses: Normal pulses.      Heart sounds: Normal heart sounds.   Musculoskeletal:         General: No swelling or tenderness.   Neurological:      General: No focal deficit present.      Mental Status: She is alert and oriented to person, place, and time.   Psychiatric:         Mood and Affect: Mood normal.         Behavior: Behavior normal.         Assessment/Plan   Problem List Items Addressed This Visit          Circulatory    PVC (premature ventricular contraction) - Primary    Primary hypertension    ASHD (arteriosclerotic heart disease)     Patient will " continue lisinopril hydrochlorothiazide for hypertension continue with lifestyle modification for obesity and she has done really well.  She has a history of mild CAD continue with low-dose aspirin.  Advised yearly mammograms for breast cancer screening.  Take magnesium oxide for palpitations/PVCs.

## 2023-05-24 ENCOUNTER — HOSPITAL ENCOUNTER (OUTPATIENT)
Dept: WOMENS IMAGING | Age: 73
Discharge: HOME | End: 2023-05-26
Payer: MEDICARE

## 2023-05-24 VITALS — BODY MASS INDEX: 33.34 KG/M2 | WEIGHT: 220 LBS | HEIGHT: 68 IN

## 2023-05-24 DIAGNOSIS — Z12.31 SCREENING MAMMOGRAM FOR BREAST CANCER: ICD-10-CM

## 2023-05-24 PROCEDURE — 77063 BREAST TOMOSYNTHESIS BI: CPT

## 2023-09-21 ENCOUNTER — APPOINTMENT (OUTPATIENT)
Dept: PRIMARY CARE | Facility: CLINIC | Age: 73
End: 2023-09-21
Payer: MEDICARE

## 2023-09-25 ENCOUNTER — OFFICE VISIT (OUTPATIENT)
Dept: PRIMARY CARE | Facility: CLINIC | Age: 73
End: 2023-09-25
Payer: MEDICARE

## 2023-09-25 VITALS
OXYGEN SATURATION: 99 % | BODY MASS INDEX: 31.52 KG/M2 | DIASTOLIC BLOOD PRESSURE: 70 MMHG | WEIGHT: 208 LBS | TEMPERATURE: 97.2 F | SYSTOLIC BLOOD PRESSURE: 130 MMHG | HEIGHT: 68 IN

## 2023-09-25 DIAGNOSIS — R73.03 PREDIABETES: ICD-10-CM

## 2023-09-25 DIAGNOSIS — M54.12 CERVICAL RADICULOPATHY, CHRONIC: ICD-10-CM

## 2023-09-25 DIAGNOSIS — I10 PRIMARY HYPERTENSION: ICD-10-CM

## 2023-09-25 DIAGNOSIS — R53.83 FATIGUE, UNSPECIFIED TYPE: ICD-10-CM

## 2023-09-25 DIAGNOSIS — I49.3 PVC (PREMATURE VENTRICULAR CONTRACTION): ICD-10-CM

## 2023-09-25 DIAGNOSIS — E78.2 MIXED HYPERLIPIDEMIA: ICD-10-CM

## 2023-09-25 DIAGNOSIS — I25.10 ASHD (ARTERIOSCLEROTIC HEART DISEASE): Primary | ICD-10-CM

## 2023-09-25 PROCEDURE — 3075F SYST BP GE 130 - 139MM HG: CPT | Performed by: INTERNAL MEDICINE

## 2023-09-25 PROCEDURE — 1159F MED LIST DOCD IN RCRD: CPT | Performed by: INTERNAL MEDICINE

## 2023-09-25 PROCEDURE — 1160F RVW MEDS BY RX/DR IN RCRD: CPT | Performed by: INTERNAL MEDICINE

## 2023-09-25 PROCEDURE — 3078F DIAST BP <80 MM HG: CPT | Performed by: INTERNAL MEDICINE

## 2023-09-25 PROCEDURE — 99214 OFFICE O/P EST MOD 30 MIN: CPT | Performed by: INTERNAL MEDICINE

## 2023-09-25 PROCEDURE — 1036F TOBACCO NON-USER: CPT | Performed by: INTERNAL MEDICINE

## 2023-09-25 ASSESSMENT — ENCOUNTER SYMPTOMS
LOSS OF SENSATION IN FEET: 0
DEPRESSION: 0
RESPIRATORY NEGATIVE: 1
OCCASIONAL FEELINGS OF UNSTEADINESS: 0
NEUROLOGICAL NEGATIVE: 1
GASTROINTESTINAL NEGATIVE: 1
HEMATOLOGIC/LYMPHATIC NEGATIVE: 1
PSYCHIATRIC NEGATIVE: 1
CONSTITUTIONAL NEGATIVE: 1
CARDIOVASCULAR NEGATIVE: 1

## 2023-10-03 ENCOUNTER — LAB (OUTPATIENT)
Dept: LAB | Facility: LAB | Age: 73
End: 2023-10-03
Payer: MEDICARE

## 2023-10-03 DIAGNOSIS — R73.03 PREDIABETES: ICD-10-CM

## 2023-10-03 DIAGNOSIS — I25.10 ASHD (ARTERIOSCLEROTIC HEART DISEASE): ICD-10-CM

## 2023-10-03 DIAGNOSIS — E78.2 MIXED HYPERLIPIDEMIA: ICD-10-CM

## 2023-10-03 DIAGNOSIS — R53.83 FATIGUE, UNSPECIFIED TYPE: ICD-10-CM

## 2023-10-03 LAB
ALBUMIN SERPL BCP-MCNC: 4.2 G/DL (ref 3.4–5)
ALP SERPL-CCNC: 45 U/L (ref 33–136)
ALT SERPL W P-5'-P-CCNC: 26 U/L (ref 7–45)
ANION GAP SERPL CALC-SCNC: 13 MMOL/L (ref 10–20)
AST SERPL W P-5'-P-CCNC: 28 U/L (ref 9–39)
BASOPHILS # BLD AUTO: 0.04 X10*3/UL (ref 0–0.1)
BASOPHILS NFR BLD AUTO: 0.6 %
BILIRUB SERPL-MCNC: 0.7 MG/DL (ref 0–1.2)
BUN SERPL-MCNC: 16 MG/DL (ref 6–23)
CALCIUM SERPL-MCNC: 9.4 MG/DL (ref 8.6–10.3)
CHLORIDE SERPL-SCNC: 102 MMOL/L (ref 98–107)
CHOLEST SERPL-MCNC: 166 MG/DL (ref 0–199)
CHOLESTEROL/HDL RATIO: 3
CO2 SERPL-SCNC: 26 MMOL/L (ref 21–32)
CREAT SERPL-MCNC: 0.85 MG/DL (ref 0.5–1.05)
EOSINOPHIL # BLD AUTO: 0.27 X10*3/UL (ref 0–0.4)
EOSINOPHIL NFR BLD AUTO: 3.8 %
ERYTHROCYTE [DISTWIDTH] IN BLOOD BY AUTOMATED COUNT: 12.9 % (ref 11.5–14.5)
EST. AVERAGE GLUCOSE BLD GHB EST-MCNC: 91 MG/DL
GFR SERPL CREATININE-BSD FRML MDRD: 72 ML/MIN/1.73M*2
GLUCOSE SERPL-MCNC: 91 MG/DL (ref 74–99)
HBA1C MFR BLD: 4.8 %
HCT VFR BLD AUTO: 41.6 % (ref 36–46)
HDLC SERPL-MCNC: 54.8 MG/DL
HGB BLD-MCNC: 14.5 G/DL (ref 12–16)
IMM GRANULOCYTES # BLD AUTO: 0.03 X10*3/UL (ref 0–0.5)
IMM GRANULOCYTES NFR BLD AUTO: 0.4 % (ref 0–0.9)
LDLC SERPL CALC-MCNC: 96 MG/DL (ref 140–190)
LDLC SERPL DIRECT ASSAY-MCNC: 103 MG/DL (ref 0–129)
LYMPHOCYTES # BLD AUTO: 2.62 X10*3/UL (ref 0.8–3)
LYMPHOCYTES NFR BLD AUTO: 36.5 %
MCH RBC QN AUTO: 29.8 PG (ref 26–34)
MCHC RBC AUTO-ENTMCNC: 34.9 G/DL (ref 32–36)
MCV RBC AUTO: 86 FL (ref 80–100)
MONOCYTES # BLD AUTO: 0.56 X10*3/UL (ref 0.05–0.8)
MONOCYTES NFR BLD AUTO: 7.8 %
NEUTROPHILS # BLD AUTO: 3.65 X10*3/UL (ref 1.6–5.5)
NEUTROPHILS NFR BLD AUTO: 50.9 %
NON HDL CHOLESTEROL: 111 MG/DL (ref 0–149)
NRBC BLD-RTO: 0 /100 WBCS (ref 0–0)
PLATELET # BLD AUTO: 206 X10*3/UL (ref 150–450)
PMV BLD AUTO: 11.3 FL (ref 7.5–11.5)
POTASSIUM SERPL-SCNC: 4 MMOL/L (ref 3.5–5.3)
PROT SERPL-MCNC: 6.6 G/DL (ref 6.4–8.2)
RBC # BLD AUTO: 4.86 X10*6/UL (ref 4–5.2)
SODIUM SERPL-SCNC: 137 MMOL/L (ref 136–145)
TRIGL SERPL-MCNC: 78 MG/DL (ref 0–149)
VLDL: 16 MG/DL (ref 0–40)
WBC # BLD AUTO: 7.2 X10*3/UL (ref 4.4–11.3)

## 2023-10-03 PROCEDURE — 36415 COLL VENOUS BLD VENIPUNCTURE: CPT

## 2023-10-09 DIAGNOSIS — I10 PRIMARY HYPERTENSION: Primary | ICD-10-CM

## 2023-10-09 RX ORDER — LISINOPRIL AND HYDROCHLOROTHIAZIDE 12.5; 2 MG/1; MG/1
1 TABLET ORAL 2 TIMES DAILY
Qty: 90 TABLET | Refills: 2 | Status: SHIPPED | OUTPATIENT
Start: 2023-10-09 | End: 2024-02-26 | Stop reason: SDUPTHER

## 2023-10-27 ENCOUNTER — OFFICE VISIT (OUTPATIENT)
Dept: PRIMARY CARE | Facility: CLINIC | Age: 73
End: 2023-10-27
Payer: MEDICARE

## 2023-10-27 VITALS
BODY MASS INDEX: 31.78 KG/M2 | WEIGHT: 209 LBS | DIASTOLIC BLOOD PRESSURE: 72 MMHG | TEMPERATURE: 97.7 F | HEART RATE: 64 BPM | SYSTOLIC BLOOD PRESSURE: 124 MMHG

## 2023-10-27 DIAGNOSIS — J02.9 ACUTE PHARYNGITIS, UNSPECIFIED ETIOLOGY: Primary | ICD-10-CM

## 2023-10-27 PROCEDURE — 99213 OFFICE O/P EST LOW 20 MIN: CPT | Performed by: INTERNAL MEDICINE

## 2023-10-27 PROCEDURE — 3078F DIAST BP <80 MM HG: CPT | Performed by: INTERNAL MEDICINE

## 2023-10-27 PROCEDURE — 1160F RVW MEDS BY RX/DR IN RCRD: CPT | Performed by: INTERNAL MEDICINE

## 2023-10-27 PROCEDURE — 1159F MED LIST DOCD IN RCRD: CPT | Performed by: INTERNAL MEDICINE

## 2023-10-27 PROCEDURE — 1036F TOBACCO NON-USER: CPT | Performed by: INTERNAL MEDICINE

## 2023-10-27 PROCEDURE — 3074F SYST BP LT 130 MM HG: CPT | Performed by: INTERNAL MEDICINE

## 2023-10-27 RX ORDER — AMOXICILLIN 875 MG/1
875 TABLET, FILM COATED ORAL 2 TIMES DAILY
Qty: 14 TABLET | Refills: 0 | Status: SHIPPED | OUTPATIENT
Start: 2023-10-27 | End: 2023-11-03

## 2023-10-27 ASSESSMENT — ENCOUNTER SYMPTOMS
CARDIOVASCULAR NEGATIVE: 1
NEUROLOGICAL NEGATIVE: 1
RESPIRATORY NEGATIVE: 1
EYES NEGATIVE: 1
CONSTITUTIONAL NEGATIVE: 1
GASTROINTESTINAL NEGATIVE: 1
PSYCHIATRIC NEGATIVE: 1
SINUS PRESSURE: 1

## 2023-10-27 NOTE — PROGRESS NOTES
Subjective   Patient ID: Karly Milton is a 73 y.o. female who presents for acute (Pt here for sore throat x2 weeks.  More pronounce with talking and singing in lower notes).    HPI patient is here for sore throat for the last 2 to 3 weeks.  She has no fever no cough no shortness of breath.    Review of Systems   Constitutional: Negative.    HENT:  Positive for sinus pressure.    Eyes: Negative.    Respiratory: Negative.     Cardiovascular: Negative.    Gastrointestinal: Negative.    Genitourinary: Negative.    Neurological: Negative.    Psychiatric/Behavioral: Negative.     All other systems reviewed and are negative.      Objective   /72   Pulse 64   Temp 36.5 °C (97.7 °F) (Temporal)   Wt 94.8 kg (209 lb)   BMI 31.78 kg/m²     Physical Exam  Vitals reviewed.   Cardiovascular:      Rate and Rhythm: Normal rate and regular rhythm.   Pulmonary:      Effort: Pulmonary effort is normal.      Breath sounds: Normal breath sounds.   Neurological:      General: No focal deficit present.      Mental Status: Mental status is at baseline.       Assessment/Plan   Problem List Items Addressed This Visit    None  Visit Diagnoses         Codes    Acute pharyngitis, unspecified etiology    -  Primary J02.9    Relevant Medications    amoxicillin (Amoxil) 875 mg tablet        Patient will be given amoxicillin for a week if that does not help her sore throat she was advised to see ENT.

## 2023-11-02 DIAGNOSIS — R68.89 ENT COMPLAINT: ICD-10-CM

## 2023-11-13 DIAGNOSIS — M17.10 ARTHRITIS OF KNEE: ICD-10-CM

## 2023-11-18 ASSESSMENT — ENCOUNTER SYMPTOMS
HOARSE VOICE: 1
SORE THROAT: 1

## 2023-11-21 ENCOUNTER — OFFICE VISIT (OUTPATIENT)
Dept: OTOLARYNGOLOGY | Facility: CLINIC | Age: 73
End: 2023-11-21
Payer: MEDICARE

## 2023-11-21 VITALS — BODY MASS INDEX: 31.37 KG/M2 | TEMPERATURE: 97.2 F | HEIGHT: 68 IN | WEIGHT: 207.01 LBS

## 2023-11-21 DIAGNOSIS — R49.0 DYSPHONIA: Primary | ICD-10-CM

## 2023-11-21 DIAGNOSIS — R68.89 ENT COMPLAINT: ICD-10-CM

## 2023-11-21 DIAGNOSIS — J38.7 PRESBYLARYNGES: ICD-10-CM

## 2023-11-21 PROCEDURE — 1036F TOBACCO NON-USER: CPT | Performed by: OTOLARYNGOLOGY

## 2023-11-21 PROCEDURE — 1160F RVW MEDS BY RX/DR IN RCRD: CPT | Performed by: OTOLARYNGOLOGY

## 2023-11-21 PROCEDURE — 99214 OFFICE O/P EST MOD 30 MIN: CPT | Performed by: OTOLARYNGOLOGY

## 2023-11-21 PROCEDURE — 1159F MED LIST DOCD IN RCRD: CPT | Performed by: OTOLARYNGOLOGY

## 2023-11-21 PROCEDURE — 31575 DIAGNOSTIC LARYNGOSCOPY: CPT | Performed by: OTOLARYNGOLOGY

## 2023-11-21 RX ORDER — LANOLIN ALCOHOL/MO/W.PET/CERES
1 CREAM (GRAM) TOPICAL DAILY
COMMUNITY

## 2023-11-21 RX ORDER — TRIAMCINOLONE ACETONIDE 1 MG/G
1 CREAM TOPICAL EVERY 12 HOURS PRN
COMMUNITY
Start: 2014-10-29

## 2023-11-21 ASSESSMENT — PATIENT HEALTH QUESTIONNAIRE - PHQ9
2. FEELING DOWN, DEPRESSED OR HOPELESS: NOT AT ALL
1. LITTLE INTEREST OR PLEASURE IN DOING THINGS: NOT AT ALL
SUM OF ALL RESPONSES TO PHQ9 QUESTIONS 1 & 2: 0

## 2023-11-21 NOTE — PROGRESS NOTES
"    ASSESSMENT AND PLAN:   Karly Milton is a 73 y.o. female presenting for an initial visit with findings of globus sensation with mild discomfort when singing.       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 She has mild irritation of the posterior arytenoid. These are likley contributing to her symptoms.     We discussed options to include speech therapy vs vocal cord injection vs thyroplasty. She elected to proceed with a vocal cord injection first.          Reason For Consult  Chief Complaint   Patient presents with    Sore Throat     For 4 weeks          HISTORY OF PRESENT ILLNESS:  Karly Milton is a 73 y.o. female presenting for an initial visit with me for sore throat.  The patient reports an URI 3-4 weeks ago and has noticed that she has a \"twisting\" sensation when singing. This is more noticeable when singing for a long time. She has noted a raspy hoarseness. She has low pitch when talking which is uncomfortable for her. She reports that she performs voice rest. She has associated throat clearing.    No reflux. There have been no new changes to her swallow.       She complains of itchy ears. She has a history of eczema.      Past Medical History  She has a past medical history of Acute bronchitis due to other specified organisms, Acute upper respiratory infection, unspecified, Other abnormal glucose, Personal history of diseases of the skin and subcutaneous tissue, Personal history of diseases of the skin and subcutaneous tissue, Personal history of other diseases of the circulatory system, Personal history of other diseases of the digestive system, Personal history of other diseases of the nervous system and sense organs, Personal history of other diseases of the respiratory system, Personal history of other endocrine, nutritional and metabolic disease, Pruritus, unspecified, Radiculopathy, cervical region, and Right upper quadrant " pain. Surgical History  She has a past surgical history that includes Other surgical history (06/13/2022); Other surgical history (06/13/2022); Other surgical history (06/13/2022); and Other surgical history (06/13/2022).   Social History  She reports that she has quit smoking. Her smoking use included cigarettes. She has never used smokeless tobacco. She reports that she does not currently use alcohol. She reports that she does not use drugs. Allergies  Adhesive tape-silicones, Amoxicillin, Povidone-iodine, Statins-hmg-coa reductase inhibitors, Sulfa (sulfonamide antibiotics), and Nickel     Family History  No family history on file.     Review of Systems  All 10 systems were reviewed and negative except for above.      Physical Exam    ENT Physical Exam  Constitutional  Appearance: patient appears well-developed and well-nourished,  Head and Face  Appearance: head appears normal and face appears normal;  Ear  Auricles: right auricle normal; left auricle normal;  Nose  External Nose: nares patent bilaterally;  Oral Cavity/Oropharynx  Lips: normal;  Neck  Neck: neck normal; neck palpation normal;  Respiratory  Inspection: no retractions visible;  Cardiovascular  Inspection: no peripheral edema present;  Neurovestibular  Mental Status: alert and oriented;  Psychiatric: mood normal;  Cranial Nerves: cranial nerves intact;        Last Recorded Vitals  There were no vitals taken for this visit.    ----------------------------------------------------------------------  Procedures     Flexible Laryngoscopy w/ Videostroboscopy    VOICE AND SPEECH CHARACTERISTICS:  Normal spoken speech, (+) mild dysphonia, no roughness, no breathiness, (+) mild asthenia, (+) mild strain.      Intelligibility: normal.   Resonance: balanced.   Vocal Loudness: normal.   Breath Support: normal.    PROCEDURE:    Indications: voice change  Procedure Note  Anesthesia: Lidocaine 4% and Abhinav-Synephrine 1/2%    Endoscopy Type:  Flexible  Laryngoscopy    Procedure Details:    The patient was placed in the sitting position.  After topical anesthesia and decongestion, the 4 mm laryngoscope was passed.  The nasal cavities, nasopharynx, oropharynx, hypopharynx, and larynx were all examined.  Vocal cords were examined during respiration and phonation.  The following findings were noted:    Condition:  Stable.  Patient tolerated procedure well.    Complications:  None  Patient is seated in the exam chair. After adequate topical anesthesia, I advance the flexible endoscope. The examination included evaluation of the mcdonald, vallecula, base of tongue, pyriforms, post-cricoid area, larynx and immediate subglottis.  Findings : assessment of the nasopharynx, base of tongue/vallecula, pyriform sinuses, post-cricoid area and pharyngeal walls was without lesion or mass, pharyngeal wall contraction is normal and symmetric, and no pooling of secretions  erythema/hyperemia: 2 (arytenoids)  Gross Arytenoid Movement: symmetric.  Arytenoid Height: normal.   Supraglottic Tension: lateral.  Closure: bowing.    Time Spent  Prep time on day of patient encounter: 10 minutes  Time spent directly with patient, family or caregiver: 25 minutes  Additional Time Spent on Patient Care Activities: 5 minutes  Documentation Time: 10 minutes  Other Time Spent: 0 minutes  Total: 50 minutes     Scribe Attestation  By signing my name below, Ebonie BURLESON , Scribabelardo attest that this documentation has been prepared under the direction and in the presence of Ayan Sullivan MD.

## 2023-12-19 ENCOUNTER — PROCEDURE VISIT (OUTPATIENT)
Dept: OTOLARYNGOLOGY | Facility: CLINIC | Age: 73
End: 2023-12-19
Payer: MEDICARE

## 2023-12-19 VITALS
DIASTOLIC BLOOD PRESSURE: 81 MMHG | WEIGHT: 209.6 LBS | SYSTOLIC BLOOD PRESSURE: 151 MMHG | BODY MASS INDEX: 31.77 KG/M2 | HEIGHT: 68 IN | TEMPERATURE: 97.3 F

## 2023-12-19 DIAGNOSIS — R49.8 VOICE FATIGUE: ICD-10-CM

## 2023-12-19 DIAGNOSIS — J38.01 PARESIS OF RIGHT VOCAL FOLD: ICD-10-CM

## 2023-12-19 DIAGNOSIS — J38.7 PRESBYLARYNGES: Primary | ICD-10-CM

## 2023-12-19 DIAGNOSIS — J38.01 PARESIS OF LEFT VOCAL CORD: ICD-10-CM

## 2023-12-19 DIAGNOSIS — R49.0 MUSCLE TENSION DYSPHONIA: ICD-10-CM

## 2023-12-19 DIAGNOSIS — R49.0 DYSPHONIA: ICD-10-CM

## 2023-12-19 DIAGNOSIS — Z78.9 DOES NOT USE RESPIRATORY SUPPORT FOR VOICE: ICD-10-CM

## 2023-12-19 PROCEDURE — 99213 OFFICE O/P EST LOW 20 MIN: CPT | Performed by: OTOLARYNGOLOGY

## 2023-12-19 PROCEDURE — 31574 LARGSC W/NJX AUGMENTATION: CPT | Performed by: OTOLARYNGOLOGY

## 2023-12-19 PROCEDURE — L8607 INJ VOCAL CORD BULKING AGENT: HCPCS | Performed by: OTOLARYNGOLOGY

## 2023-12-19 NOTE — PROGRESS NOTES
ASSESSMENT AND PLAN:   Karly Milton is a 73 y.o. 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 She underwent a vocal cord injection  0.6 ml to the left and 0.4 ml to the right. She tolerated this via transoral approach.     She will follow up in 2 months for a repeat evaluation.        Reason For Consult  No chief complaint on file.       HISTORY OF PRESENT ILLNESS:  Karly Milton is a 73 y.o. 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  The patient is here for a vocal cord injection.     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 She had mild irritation of the posterior arytenoid. These are likley contributing to her symptoms.      We discussed options to include speech therapy vs vocal cord injection vs thyroplasty. She elected to proceed with a vocal cord injection first.      Past Medical History  She has a past medical history of Acute bronchitis due to other specified organisms, Acute upper respiratory infection, unspecified, Other abnormal glucose, Personal history of diseases of the skin and subcutaneous tissue, Personal history of diseases of the skin and subcutaneous tissue, Personal history of other diseases of the circulatory system, Personal history of other diseases of the digestive system, Personal history of other diseases of the nervous system and sense organs, Personal history of other diseases of the respiratory system, Personal history of other endocrine, nutritional and metabolic disease, Pruritus, unspecified, Radiculopathy, cervical region, and Right upper quadrant pain. Surgical History  She has a past surgical history that includes Other surgical history (06/13/2022); Other surgical history (06/13/2022); Other surgical history (06/13/2022); and Other surgical history (06/13/2022).  "  Social History  She reports that she quit smoking about 38 years ago. Her smoking use included cigarettes. She has never used smokeless tobacco. She reports that she does not currently use alcohol. She reports that she does not use drugs. Allergies  Adhesive tape-silicones, Amoxicillin, Povidone-iodine, Statins-hmg-coa reductase inhibitors, Sulfa (sulfonamide antibiotics), and Nickel     Family History  No family history on file.    Review of Systems  All 10 systems were reviewed and negative except for above.      Last Recorded Vitals  Blood pressure 151/81, temperature 36.3 °C (97.3 °F), temperature source Temporal, height 1.727 m (5' 8\"), weight 95.1 kg (209 lb 9.6 oz).    Physical Exam  ENT Physical Exam  Constitutional  Appearance: patient appears well-developed and well-nourished,  Head and Face  Appearance: head appears normal and face appears normal;  Ear  Auricles: right auricle normal; left auricle normal;  Nose  External Nose: nares patent bilaterally;  Oral Cavity/Oropharynx  Lips: normal;  Neck  Neck: neck normal; neck palpation normal;  Respiratory  Inspection: no retractions visible;  Cardiovascular  Inspection: no peripheral edema present;  Neurovestibular  Mental Status: alert and oriented;  Psychiatric: mood normal;  Cranial Nerves: cranial nerves intact;             Procedures  We discussed the risks, benefits, and alternatives of intervention to include but not be limited to, bleeding and infection, damage to surrounding structures, as well as scarring. We further discussed the possibility of change in voice with persistent or worsened hoarseness, swallowing difficulty, breathing difficulty, medical complications, and risks of anesthesia.?Consent was signed and time out was performed.     Procedure Note:     Procedure: Endoscopic vocal cord injection -  Bilateral   Diagnosis:  Presbylarynges   Injection material:?Prolaryn Gel?     Technique: Transoral     After informed consent was obtained, a " time out was performed, and appropriate topical anesthesia to the nares and throat was applied,?the channeled endoscope was advanced. The topical lidocaine gargle was applied to the vocal cords. A transoral needle was advanced via the oral cavity and angled to the affected vocal fold. The injection was placed lateral to the vocal ligament and was completed with the following details:       0.6 ml to the left and 0.4 ml to the right-good medialization of vocal cord with improved contralateral contact and improved voice.     Patient tolerated the procedure well.       Time Spent  Prep time on day of patient encounter: 10 minutes  Time spent directly with patient, family or caregiver: 15 minutes  Additional Time Spent on Patient Care Activities: 5 minutes  Documentation Time: 10 minutes  Other Time Spent: 0 minutes  Total: 40 minutes     Scribe Attestation  By signing my name below, I, Ebonie Lindsey , Scribe attest that this documentation has been prepared under the direction and in the presence of Ayan Sullivan MD.

## 2023-12-29 PROBLEM — R00.2 PALPITATIONS: Status: ACTIVE | Noted: 2023-12-29

## 2023-12-29 PROBLEM — I48.91 ATRIAL FIBRILLATION (MULTI): Status: ACTIVE | Noted: 2023-12-29

## 2023-12-29 PROBLEM — G62.9 PERIPHERAL NEUROPATHY: Status: ACTIVE | Noted: 2023-12-29

## 2023-12-29 PROBLEM — E78.5 HYPERLIPIDEMIA: Status: ACTIVE | Noted: 2023-12-29

## 2023-12-29 PROBLEM — I51.9 LEFT VENTRICULAR DIASTOLIC DYSFUNCTION: Status: ACTIVE | Noted: 2023-12-29

## 2024-01-06 NOTE — PROGRESS NOTES
"Subjective   Patient ID: Karly Milton is a 73 y.o. female who presents for Follow-up (Patient here for follow up).    HPI patient is here for follow-up she has a history of CAD hypertension cervical radiculopathy and obesity she has done major change in her lifestyle and has lost more weight she feels great.  Blood work reviewed.    Review of Systems   Constitutional: Negative.    HENT: Negative.     Respiratory: Negative.     Cardiovascular: Negative.    Gastrointestinal: Negative.    Genitourinary: Negative.    Musculoskeletal:  Negative for gait problem.   Neurological: Negative.    Hematological: Negative.    Psychiatric/Behavioral: Negative.         Objective   /70 (BP Location: Left arm, Patient Position: Sitting, BP Cuff Size: Large adult)   Temp 36.2 °C (97.2 °F) (Temporal)   Ht 1.727 m (5' 8\")   Wt 94.3 kg (208 lb)   SpO2 99%   BMI 31.63 kg/m²     Physical Exam  Vitals reviewed.   Constitutional:       Appearance: Normal appearance.   HENT:      Head: Normocephalic.   Eyes:      Conjunctiva/sclera: Conjunctivae normal.      Pupils: Pupils are equal, round, and reactive to light.   Cardiovascular:      Rate and Rhythm: Normal rate and regular rhythm.   Pulmonary:      Effort: Pulmonary effort is normal.      Breath sounds: Normal breath sounds.   Neurological:      General: No focal deficit present.      Mental Status: She is alert and oriented to person, place, and time.   Psychiatric:         Mood and Affect: Mood normal.         Behavior: Behavior normal.         Assessment/Plan   Problem List Items Addressed This Visit       PVC (premature ventricular contraction)    Primary hypertension    ASHD (arteriosclerotic heart disease) - Primary    Cervical radiculopathy, chronic     Patient will continue on lisinopril hydrochlorothiazide for hypertension she is on low-dose aspirin for CAD she is going to follow-up with us in spring advised about COVID and flu shots.  She also is due for repeat " colonoscopy for precancerous lesion in the colon and she is going to get it done through her GI Dr. Fisher at [facility]      head/LACERATION

## 2024-01-29 ENCOUNTER — OFFICE VISIT (OUTPATIENT)
Dept: PRIMARY CARE | Facility: CLINIC | Age: 74
End: 2024-01-29
Payer: MEDICARE

## 2024-01-29 VITALS
TEMPERATURE: 97.8 F | HEART RATE: 64 BPM | SYSTOLIC BLOOD PRESSURE: 124 MMHG | WEIGHT: 214 LBS | BODY MASS INDEX: 32.43 KG/M2 | HEIGHT: 68 IN | DIASTOLIC BLOOD PRESSURE: 76 MMHG

## 2024-01-29 DIAGNOSIS — Z11.59 ENCOUNTER FOR HEPATITIS C SCREENING TEST FOR LOW RISK PATIENT: ICD-10-CM

## 2024-01-29 DIAGNOSIS — Z00.00 ROUTINE GENERAL MEDICAL EXAMINATION AT HEALTH CARE FACILITY: Primary | ICD-10-CM

## 2024-01-29 DIAGNOSIS — Z12.31 ENCOUNTER FOR SCREENING MAMMOGRAM FOR BREAST CANCER: ICD-10-CM

## 2024-01-29 DIAGNOSIS — Z00.00 MEDICARE ANNUAL WELLNESS VISIT, SUBSEQUENT: ICD-10-CM

## 2024-01-29 PROCEDURE — 1159F MED LIST DOCD IN RCRD: CPT | Performed by: INTERNAL MEDICINE

## 2024-01-29 PROCEDURE — 1036F TOBACCO NON-USER: CPT | Performed by: INTERNAL MEDICINE

## 2024-01-29 PROCEDURE — G0439 PPPS, SUBSEQ VISIT: HCPCS | Performed by: INTERNAL MEDICINE

## 2024-01-29 PROCEDURE — 3074F SYST BP LT 130 MM HG: CPT | Performed by: INTERNAL MEDICINE

## 2024-01-29 PROCEDURE — 3078F DIAST BP <80 MM HG: CPT | Performed by: INTERNAL MEDICINE

## 2024-01-29 PROCEDURE — 1160F RVW MEDS BY RX/DR IN RCRD: CPT | Performed by: INTERNAL MEDICINE

## 2024-01-29 ASSESSMENT — ENCOUNTER SYMPTOMS
MUSCULOSKELETAL NEGATIVE: 1
RESPIRATORY NEGATIVE: 1
CARDIOVASCULAR NEGATIVE: 1
GASTROINTESTINAL NEGATIVE: 1

## 2024-01-29 NOTE — PROGRESS NOTES
"Subjective   Reason for Visit: Karly Milton is an 73 y.o. female here for a Medicare Wellness visit.          Reviewed all medications by prescribing practitioner or clinical pharmacist (such as prescriptions, OTCs, herbal therapies and supplements) and documented in the medical record.    HPI patient here for Medicare wellness visit we discussed with her about of Prevnar vaccination and hepatitis C antibody testing also we recommend shingles vaccine    Patient Care Team:  Sheryl Ga MD as PCP - General  [facility] Ga MD [facility]     Review of Systems   Respiratory: Negative.     Cardiovascular: Negative.    Gastrointestinal: Negative.    Genitourinary: Negative.    Musculoskeletal: Negative.        Objective   Vitals:  /76   Pulse 64   Temp 36.6 °C (97.8 °F) (Temporal)   Ht 1.727 m (5' 8\")   Wt 97.1 kg (214 lb)   BMI 32.54 kg/m²       Physical Exam  Vitals reviewed.   Constitutional:       Appearance: Normal appearance.   HENT:      Head: Normocephalic.      Mouth/Throat:      Mouth: Mucous membranes are moist.   Eyes:      Pupils: Pupils are equal, round, and reactive to light.   Cardiovascular:      Rate and Rhythm: Normal rate and regular rhythm.      Heart sounds: Normal heart sounds.   Abdominal:      Palpations: Abdomen is soft.   Lymphadenopathy:      Cervical: No cervical adenopathy.   Neurological:      General: No focal deficit present.      Mental Status: She is alert and oriented to person, place, and time.         Assessment/Plan   Problem List Items Addressed This Visit       Medicare annual wellness visit, subsequent     Other Visit Diagnoses       Routine general medical examination at health care facility    -  Primary    Encounter for screening mammogram for breast cancer        Relevant Orders    BI mammo bilateral screening tomosynthesis    Encounter for hepatitis C screening test for low risk patient            We recommend Prevnar " 20 vaccination she had colonoscopy done with Dr. Fisher just couple months ago  .  Patient was having hair loss but now has noticed more hair growth.  Her weight has been stable.  She will continue B6 for B6 deficiency.  Continue aspirin with history of CAD.

## 2024-02-20 ENCOUNTER — OFFICE VISIT (OUTPATIENT)
Dept: OTOLARYNGOLOGY | Facility: CLINIC | Age: 74
End: 2024-02-20
Payer: MEDICARE

## 2024-02-20 VITALS — BODY MASS INDEX: 33.52 KG/M2 | TEMPERATURE: 97.5 F | WEIGHT: 221.2 LBS | HEIGHT: 68 IN

## 2024-02-20 DIAGNOSIS — J38.01 PARESIS OF LEFT VOCAL CORD: ICD-10-CM

## 2024-02-20 DIAGNOSIS — R49.0 VOICE HOARSENESS: ICD-10-CM

## 2024-02-20 DIAGNOSIS — R49.8 OTHER VOICE AND RESONANCE DISORDERS: ICD-10-CM

## 2024-02-20 DIAGNOSIS — J38.7 PRESBYLARYNGES: ICD-10-CM

## 2024-02-20 DIAGNOSIS — L29.9 EAR ITCHING: Primary | ICD-10-CM

## 2024-02-20 PROCEDURE — 31579 LARYNGOSCOPY TELESCOPIC: CPT | Performed by: OTOLARYNGOLOGY

## 2024-02-20 PROCEDURE — 99213 OFFICE O/P EST LOW 20 MIN: CPT | Performed by: OTOLARYNGOLOGY

## 2024-02-20 PROCEDURE — 1160F RVW MEDS BY RX/DR IN RCRD: CPT | Performed by: OTOLARYNGOLOGY

## 2024-02-20 PROCEDURE — 1036F TOBACCO NON-USER: CPT | Performed by: OTOLARYNGOLOGY

## 2024-02-20 PROCEDURE — 1159F MED LIST DOCD IN RCRD: CPT | Performed by: OTOLARYNGOLOGY

## 2024-02-20 ASSESSMENT — PATIENT HEALTH QUESTIONNAIRE - PHQ9
SUM OF ALL RESPONSES TO PHQ9 QUESTIONS 1 & 2: 0
2. FEELING DOWN, DEPRESSED OR HOPELESS: NOT AT ALL
1. LITTLE INTEREST OR PLEASURE IN DOING THINGS: NOT AT ALL

## 2024-02-20 NOTE — PROGRESS NOTES
ASSESSMENT AND PLAN:   Karly Milton is a 73 y.o. 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 She feels that her voice is doing better.    Today's examination to include stroboscopy demonstrates good closure    We discussed thyroplasty vs prolaryn plus in the OR if she needs it, however she feels that she is doing well. She has concerns for itchiness in the ears. She had a normal exam and we discussed topical treatment of the eczema of the ear canal.     I would like to see the patient back as needed         Reason For Consult  Chief Complaint   Patient presents with    Follow-up        HISTORY OF PRESENT ILLNESS:  Karly Milton is a 73 y.o. 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 She underwent a vocal cord injection.      The patient reports that it took her a couple of weeks to notice an improvement in her voice. Today, she reports intermittent voice hoarseness. She is not able to sing for long periods of time. She has lost her upper register.    The patient complaints of itchiness in her ears.     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 She underwent a vocal cord injection  0.6 ml to the left and 0.4 ml to the right. She tolerated this via transoral approach.      Past Medical History  She has a past medical history of Acute bronchitis due to other specified organisms, Acute upper respiratory infection, unspecified, Other abnormal glucose, Personal history of diseases of the skin and subcutaneous tissue, Personal history of diseases of the skin and subcutaneous tissue, Personal history of other diseases of the circulatory system, Personal history of other diseases of the digestive system, Personal history of other diseases of the nervous system and sense organs, Personal history of other diseases of the respiratory system, Personal  history of other endocrine, nutritional and metabolic disease, Pruritus, unspecified, Radiculopathy, cervical region, and Right upper quadrant pain. Surgical History  She has a past surgical history that includes Other surgical history (06/13/2022); Other surgical history (06/13/2022); Other surgical history (06/13/2022); and Other surgical history (06/13/2022).   Social History  She reports that she quit smoking about 39 years ago. Her smoking use included cigarettes. She has never used smokeless tobacco. She reports that she does not currently use alcohol. She reports that she does not use drugs. Allergies  Adhesive tape-silicones, Amoxicillin, Povidone-iodine, Statins-hmg-coa reductase inhibitors, Sulfa (sulfonamide antibiotics), and Nickel     Family History  Family History   Problem Relation Name Age of Onset    Pancreatic cancer Mother      Other (pacemaker) Father      Leukemia Father      Diabetes Paternal Grandmother         Review of Systems  All 10 systems were reviewed and negative except for above.      Last Recorded Vitals  There were no vitals taken for this visit.    Physical Exam  ENT Physical Exam  Constitutional  Appearance: patient appears well-developed and well-nourished,  Head and Face  Appearance: head appears normal and face appears normal;  Ear  Auricles: right auricle normal; left auricle normal;  Nose  External Nose: nares patent bilaterally;  Oral Cavity/Oropharynx  Lips: normal;  Neck  Neck: neck normal; neck palpation normal;  Respiratory  Inspection: no retractions visible;  Cardiovascular  Inspection: no peripheral edema present;  Neurovestibular  Mental Status: alert and oriented;  Psychiatric: mood normal;  Cranial Nerves: cranial nerves intact;          Procedures   Flexible Laryngoscopy w/ Videostroboscopy    VOICE AND SPEECH CHARACTERISTICS:  Normal spoken speech, (+) mild dysphonia, no roughness, no breathiness, no asthenia, (+) mild strain.    Intelligibility: normal.    Resonance: balanced.   Vocal Loudness: normal.   Breath Support: normal.    PROCEDURE:    Indications: voice change  PROCEDURE NOTE: FLEXIBLE LARYNGOSCOPY WITH STROBOSCOPY  I recommended a flexible laryngoscopy with stroboscopy based on PE findings, and/or concern for mucosal wave details based upon history and/or for issues associated with hyperreflexic gag on mirror exam concerning for pathology. Risks, benefits, and alternatives were explained. The patient wishes to proceed and gives verbal consent.   Patient is seated in the exam chair. After adequate topical anesthesia, I advance the flexible endoscope. The examination included evaluation of the mcdonald, vallecula, base of tongue, pyriforms, post-cricoid area, larynx and immediate subglottis.  Findings : assessment of the nasopharynx, base of tongue/vallecula, pyriform sinuses, post-cricoid area and pharyngeal walls was without lesion or mass, pharyngeal wall contraction is normal and symmetric, and no pooling of secretions  ventricular obliteration: 2 (present)  Gross Arytenoid Movement: limited adduction left.  Arytenoid Height: normal.   Supraglottic Tension: lateral.  Symmetry: normal.   Amplitude: normal.  Phase Closure: in-phase.  Mucosal Wave Lateral Excursion/Secondary Wave: Bilateral Vocal Cord: no restriction - wave moved more than ½ the width of the vocal fold.  Periodicity: normal.  Closure: post. gap.      Time Spent  Prep time on day of patient encounter: 10 minutes  Time spent directly with patient, family or caregiver: 15 minutes  Additional Time Spent on Patient Care Activities/Discussion with SLP re care plan: 5 minutes  Documentation Time: 10 minutes  Other Time Spent: 0 minutes  Total: 40 minutes       Scribe Attestation  By signing my name below, IEbonie , Scrpanchito attest that this documentation has been prepared under the direction and in the presence of Ayan Sullivan MD.

## 2024-02-26 DIAGNOSIS — I10 PRIMARY HYPERTENSION: ICD-10-CM

## 2024-02-26 RX ORDER — LISINOPRIL AND HYDROCHLOROTHIAZIDE 12.5; 2 MG/1; MG/1
1 TABLET ORAL 2 TIMES DAILY
Qty: 90 TABLET | Refills: 1 | Status: SHIPPED | OUTPATIENT
Start: 2024-02-26 | End: 2024-04-04

## 2024-03-06 ENCOUNTER — TELEPHONE (OUTPATIENT)
Dept: PRIMARY CARE CLINIC | Age: 74
End: 2024-03-06

## 2024-03-06 NOTE — TELEPHONE ENCOUNTER
PT called requesting this medication to be refilled    nystatin-triamcinolone (MYCOLOG II) 259750-5.1 UNIT/GM-% cream     Please have it sent to [facility]

## 2024-03-14 ENCOUNTER — CLINICAL SUPPORT (OUTPATIENT)
Dept: ORTHOPEDIC SURGERY | Facility: CLINIC | Age: 74
End: 2024-03-14
Payer: MEDICARE

## 2024-03-14 ENCOUNTER — OFFICE VISIT (OUTPATIENT)
Dept: ORTHOPEDIC SURGERY | Facility: CLINIC | Age: 74
End: 2024-03-14
Payer: MEDICARE

## 2024-03-14 DIAGNOSIS — M25.561 ACUTE PAIN OF BOTH KNEES: ICD-10-CM

## 2024-03-14 DIAGNOSIS — M25.562 ACUTE PAIN OF BOTH KNEES: ICD-10-CM

## 2024-03-14 DIAGNOSIS — M17.0 PRIMARY OSTEOARTHRITIS OF BOTH KNEES: Primary | ICD-10-CM

## 2024-03-14 DIAGNOSIS — M25.562 ACUTE PAIN OF LEFT KNEE: ICD-10-CM

## 2024-03-14 PROCEDURE — 1036F TOBACCO NON-USER: CPT | Performed by: INTERNAL MEDICINE

## 2024-03-14 PROCEDURE — 1160F RVW MEDS BY RX/DR IN RCRD: CPT | Performed by: INTERNAL MEDICINE

## 2024-03-14 PROCEDURE — 20610 DRAIN/INJ JOINT/BURSA W/O US: CPT | Performed by: INTERNAL MEDICINE

## 2024-03-14 PROCEDURE — 1159F MED LIST DOCD IN RCRD: CPT | Performed by: INTERNAL MEDICINE

## 2024-03-14 PROCEDURE — 99213 OFFICE O/P EST LOW 20 MIN: CPT | Performed by: INTERNAL MEDICINE

## 2024-03-14 RX ORDER — LIDOCAINE HYDROCHLORIDE 10 MG/ML
5 INJECTION INFILTRATION; PERINEURAL
Status: COMPLETED | OUTPATIENT
Start: 2024-03-14 | End: 2024-03-14

## 2024-03-14 RX ORDER — BETAMETHASONE SODIUM PHOSPHATE AND BETAMETHASONE ACETATE 3; 3 MG/ML; MG/ML
2 INJECTION, SUSPENSION INTRA-ARTICULAR; INTRALESIONAL; INTRAMUSCULAR; SOFT TISSUE
Status: COMPLETED | OUTPATIENT
Start: 2024-03-14 | End: 2024-03-14

## 2024-03-14 RX ADMIN — BETAMETHASONE SODIUM PHOSPHATE AND BETAMETHASONE ACETATE 2 ML: 3; 3 INJECTION, SUSPENSION INTRA-ARTICULAR; INTRALESIONAL; INTRAMUSCULAR; SOFT TISSUE at 13:56

## 2024-03-14 RX ADMIN — LIDOCAINE HYDROCHLORIDE 5 ML: 10 INJECTION INFILTRATION; PERINEURAL at 13:56

## 2024-03-14 NOTE — PROGRESS NOTES
CC:   Chief Complaint   Patient presents with    Left Knee - Pain     Established patient, new problem, x-rays today    Right Knee - Pain       HPI: Karly is a 73 y.o. female presents for evaluation for bilateral knee pain secondary to known osteoarthritis. She is here for bilateral knee cortisone injections. She has had cortisone and gel injections several years ago.        Review of Systems   GENERAL: Negative for malaise, significant weight loss, fever  MUSCULOSKELETAL: See HPI  NEURO:  Negative for numbness / tingling     Past Medical History  Past Medical History:   Diagnosis Date    Acute bronchitis due to other specified organisms     Acute bacterial bronchitis    Acute upper respiratory infection, unspecified     URTI (acute upper respiratory infection)    Other abnormal glucose     Abnormal blood sugar    Personal history of diseases of the skin and subcutaneous tissue     History of eczema    Personal history of diseases of the skin and subcutaneous tissue     History of hidradenitis suppurativa    Personal history of other diseases of the circulatory system     History of stenosis of renal artery    Personal history of other diseases of the digestive system     History of gastroesophageal reflux (GERD)    Personal history of other diseases of the nervous system and sense organs     History of carpal tunnel syndrome    Personal history of other diseases of the respiratory system     History of common cold    Personal history of other endocrine, nutritional and metabolic disease     History of hypercholesterolemia    Pruritus, unspecified     Itching of ear    Radiculopathy, cervical region     Acute cervical radiculopathy    Right upper quadrant pain     Right upper quadrant abdominal pain       Medication review  Medication Documentation Review Audit       Reviewed by MATTEO Buckley (Patient Care Technician) on 02/20/24 at 1342      Medication Order Taking? Sig Documenting Provider Last Dose  Status   aspirin 81 mg EC tablet [id number]  Take 1 tablet (81 mg) by mouth once daily. Historical Provider, MD  Active   betamethasone valerate (Valisone) 0.1 % cream [id number]  Apply 1 Application topically if needed. Historical Provider, MD  Active   calcium carbonate-vitamin D3 500 mg-3.125 mcg (125 unit) tablet tablet [id number]  Take 500 mg by mouth once daily. Isrrael Mcmillan MD  Active   cholecalciferol (Vitamin D-3) 25 MCG (1000 UT) tablet [id number]  Take 1 tablet (25 mcg) by mouth once daily. Historical Provider, MD  Active   lisinopriL-hydrochlorothiazide 20-12.5 mg tablet [id number]  Take 1 tablet by mouth 2 times a day. Sheryl Ga MD  Active   magnesium oxide (Mag-Ox) 400 mg (241.3 mg magnesium) tablet [id number]  Take 1 tablet (400 mg) by mouth 3 times a day. Historical Provider, MD  Active   nystatin-triamcinolone (Mycolog II) cream [id number]  Apply 1 Application topically if needed. Historical Provider, MD  Active   pyridoxine (Vitamin B-6) 50 mg tablet [id number]  Take 1 tablet (50 mg) by mouth once daily. Historical Provider, MD  Active   triamcinolone (Kenalog) 0.1 % cream [id number]  Apply 1 Application topically every 12 hours if needed. Historical Provider, MD  Active   vitamin E acid succinate (vitamin E succinate) 67 mg (100 unit) tablet [id number]  Take 1 tablet (67 mg) by mouth once daily. Historical Provider, MD  Active                    Allergies  Allergies   Allergen Reactions    Adhesive Tape-Silicones Other    Amoxicillin Hives    Povidone-Iodine Hives    Statins-Hmg-Coa Reductase Inhibitors Other    Sulfa (Sulfonamide Antibiotics) Other    Nickel Rash       Social History  Social History     Socioeconomic History    Marital status:      Spouse name: Not on file    Number of children: Not on file    Years of education: Not on file    Highest education level: Not on file   Occupational History    Not on file   Tobacco Use    Smoking status: Former     Types: Cigarettes      Quit date:      Years since quittin.2    Smokeless tobacco: Never   Vaping Use    Vaping Use: Never used   Substance and Sexual Activity    Alcohol use: Not Currently    Drug use: Never    Sexual activity: Defer   Other Topics Concern    Not on file   Social History Narrative    Not on file     Social Determinants of Health     Financial Resource Strain: Not on file   Food Insecurity: Not on file   Transportation Needs: Not on file   Physical Activity: Not on file   Stress: Not on file   Social Connections: Not on file   Intimate Partner Violence: Not on file   Housing Stability: Not on file       Surgical History  Past Surgical History:   Procedure Laterality Date    OTHER SURGICAL HISTORY  2022    Appendectomy    OTHER SURGICAL HISTORY  2022    Colonoscopy    OTHER SURGICAL HISTORY  2022    Cholecystectomy    OTHER SURGICAL HISTORY  2022    Knee surgery       Physical Exam:  GENERAL:  Patient is awake, alert, and oriented to person place and time.  Patient appears well nourished and well kept.  Affect Calm, Not Acutely Distressed.  HEENT:  Normocephalic, Atraumatic, EOMI  CARDIOVASCULAR:  Hemodynamically stable.  RESPIRATORY:  Normal respirations with unlabored breathing.  Extremity: ***      Diagnostics: X-rays reviewed  OUTSIDE GENERIC TESTING  Ordered by an unspecified provider.        Procedure: ***    Assessment: Bilateral osteoarthritis    Plan: Karly presents today for bilateral knee corticosteroid injections for known osteoarthritis. She tolerated the injections today. She felt immediate relief after the injections.     Orders Placed This Encounter    XR knee left 3 views    XR knee 3 views bilateral      At the conclusion of the visit there were no further questions by the patient/family regarding their plan of care.  Patient was instructed to call or return with any issues, questions, or concerns regarding their injury and/or treatment plan described above.     24 at  1:13 PM - Navid Smith MD  Scribe Attestation  By signing my name below, I, Jeovany Elvie Trevino   attest that this documentation has been prepared under the direction and in the presence of Navid Smith MD.    Office: [phone]    This note was prepared using voice recognition software.  The details of this note are correct and have been reviewed, and corrected to the best of my ability.  Some grammatical errors may persist related to the Dragon software.

## 2024-03-14 NOTE — PROGRESS NOTES
CC:   Chief Complaint   Patient presents with    Left Knee - Pain     Established patient, new problem, x-rays today       HPI: Karly is a 73 y.o. female presents for eval knee pain secondary to known arthritis in both knees.  She had previous corticosteroid injection and viscosupplementation injection back in 2021 which gave relief up until now.  There is no new fall or traumatic injury.  No recent infection.  Her arthritis pain is begin to worsen and she is here requesting repeat injections.          Review of Systems   GENERAL: Negative for malaise, significant weight loss, fever  MUSCULOSKELETAL: See HPI  NEURO:  Negative for numbness / tingling     Past Medical History  Past Medical History:   Diagnosis Date    Acute bronchitis due to other specified organisms     Acute bacterial bronchitis    Acute upper respiratory infection, unspecified     URTI (acute upper respiratory infection)    Other abnormal glucose     Abnormal blood sugar    Personal history of diseases of the skin and subcutaneous tissue     History of eczema    Personal history of diseases of the skin and subcutaneous tissue     History of hidradenitis suppurativa    Personal history of other diseases of the circulatory system     History of stenosis of renal artery    Personal history of other diseases of the digestive system     History of gastroesophageal reflux (GERD)    Personal history of other diseases of the nervous system and sense organs     History of carpal tunnel syndrome    Personal history of other diseases of the respiratory system     History of common cold    Personal history of other endocrine, nutritional and metabolic disease     History of hypercholesterolemia    Pruritus, unspecified     Itching of ear    Radiculopathy, cervical region     Acute cervical radiculopathy    Right upper quadrant pain     Right upper quadrant abdominal pain       Medication review  Medication Documentation Review Audit       Reviewed by  MATTEO Buckley (Patient Care Technician) on 02/20/24 at 1342      Medication Order Taking? Sig Documenting Provider Last Dose Status   aspirin 81 mg EC tablet [id number]  Take 1 tablet (81 mg) by mouth once daily. Historical Provider, MD  Active   betamethasone valerate (Valisone) 0.1 % cream [id number]  Apply 1 Application topically if needed. Isrrael Mcmillan MD  Active   calcium carbonate-vitamin D3 500 mg-3.125 mcg (125 unit) tablet tablet [id number]  Take 500 mg by mouth once daily. Isrrael Provider, MD  Active   cholecalciferol (Vitamin D-3) 25 MCG (1000 UT) tablet [id number]  Take 1 tablet (25 mcg) by mouth once daily. Isrrael Mcmillan MD  Active   lisinopriL-hydrochlorothiazide 20-12.5 mg tablet [id number]  Take 1 tablet by mouth 2 times a day. Sheryl Ga MD  Active   magnesium oxide (Mag-Ox) 400 mg (241.3 mg magnesium) tablet [id number]  Take 1 tablet (400 mg) by mouth 3 times a day. Historical Provider, MD  Active   nystatin-triamcinolone (Mycolog II) cream [id number]  Apply 1 Application topically if needed. Historical Provider, MD  Active   pyridoxine (Vitamin B-6) 50 mg tablet [id number]  Take 1 tablet (50 mg) by mouth once daily. Historical Provider, MD  Active   triamcinolone (Kenalog) 0.1 % cream [id number]  Apply 1 Application topically every 12 hours if needed. Historical Provider, MD  Active   vitamin E acid succinate (vitamin E succinate) 67 mg (100 unit) tablet [id number]  Take 1 tablet (67 mg) by mouth once daily. Historical Provider, MD  Active                    Allergies  Allergies   Allergen Reactions    Adhesive Tape-Silicones Other    Amoxicillin Hives    Povidone-Iodine Hives    Statins-Hmg-Coa Reductase Inhibitors Other    Sulfa (Sulfonamide Antibiotics) Other    Nickel Rash       Social History  Social History     Socioeconomic History    Marital status:      Spouse name: Not on file    Number of children: Not on file    Years of education: Not on file    Highest  education level: Not on file   Occupational History    Not on file   Tobacco Use    Smoking status: Former     Types: Cigarettes     Quit date:      Years since quittin.2    Smokeless tobacco: Never   Vaping Use    Vaping Use: Never used   Substance and Sexual Activity    Alcohol use: Not Currently    Drug use: Never    Sexual activity: Defer   Other Topics Concern    Not on file   Social History Narrative    Not on file     Social Determinants of Health     Financial Resource Strain: Not on file   Food Insecurity: Not on file   Transportation Needs: Not on file   Physical Activity: Not on file   Stress: Not on file   Social Connections: Not on file   Intimate Partner Violence: Not on file   Housing Stability: Not on file       Surgical History  Past Surgical History:   Procedure Laterality Date    OTHER SURGICAL HISTORY  2022    Appendectomy    OTHER SURGICAL HISTORY  2022    Colonoscopy    OTHER SURGICAL HISTORY  2022    Cholecystectomy    OTHER SURGICAL HISTORY  2022    Knee surgery       Physical Exam:  GENERAL:  Patient is awake, alert, and oriented to person place and time.  Patient appears well nourished and well kept.  Affect Calm, Not Acutely Distressed.  HEENT:  Normocephalic, Atraumatic, EOMI  CARDIOVASCULAR:  Hemodynamically stable.  RESPIRATORY:  Normal respirations with unlabored breathing.  Extremity: Right knee shows skin is intact.  1+ effusion.  She can flex her right knee to 125 degrees with pain.  Full extension is 0 degrees.  Pain of the medial and lateral joint line.  Patellar and quadricep mechanism intact.  1-2+ patellar crepitus.  Negative Lachman's test.  Negative Hussein's test.  Negative valgus and varus stresses.  Negative anterior and posterior drawer test.  Distal pulses are palpable.    Leftknee shows skin is intact.  Trace to 1+ effusion.  She can flex her right knee to 125 degrees with pain.  Full extension is 0 degrees.  Pain of the medial and  lateral joint line.  Patellar and quadricep mechanism intact.  1-2+ patellar crepitus.  Negative Lachman's test.  Negative Hussein's test.  Negative valgus and varus stresses.  Negative anterior and posterior drawer test.  Distal pulses are palpable.    Diagnostics: Previous x-rays reviewed        Procedure: L Inj/Asp: bilateral knee on 3/14/2024 1:56 PM  Indications: pain  Details: 22 G needle, lateral approach  Medications (Right): 2 mL betamethasone acet,sod phos 6 mg/mL; 5 mL lidocaine 10 mg/mL (1 %)  Medications (Left): 2 mL betamethasone acet,sod phos 6 mg/mL; 5 mL lidocaine 10 mg/mL (1 %)  Outcome: tolerated well, no immediate complications  Procedure, treatment alternatives, risks and benefits explained, specific risks discussed. Consent was given by the patient. Immediately prior to procedure a time out was called to verify the correct patient, procedure, equipment, support staff and site/side marked as required. Patient was prepped and draped in the usual sterile fashion.             Assessment: Bilateral knee osteoarthritis    Plan: Karly presents here for ball knee pain secondary to known arthritis in both knees.  She has responded well to previous corticosteroid injection in the past, her last injections were 3 years ago.  She is requesting a repeat injection today.  Risk and benefits of the injections were discussed, she was agreeable for the injections.  Immediately after injection she improvement of her pain symptoms.  We will submit for 3 series viscosupplementation injections of both knees which she responded well to in the past.  She is not interested in any knee replacement at this time.    Orders Placed This Encounter    XR knee left 3 views    XR knee 3 views bilateral      At the conclusion of the visit there were no further questions by the patient/family regarding their plan of care.  Patient was instructed to call or return with any issues, questions, or concerns regarding their injury  and/or treatment plan described above.     03/14/24 at 1:11 PM - Navid Smith MD    Office: [phone]    This note was prepared using voice recognition software.  The details of this note are correct and have been reviewed, and corrected to the best of my ability.  Some grammatical errors may persist related to the Dragon software.

## 2024-03-29 ENCOUNTER — OFFICE VISIT (OUTPATIENT)
Dept: CARDIOLOGY | Facility: CLINIC | Age: 74
End: 2024-03-29
Payer: MEDICARE

## 2024-03-29 VITALS
BODY MASS INDEX: 33.92 KG/M2 | HEART RATE: 65 BPM | DIASTOLIC BLOOD PRESSURE: 62 MMHG | WEIGHT: 219.8 LBS | SYSTOLIC BLOOD PRESSURE: 116 MMHG

## 2024-03-29 DIAGNOSIS — I48.0 PAROXYSMAL ATRIAL FIBRILLATION (MULTI): ICD-10-CM

## 2024-03-29 DIAGNOSIS — I25.10 ASHD (ARTERIOSCLEROTIC HEART DISEASE): ICD-10-CM

## 2024-03-29 DIAGNOSIS — E78.2 MIXED HYPERLIPIDEMIA: ICD-10-CM

## 2024-03-29 DIAGNOSIS — R00.2 PALPITATIONS: ICD-10-CM

## 2024-03-29 DIAGNOSIS — Z87.891 FORMER SMOKER: ICD-10-CM

## 2024-03-29 DIAGNOSIS — I51.9 LEFT VENTRICULAR DIASTOLIC DYSFUNCTION: ICD-10-CM

## 2024-03-29 DIAGNOSIS — I49.3 PVC (PREMATURE VENTRICULAR CONTRACTION): ICD-10-CM

## 2024-03-29 PROCEDURE — 93000 ELECTROCARDIOGRAM COMPLETE: CPT | Performed by: INTERNAL MEDICINE

## 2024-03-29 PROCEDURE — 1036F TOBACCO NON-USER: CPT | Performed by: INTERNAL MEDICINE

## 2024-03-29 PROCEDURE — 1159F MED LIST DOCD IN RCRD: CPT | Performed by: INTERNAL MEDICINE

## 2024-03-29 PROCEDURE — 3074F SYST BP LT 130 MM HG: CPT | Performed by: INTERNAL MEDICINE

## 2024-03-29 PROCEDURE — 99213 OFFICE O/P EST LOW 20 MIN: CPT | Performed by: INTERNAL MEDICINE

## 2024-03-29 PROCEDURE — 3078F DIAST BP <80 MM HG: CPT | Performed by: INTERNAL MEDICINE

## 2024-03-29 PROCEDURE — 1160F RVW MEDS BY RX/DR IN RCRD: CPT | Performed by: INTERNAL MEDICINE

## 2024-03-29 ASSESSMENT — ENCOUNTER SYMPTOMS
NEAR-SYNCOPE: 0
PALPITATIONS: 0
SHORTNESS OF BREATH: 0
IRREGULAR HEARTBEAT: 0
SYNCOPE: 0
DYSPNEA ON EXERTION: 0

## 2024-03-29 NOTE — PROGRESS NOTES
"Chief Complaint:   Follow-up (Pt is following up after 1 year )     History Of Present Illness:    Karly Milton is a 73 y.o. female presenting with follow-up.  She feels \"great.\"    She has no arrhythmia symptoms  Last Recorded Vitals:  Vitals:    03/29/24 1302   BP: 116/62   BP Location: Left arm   Patient Position: Sitting   Pulse: 65   Weight: 99.7 kg (219 lb 12.8 oz)       Past Medical History:  See list    Past Surgical History:  See list      Social History:  She reports that she quit smoking about 39 years ago. Her smoking use included cigarettes. She has never used smokeless tobacco. She reports current alcohol use. She reports that she does not use drugs.    Family History:  Family History   Problem Relation Name Age of Onset    Pancreatic cancer Mother      Other (pacemaker) Father      Leukemia Father      Diabetes Paternal Grandmother          Allergies:  Adhesive tape-silicones, Amoxicillin, Povidone-iodine, Statins-hmg-coa reductase inhibitors, Sulfa (sulfonamide antibiotics), and Nickel    Outpatient Medications:  Current Outpatient Medications   Medication Instructions    aspirin 81 mg EC tablet 1 tablet, oral, Daily    betamethasone valerate (Valisone) 0.1 % cream 1 Application, Topical, As needed    calcium carbonate-vitamin D3 500 mg-3.125 mcg (125 unit) tablet tablet 500 mg, oral, Daily    cholecalciferol (Vitamin D-3) 25 MCG (1000 UT) tablet 1 tablet, oral, Daily    lisinopriL-hydrochlorothiazide 20-12.5 mg tablet 1 tablet, oral, 2 times daily    magnesium oxide (Mag-Ox) 400 mg (241.3 mg magnesium) tablet 1 tablet, oral, 3 times daily    nystatin-triamcinolone (Mycolog II) cream 1 Application, Topical, As needed    pyridoxine (Vitamin B-6) 50 mg tablet 1 tablet, oral, Daily    triamcinolone (Kenalog) 0.1 % cream 1 Application, Topical, Every 12 hours PRN    vitamin E acid succinate (vitamin E succinate) 67 mg (100 unit) tablet 1 tablet, oral, Daily     Review of Systems   Constitutional: " [facility]    PATIENT: Josselyn Romero  MRN: [id number]  DATE: 8/29/2022    Diagnosis:  Iron deficiency anemia, Chron's disease.     Chief Complaint: None.     Hematology History:   Josselyn is a 27 yo who was diagnosed with Crohn's 5 years ago. Her symptoms have been much worse in the past year. She has been tried on busedonide and imuran, which were not helpful. She is now on Humira, and her dose is being increased from 40 mg to 80 mg. She notes blood in her stool most every bowel movement. She feels tired and achy, and she has a pica for ice. She is anemic with low iron studies.    Subjective:     Interval History:  8/29/2022:   Patient presents to clinic today for a 10 week follow-up visit. Received IV iron(Fereheme) on 5/23/2022 and 6/2/2022. States tolerated well. No rashes, dizziness, fainting or nausea.   States she is feeling better with increasing energy, decrease in stomachaches and no ice cravings.  States her Crohn's continues to be well managed On Entyvio.  She is seen gastroenterologist about every 3 months.  Receiving treatment every 4 weeks.  Denies any gastrointestinal bleeding. Last menses was around 8/22/22. States first couple days are her heaviest, but is unchanged from before, and not excessive.   Reports a good appetite. States she has started exercising. Performs video aerobics/walking. Enjoying it.     5/16/2022:  Ms. Romero presents to clinic today for a 2 month follow up visit.   She reports she took her iron tablet for only 4-5 days. Felt queasy when she would take it. She tried taking it with OJ.   States she is feeling good. Don't get SOB or tired like she did last year.   Continues to crave ice, unchanged from before.   Reports LNMP as 4/28/22. Spouse has had a vasectomy.   Receives monthly B-12 injections for history of pernicious anemia. Last injection was on 4/27/2022.     03/28/2022: Patient presents to clinic today for a follow up visit. She no-showed her  Negative for malaise/fatigue.   Cardiovascular:  Positive for leg swelling. Negative for chest pain, dyspnea on exertion, irregular heartbeat, near-syncope, palpitations and syncope.   Respiratory:  Negative for shortness of breath.        Physical Exam:  Physical Exam  Cardiovascular:      Rate and Rhythm: Normal rate.      Pulses: Normal pulses.      Heart sounds: Normal heart sounds.   Pulmonary:      Effort: Pulmonary effort is normal.   Musculoskeletal:      Right lower le+ Edema present.      Left lower leg: Edema present.   Neurological:      General: No focal deficit present.      Mental Status: She is alert.            Last Labs:  CBC -  Lab Results   Component Value Date    WBC 7.2 10/03/2023    HGB 14.5 10/03/2023    HCT 41.6 10/03/2023    MCV 86 10/03/2023     10/03/2023       CMP -  Lab Results   Component Value Date    CALCIUM 9.4 10/03/2023    PROT 6.6 10/03/2023    ALBUMIN 4.2 10/03/2023    AST 28 10/03/2023    ALT 26 10/03/2023    ALKPHOS 45 10/03/2023    BILITOT 0.7 10/03/2023       LIPID PANEL -   Lab Results   Component Value Date    CHOL 166 10/03/2023    TRIG 78 10/03/2023    HDL 54.8 10/03/2023    CHHDL 3.0 10/03/2023    LDLF 109 (H) 09/15/2022    VLDL 16 10/03/2023    NHDL 111 10/03/2023       RENAL FUNCTION PANEL -   Lab Results   Component Value Date    GLUCOSE 91 10/03/2023     10/03/2023    K 4.0 10/03/2023     10/03/2023    CO2 26 10/03/2023    ANIONGAP 13 10/03/2023    BUN 16 10/03/2023    CREATININE 0.85 10/03/2023    CALCIUM 9.4 10/03/2023    ALBUMIN 4.2 10/03/2023        Lab Results   Component Value Date    HGBA1C 4.8 10/03/2023       Last Cardiology Tests:  ECG:    Today.  Normal sinus rhythm.  Sinus arrhythmia.  Normal axis.  Corrected QT interval 450 ms.  Right bundle branch block    Echocardiogram . Normal chamber sizes. Normal ejection fraction. No significant valvular heart disease  Stress test 2017. Right bundle branch block. Ejection  last appointment on 3/7/2022.  Takes a Flinstone multivitamin w/ iron. Forgets to take MVI quite often. Last dose was this past Friday. Prior to that maybe a week or so.  Energy level good, much better than in the past.  Pica(craving ice chips) has returned. Started about 3 weeks ago.  Denies any SOB w/ exertion. No dizziness or heart palpitations.  LNMP was one month ago, she is due. Denies any chance of pregnancy. Spouse has had a vasectomy.     1/10/22 Josselyn returns for follow up of JUVENAL and B-12 deficiency related to her Crohn's disease. She is awaiting insurance approval to increase frequency of Entyvio. She is feeling very well, with good energy level. No pica. She had COVID in November with head cold symptoms. She had slacked off of her oral iron because she sometimes forgot to take it.    11/15/21 Josselyn returns for JUVENAL and pernicious anemia secondary to Crohn's disease. she is doing well and her activity level has increased. She is now on q 8 week Entyvio infusions. She denies sob, fatigue, weakness, pica, epistaxis, hematochezia, or melena. She continues to take her chewable vitamin with iron BID, however, she is taking it with food.    Past Medical History: Anxiety, Chron's disease, Gallbladder anomaly, GERD, Panic attack, seasonal allergies.     Past Surgical HIstory: Cholecystectomy(laparoscopic) 03/23/2017, resection of gallbladder, percutaneous endoscopic approach(3/23/2017)  Anesthesia for endoscope, LEEP, Colonoscopy, tonsil.     Family History: Scoliosis - mother and brother.     Social History:  reports that she has quit smoking. Her smoking use included vaping with nicotine. She has never used smokeless tobacco.    Allergies:  Review of patient's allergies indicates:   Allergen Reactions    Budesonide      Other reaction(s): Stomach ache    Codeine      Other reaction(s): unknown    Azathioprine Nausea And Vomiting     Review of Systems:  A complete 12 point ROS done with pertinent positives as  "described in interval history.  Remainder of ROS done in full and are negative.    ECOG Performance Status: 0   Objective:   Vitals:   /69   BP Location Right arm   Patient Position Sitting   BP Method Medium (Automatic)   Pulse 82   Resp 20   Temp 98.5 °F (36.9 °C)   Temp src Oral   SpO2 97 %   Weight  74 kg (163 lb 1.6 oz)   Height 5' 6.24" (1.682 m)   Pain Score 0-No pain     BMI: 26.13 kg/m2    Physical Exam:   General: Neatly groomed. Well-developed well-nourished in no acute distress  Eye: PERRL, EOMI, pale conjunctiva, sclera anicteric.  HENT: Oral cavity and oropharynx moist. No petechiae or paleness.  Respiratory: clear to auscultation bilaterally. No labored breathing. 02 sats 99% on RA  Cardiovascular: regular rate and rhythm without murmurs, gallops or rubs  Gastrointestinal: soft, non-tender, non-distended with normal bowel sounds, without masses to palpation  Musculoskeletal: good ROM  upper and lower extremities.  Integumentary: no rashes or skin lesions present. No bruising to exposed skin.  Neurologic: motor/sensory function intact .  Psych: Normal mood and affect.     Laboratory results:  Lab Results   Component Value Date    WBC 6.5 02/14/2022    RBC 4.59 02/14/2022    HGB 11.8 02/14/2022    HCT 37.9 02/14/2022    MCV 82.6 02/14/2022    MCH 25.7 02/14/2022    MCHC 31.1 02/14/2022    RDW 12.7 02/14/2022     02/14/2022    MPV 8.4 02/14/2022        CMP  Lab Results   Component Value Date     02/14/2022    K 4.0 02/14/2022    CO2 28 02/14/2022    BUN 13.9 02/14/2022    CREATININE 0.61 02/14/2022    CALCIUM 9.0 02/14/2022    ALBUMIN 3.3 02/14/2022    BILITOT 0.3 02/14/2022    ALKPHOS 85 02/14/2022    AST 17 02/14/2022    ALT 18 02/14/2022    EGFRNONAA >60 02/14/2022 08/29/2022 - WBC 6.41, Hgb 13.5, MCV 86.0, iron 43, ferritin 15, normal CMP. B-12 pending.   05/16/2022 - WBC 7.19, HGB 10.9, MCV 75.3, iron 17, ferritin 7, sat 4, TIBC 397, unremarkable CMP,    3/24/22 - B12 - " fraction 80%  Catheterization 2007. Mild coronary disease           Lab review: I have personally reviewed the laboratory result(s) see above    Assessment/Plan   Problem List Items Addressed This Visit       PVC (premature ventricular contraction)    ASHD (arteriosclerotic heart disease)    Atrial fibrillation (CMS/HCC)    Hyperlipidemia    Left ventricular diastolic dysfunction    Palpitations    BMI 33.0-33.9,adult    Former smoker       Palpitation with PVCs . Holter monitors have shown no atrial fibrillation. Unable to tolerate metoprolol.  [facility] magnesium oxide.  New prescription sent.    Normal LVEF and impaired relaxation by echocardiogram 2022. Intolerant of beta blocker.  Normal LVEF by stress test in 2017.   Mild coronary artery disease by cath 2007. Chronic. Stable. Asymptomatic.  Intermittent RBBB by chart review. Currently with no IVCD. Stable asymptomatic.  Overweight     AHA recommendations for exercise, diet, and behavioral modification reviewed with pt.     The patient and I discussed the mechanism of arrhythmia, normal conduction, ECG, sinus rhythm, sinus arrhythmia, PVCs, magnesium, indications for and types of medications, discussion if and what medication refills needed, treatment options, risks, benefits, and imponderables. American Heart Association lifestyle changes and behavioral modification discussed. All questions answered in detail. Counseling over 50% visit regarding above. Patient appreciative of care.       Daysi Ray MD   [phone]    Assessment:      1. Iron deficiency anemia D50.9  Patient intolerant to oral iron. Iron levels improved.   Still some room for improvement.   Received IV iron 2 doses on 5/23/22 and 6/2/2022.     --Chron's disease. On Entyvio. States symptoms are much better.    seeing gastroenterologist every 3 months.    2. Pernicious anemia D51.0       B-12 level pending today.       Will continue once monthly B-12 injection. B-12 given today, next due 9/26/2022     Get PA for another treatment with IV iron(Fereheme).   RTC in 10 weeks with MD for reevaluation. CBC, CMP, iron and Ferritin to be done prior.   Call in the interim for any concerns or questions.         Blessing Floyd, TANGC

## 2024-04-04 DIAGNOSIS — I10 PRIMARY HYPERTENSION: ICD-10-CM

## 2024-04-04 RX ORDER — LISINOPRIL AND HYDROCHLOROTHIAZIDE 12.5; 2 MG/1; MG/1
1 TABLET ORAL 2 TIMES DAILY
Qty: 135 TABLET | Refills: 1 | Status: SHIPPED | OUTPATIENT
Start: 2024-04-04

## 2024-05-02 ENCOUNTER — APPOINTMENT (OUTPATIENT)
Dept: ORTHOPEDIC SURGERY | Facility: CLINIC | Age: 74
End: 2024-05-02
Payer: MEDICARE

## 2024-05-07 ENCOUNTER — OFFICE VISIT (OUTPATIENT)
Dept: ORTHOPEDIC SURGERY | Facility: CLINIC | Age: 74
End: 2024-05-07
Payer: MEDICARE

## 2024-05-07 DIAGNOSIS — M17.0 PRIMARY OSTEOARTHRITIS OF BOTH KNEES: Primary | ICD-10-CM

## 2024-05-07 PROCEDURE — 20610 DRAIN/INJ JOINT/BURSA W/O US: CPT | Performed by: INTERNAL MEDICINE

## 2024-05-07 PROCEDURE — 1160F RVW MEDS BY RX/DR IN RCRD: CPT | Performed by: INTERNAL MEDICINE

## 2024-05-07 PROCEDURE — 1159F MED LIST DOCD IN RCRD: CPT | Performed by: INTERNAL MEDICINE

## 2024-05-07 NOTE — PROGRESS NOTES
CC:   No chief complaint on file.      HPI: Karly is a 73 y.o. female presents today for her first bilateral knee Synvisc injection for knee osteoarthritis.         Review of Systems   GENERAL: Negative for malaise, significant weight loss, fever  MUSCULOSKELETAL: See HPI  NEURO:  Negative for numbness / tingling     Past Medical History  Past Medical History:   Diagnosis Date    Acute bronchitis due to other specified organisms     Acute bacterial bronchitis    Acute upper respiratory infection, unspecified     URTI (acute upper respiratory infection)    Other abnormal glucose     Abnormal blood sugar    Personal history of diseases of the skin and subcutaneous tissue     History of eczema    Personal history of diseases of the skin and subcutaneous tissue     History of hidradenitis suppurativa    Personal history of other diseases of the circulatory system     History of stenosis of renal artery    Personal history of other diseases of the digestive system     History of gastroesophageal reflux (GERD)    Personal history of other diseases of the nervous system and sense organs     History of carpal tunnel syndrome    Personal history of other diseases of the respiratory system     History of common cold    Personal history of other endocrine, nutritional and metabolic disease     History of hypercholesterolemia    Pruritus, unspecified     Itching of ear    Radiculopathy, cervical region     Acute cervical radiculopathy    Right upper quadrant pain     Right upper quadrant abdominal pain       Medication review  Medication Documentation Review Audit       Reviewed by Jacqueline Mckay, PCNA (Patient Care Technician) on 02/20/24 at 1342      Medication Order Taking? Sig Documenting Provider Last Dose Status   aspirin 81 mg EC tablet [id number]  Take 1 tablet (81 mg) by mouth once daily. Historical Provider, MD  Active   betamethasone valerate (Valisone) 0.1 % cream [id number]  Apply 1 Application topically if needed.  Historical MD Hipolito  Active   calcium carbonate-vitamin D3 500 mg-3.125 mcg (125 unit) tablet tablet [id number]  Take 500 mg by mouth once daily. Isrrael Mcmillan MD  Active   cholecalciferol (Vitamin D-3) 25 MCG (1000 UT) tablet [id number]  Take 1 tablet (25 mcg) by mouth once daily. Isrrael Mcmillan MD  Active   lisinopriL-hydrochlorothiazide 20-12.5 mg tablet [id number]  Take 1 tablet by mouth 2 times a day. Sheryl Ga MD  Active   magnesium oxide (Mag-Ox) 400 mg (241.3 mg magnesium) tablet [id number]  Take 1 tablet (400 mg) by mouth 3 times a day. Historical Provider, MD  Active   nystatin-triamcinolone (Mycolog II) cream [id number]  Apply 1 Application topically if needed. Historical MD Hipolito  Active   pyridoxine (Vitamin B-6) 50 mg tablet [id number]  Take 1 tablet (50 mg) by mouth once daily. Historical Provider, MD  Active   triamcinolone (Kenalog) 0.1 % cream [id number]  Apply 1 Application topically every 12 hours if needed. Historical Provider, MD  Active   vitamin E acid succinate (vitamin E succinate) 67 mg (100 unit) tablet [id number]  Take 1 tablet (67 mg) by mouth once daily. Historical Provider, MD  Active                    Allergies  Allergies   Allergen Reactions    Adhesive Tape-Silicones Other    Amoxicillin Hives    Povidone-Iodine Hives    Statins-Hmg-Coa Reductase Inhibitors Other    Sulfa (Sulfonamide Antibiotics) Other    Nickel Rash       Social History  Social History     Socioeconomic History    Marital status:      Spouse name: Not on file    Number of children: Not on file    Years of education: Not on file    Highest education level: Not on file   Occupational History    Not on file   Tobacco Use    Smoking status: Former     Current packs/day: 0.00     Types: Cigarettes     Quit date:      Years since quittin.3    Smokeless tobacco: Never   Vaping Use    Vaping status: Never Used   Substance and Sexual Activity    Alcohol use: Yes     Comment: Once a  month or less    Drug use: Never    Sexual activity: Defer   Other Topics Concern    Not on file   Social History Narrative    Not on file     Social Determinants of Health     Financial Resource Strain: Low Risk  (5/8/2023)    Received from [facility]    Overall Financial Resource Strain (CARDIA)     Difficulty of Paying Living Expenses: Not hard at all   Food Insecurity: Not on file (5/8/2023)   Transportation Needs: Unknown (5/8/2023)    Received from [facility]    PRAPARE - Transportation     Lack of Transportation (Medical): Not on file     Lack of Transportation (Non-Medical): No   Physical Activity: Not on file   Stress: Not on file   Social Connections: Not on file   Intimate Partner Violence: Not on file   Housing Stability: Unknown (5/8/2023)    Received from [facility]    Housing Stability Vital Sign     Unable to Pay for Housing in the Last Year: Not on file     Number of Places Lived in the Last Year: Not on file     Unstable Housing in the Last Year: No       Surgical History  Past Surgical History:   Procedure Laterality Date    OTHER SURGICAL HISTORY  06/13/2022    Appendectomy    OTHER SURGICAL HISTORY  06/13/2022    Colonoscopy    OTHER SURGICAL HISTORY  06/13/2022    Cholecystectomy    OTHER SURGICAL HISTORY  06/13/2022    Knee surgery       Physical Exam:  GENERAL:  Patient is awake, alert, and oriented to person place and time.  Patient appears well nourished and well kept.  Affect Calm, Not Acutely Distressed.  HEENT:  Normocephalic, Atraumatic, EOMI  CARDIOVASCULAR:  Hemodynamically stable.  RESPIRATORY:  Normal respirations with unlabored breathing.  Extremity: Bilateral knee shows skin is intact.  There is no erythema or warmth.  There is no clinical signs of infection.      Diagnostics: X-rays reviewed        Procedure: L Inj/Asp: bilateral knee on 5/7/2024 2:17 PM  Indications: pain  Details: 22 G needle, lateral  approach  Medications (Right): 2 mL hylan 16 mg/2 mL  Medications (Left): 2 mL hylan 16 mg/2 mL  Outcome: tolerated well, no immediate complications  Procedure, treatment alternatives, risks and benefits explained, specific risks discussed. Consent was given by the patient. Immediately prior to procedure a time out was called to verify the correct patient, procedure, equipment, support staff and site/side marked as required. Patient was prepped and draped in the usual sterile fashion.             Assessment: Bilateral knee osteoarthritis    Plan: Karly presents today for her for first bilateral knee Synvisc injections for knee osteoarthritis. She tolerated her first injections today. She will follow-up for her second Synvisc injections next week.     No orders of the defined types were placed in this encounter.     At the conclusion of the visit there were no further questions by the patient/family regarding their plan of care.  Patient was instructed to call or return with any issues, questions, or concerns regarding their injury and/or treatment plan described above.     05/07/24 at 1:57 PM - Navid Smith MD  Scribe Attestation  By signing my name below, I, Jeovany Contreraskosta, Scribe   attest that this documentation has been prepared under the direction and in the presence of Navid Smith MD.    Office: [phone]    This note was prepared using voice recognition software.  The details of this note are correct and have been reviewed, and corrected to the best of my ability.  Some grammatical errors may persist related to the Dragon software.

## 2024-05-09 ENCOUNTER — APPOINTMENT (OUTPATIENT)
Dept: ORTHOPEDIC SURGERY | Facility: CLINIC | Age: 74
End: 2024-05-09
Payer: MEDICARE

## 2024-05-13 ENCOUNTER — OFFICE VISIT (OUTPATIENT)
Dept: OBGYN CLINIC | Age: 74
End: 2024-05-13
Payer: MEDICARE

## 2024-05-13 VITALS
BODY MASS INDEX: 34.1 KG/M2 | DIASTOLIC BLOOD PRESSURE: 74 MMHG | WEIGHT: 225 LBS | SYSTOLIC BLOOD PRESSURE: 112 MMHG | HEIGHT: 68 IN

## 2024-05-13 DIAGNOSIS — Z01.419 ENCOUNTER FOR WELL WOMAN EXAM WITH ROUTINE GYNECOLOGICAL EXAM: Primary | ICD-10-CM

## 2024-05-13 DIAGNOSIS — Z12.31 SCREENING MAMMOGRAM FOR BREAST CANCER: ICD-10-CM

## 2024-05-13 PROCEDURE — G0101 CA SCREEN;PELVIC/BREAST EXAM: HCPCS | Performed by: OBSTETRICS & GYNECOLOGY

## 2024-05-13 RX ORDER — LISINOPRIL AND HYDROCHLOROTHIAZIDE 20; 12.5 MG/1; MG/1
TABLET ORAL
COMMUNITY
Start: 2024-04-04

## 2024-05-13 RX ORDER — CLOTRIMAZOLE AND BETAMETHASONE DIPROPIONATE 10; .64 MG/G; MG/G
CREAM TOPICAL
Qty: 45 G | Refills: 3 | Status: SHIPPED | OUTPATIENT
Start: 2024-05-13

## 2024-05-13 SDOH — ECONOMIC STABILITY: INCOME INSECURITY: HOW HARD IS IT FOR YOU TO PAY FOR THE VERY BASICS LIKE FOOD, HOUSING, MEDICAL CARE, AND HEATING?: NOT HARD AT ALL

## 2024-05-13 SDOH — ECONOMIC STABILITY: FOOD INSECURITY: WITHIN THE PAST 12 MONTHS, THE FOOD YOU BOUGHT JUST DIDN'T LAST AND YOU DIDN'T HAVE MONEY TO GET MORE.: NEVER TRUE

## 2024-05-13 SDOH — ECONOMIC STABILITY: FOOD INSECURITY: WITHIN THE PAST 12 MONTHS, YOU WORRIED THAT YOUR FOOD WOULD RUN OUT BEFORE YOU GOT MONEY TO BUY MORE.: NEVER TRUE

## 2024-05-13 ASSESSMENT — VISUAL ACUITY: OU: 1

## 2024-05-13 ASSESSMENT — PATIENT HEALTH QUESTIONNAIRE - PHQ9
SUM OF ALL RESPONSES TO PHQ QUESTIONS 1-9: 0
1. LITTLE INTEREST OR PLEASURE IN DOING THINGS: NOT AT ALL
2. FEELING DOWN, DEPRESSED OR HOPELESS: NOT AT ALL
SUM OF ALL RESPONSES TO PHQ QUESTIONS 1-9: 0
SUM OF ALL RESPONSES TO PHQ9 QUESTIONS 1 & 2: 0

## 2024-05-13 ASSESSMENT — ENCOUNTER SYMPTOMS
RESPIRATORY NEGATIVE: 1
VOMITING: 0
RECTAL PAIN: 0
ABDOMINAL PAIN: 0
EYES NEGATIVE: 1
BLOOD IN STOOL: 0
ABDOMINAL DISTENTION: 0
DIARRHEA: 0
NAUSEA: 0

## 2024-05-13 NOTE — PROGRESS NOTES
The patient was asked if she would like a chaperone present for her intimate exam. She  Declined the chaperone. Neftali Morales CMA (AAMA)

## 2024-05-13 NOTE — PROGRESS NOTES
Subjective:      Patient ID: Dania Watt is a 73 y.o. female    Annual exam. Reviewed medical, surgical, social and family history.  Also reviewed current medications and allergies.  No PMB.  No GYN complaints.  Pap deferred.   Screening mammogram ordered.  Monthly SBE encouraged.  Dexa scan ordered per protocol.  Screening colonoscopy recommended per routine.  F/U annual exam or prn.    Vitals:  There were no vitals taken for this visit.  Past Medical History:   Diagnosis Date    Hypertension      Past Surgical History:   Procedure Laterality Date    COLONOSCOPY  02/20/2018    TUBAL LIGATION       Allergies:  Betadine [povidone iodine], Sulfa antibiotics, and Nickel  Current Outpatient Medications   Medication Sig Dispense Refill    nystatin-triamcinolone (MYCOLOG II) 196904-6.1 UNIT/GM-% cream APPLY TOPICALLY TO THE AFFECTED AREA TWICE DAILY 30 g 3    magnesium oxide (MAG-OX) 400 MG tablet Take by mouth      nystatin [id number] UNIT/GM powder APPLY THREE TIMES DAILY 30 g 0    Pyridoxine HCl (VITAMIN B-6 PO) Take by mouth      vitamin E 1000 UNITS capsule Take 1 capsule by mouth daily      lisinopril-hydrochlorothiazide (PRINZIDE;ZESTORETIC) 10-12.5 MG per tablet       vitamin D (CHOLECALCIFEROL) 1000 UNIT TABS tablet Take 1 tablet by mouth daily      Calcium Carbonate-Vit D-Min (CALCIUM 1200 PO) Take by mouth      aspirin 81 MG tablet Take 1 tablet by mouth daily       No current facility-administered medications for this visit.     Social History     Socioeconomic History    Marital status:      Spouse name: Not on file    Number of children: Not on file    Years of education: Not on file    Highest education level: Not on file   Occupational History    Not on file   Tobacco Use    Smoking status: Former    Smokeless tobacco: Never   Substance and Sexual Activity    Alcohol use: Yes     Alcohol/week: 0.0 standard drinks of alcohol    Drug use: No    Sexual activity: Not Currently   Other Topics Concern

## 2024-05-14 ENCOUNTER — OFFICE VISIT (OUTPATIENT)
Dept: ORTHOPEDIC SURGERY | Facility: CLINIC | Age: 74
End: 2024-05-14
Payer: MEDICARE

## 2024-05-14 DIAGNOSIS — M17.0 PRIMARY OSTEOARTHRITIS OF BOTH KNEES: Primary | ICD-10-CM

## 2024-05-14 PROCEDURE — 1159F MED LIST DOCD IN RCRD: CPT | Performed by: INTERNAL MEDICINE

## 2024-05-14 PROCEDURE — 20610 DRAIN/INJ JOINT/BURSA W/O US: CPT | Performed by: INTERNAL MEDICINE

## 2024-05-14 PROCEDURE — 1160F RVW MEDS BY RX/DR IN RCRD: CPT | Performed by: INTERNAL MEDICINE

## 2024-05-14 NOTE — PROGRESS NOTES
CC:   No chief complaint on file.      HPI: Karly is a 73 y.o. female presents today for her second bilateral knee Synvisc injection for knee osteoarthritis.         Review of Systems   GENERAL: Negative for malaise, significant weight loss, fever  MUSCULOSKELETAL: See HPI  NEURO:  Negative for numbness / tingling     Past Medical History  Past Medical History:   Diagnosis Date    Acute bronchitis due to other specified organisms     Acute bacterial bronchitis    Acute upper respiratory infection, unspecified     URTI (acute upper respiratory infection)    Other abnormal glucose     Abnormal blood sugar    Personal history of diseases of the skin and subcutaneous tissue     History of eczema    Personal history of diseases of the skin and subcutaneous tissue     History of hidradenitis suppurativa    Personal history of other diseases of the circulatory system     History of stenosis of renal artery    Personal history of other diseases of the digestive system     History of gastroesophageal reflux (GERD)    Personal history of other diseases of the nervous system and sense organs     History of carpal tunnel syndrome    Personal history of other diseases of the respiratory system     History of common cold    Personal history of other endocrine, nutritional and metabolic disease     History of hypercholesterolemia    Pruritus, unspecified     Itching of ear    Radiculopathy, cervical region     Acute cervical radiculopathy    Right upper quadrant pain     Right upper quadrant abdominal pain       Medication review  Medication Documentation Review Audit       Reviewed by Jacqueline Mckay, PCNA (Patient Care Technician) on 02/20/24 at 1342      Medication Order Taking? Sig Documenting Provider Last Dose Status   aspirin 81 mg EC tablet [id number]  Take 1 tablet (81 mg) by mouth once daily. Historical Provider, MD  Active   betamethasone valerate (Valisone) 0.1 % cream [id number]  Apply 1 Application topically if  needed. Historical Provider, MD  Active   calcium carbonate-vitamin D3 500 mg-3.125 mcg (125 unit) tablet tablet [id number]  Take 500 mg by mouth once daily. Isrrael Mcmillan MD  Active   cholecalciferol (Vitamin D-3) 25 MCG (1000 UT) tablet [id number]  Take 1 tablet (25 mcg) by mouth once daily. Isrrael Mcmillan MD  Active   lisinopriL-hydrochlorothiazide 20-12.5 mg tablet [id number]  Take 1 tablet by mouth 2 times a day. Sheryl Ga MD  Active   magnesium oxide (Mag-Ox) 400 mg (241.3 mg magnesium) tablet [id number]  Take 1 tablet (400 mg) by mouth 3 times a day. Historical Provider, MD  Active   nystatin-triamcinolone (Mycolog II) cream [id number]  Apply 1 Application topically if needed. Historical MD Hipolito  Active   pyridoxine (Vitamin B-6) 50 mg tablet [id number]  Take 1 tablet (50 mg) by mouth once daily. Historical Provider, MD  Active   triamcinolone (Kenalog) 0.1 % cream [id number]  Apply 1 Application topically every 12 hours if needed. Historical Provider, MD  Active   vitamin E acid succinate (vitamin E succinate) 67 mg (100 unit) tablet [id number]  Take 1 tablet (67 mg) by mouth once daily. Historical Provider, MD  Active                    Allergies  Allergies   Allergen Reactions    Adhesive Tape-Silicones Other    Amoxicillin Hives    Povidone-Iodine Hives    Statins-Hmg-Coa Reductase Inhibitors Other    Sulfa (Sulfonamide Antibiotics) Other    Nickel Rash       Social History  Social History     Socioeconomic History    Marital status:      Spouse name: Not on file    Number of children: Not on file    Years of education: Not on file    Highest education level: Not on file   Occupational History    Not on file   Tobacco Use    Smoking status: Former     Current packs/day: 0.00     Types: Cigarettes     Quit date:      Years since quittin.3    Smokeless tobacco: Never   Vaping Use    Vaping status: Never Used   Substance and Sexual Activity    Alcohol use: Yes     Comment:  Once a month or less    Drug use: Never    Sexual activity: Defer   Other Topics Concern    Not on file   Social History Narrative    Not on file     Social Determinants of Health     Financial Resource Strain: Low Risk  (5/13/2024)    Received from [facility]    Overall Financial Resource Strain (CARDIA)     Difficulty of Paying Living Expenses: Not hard at all   Food Insecurity: No Food Insecurity (5/13/2024)    Received from [facility]    Hunger Vital Sign     Worried About Running Out of Food in the Last Year: Never true     Ran Out of Food in the Last Year: Never true   Transportation Needs: Unknown (5/13/2024)    Received from [facility]    PRAPARE - Transportation     Lack of Transportation (Medical): Not on file     Lack of Transportation (Non-Medical): No   Physical Activity: Not on file   Stress: Not on file   Social Connections: Not on file   Intimate Partner Violence: Not on file   Housing Stability: Unknown (5/13/2024)    Received from [facility]    Housing Stability Vital Sign     Unable to Pay for Housing in the Last Year: Not on file     Number of Places Lived in the Last Year: Not on file     Unstable Housing in the Last Year: No       Surgical History  Past Surgical History:   Procedure Laterality Date    OTHER SURGICAL HISTORY  06/13/2022    Appendectomy    OTHER SURGICAL HISTORY  06/13/2022    Colonoscopy    OTHER SURGICAL HISTORY  06/13/2022    Cholecystectomy    OTHER SURGICAL HISTORY  06/13/2022    Knee surgery       Physical Exam:  GENERAL:  Patient is awake, alert, and oriented to person place and time.  Patient appears well nourished and well kept.  Affect Calm, Not Acutely Distressed.  HEENT:  Normocephalic, Atraumatic, EOMI  CARDIOVASCULAR:  Hemodynamically stable.  RESPIRATORY:  Normal respirations with unlabored breathing.  Extremity: Bilateral knee shows skin is intact. There is no erythema or  warmth. There is no clinical signs of infection.       Diagnostics: None today  ECG 12 lead (Clinic Performed)  See scan        Procedure: L Inj/Asp: bilateral knee on 5/14/2024 2:27 PM  Indications: pain  Details: 22 G needle, lateral approach  Medications (Right): 2 mL hylan 16 mg/2 mL  Medications (Left): 2 mL hylan 16 mg/2 mL  Outcome: tolerated well, no immediate complications  Procedure, treatment alternatives, risks and benefits explained, specific risks discussed. Consent was given by the patient. Immediately prior to procedure a time out was called to verify the correct patient, procedure, equipment, support staff and site/side marked as required. Patient was prepped and draped in the usual sterile fashion.             Assessment: Bilateral knee osteoarthritis     Plan: Karly presents today for her for second bilateral knee Synvisc injections for knee osteoarthritis. She tolerated her second injections today. She will follow-up for her third Synvisc injections next week     No orders of the defined types were placed in this encounter.     At the conclusion of the visit there were no further questions by the patient/family regarding their plan of care.  Patient was instructed to call or return with any issues, questions, or concerns regarding their injury and/or treatment plan described above.     05/14/24 at 2:01 PM - Navid Smith MD  Scribe Attestation  By signing my name below, I, Jeovany Wrightmo, Scribe   attest that this documentation has been prepared under the direction and in the presence of Navid Smith MD.    Office: [phone]    This note was prepared using voice recognition software.  The details of this note are correct and have been reviewed, and corrected to the best of my ability.  Some grammatical errors may persist related to the Dragon software.

## 2024-05-16 ENCOUNTER — APPOINTMENT (OUTPATIENT)
Dept: ORTHOPEDIC SURGERY | Facility: CLINIC | Age: 74
End: 2024-05-16
Payer: MEDICARE

## 2024-05-21 ENCOUNTER — OFFICE VISIT (OUTPATIENT)
Dept: ORTHOPEDIC SURGERY | Facility: CLINIC | Age: 74
End: 2024-05-21
Payer: MEDICARE

## 2024-05-21 DIAGNOSIS — M17.0 PRIMARY OSTEOARTHRITIS OF BOTH KNEES: ICD-10-CM

## 2024-05-21 PROCEDURE — 1160F RVW MEDS BY RX/DR IN RCRD: CPT | Performed by: INTERNAL MEDICINE

## 2024-05-21 PROCEDURE — 1159F MED LIST DOCD IN RCRD: CPT | Performed by: INTERNAL MEDICINE

## 2024-05-21 PROCEDURE — 20610 DRAIN/INJ JOINT/BURSA W/O US: CPT | Performed by: INTERNAL MEDICINE

## 2024-05-21 NOTE — PROGRESS NOTES
CC:   Chief Complaint   Patient presents with    Left Knee - Injections     Pt presents today for synvisc#3 inj to B/L knee      Right Knee - Injections       HPI: Karly is a 73 y.o. female  presents today for her third bilateral knee Synvisc injection for knee osteoarthritis.         Review of Systems   GENERAL: Negative for malaise, significant weight loss, fever  MUSCULOSKELETAL: See HPI  NEURO:  Negative for numbness / tingling     Past Medical History  Past Medical History:   Diagnosis Date    Acute bronchitis due to other specified organisms     Acute bacterial bronchitis    Acute upper respiratory infection, unspecified     URTI (acute upper respiratory infection)    Other abnormal glucose     Abnormal blood sugar    Personal history of diseases of the skin and subcutaneous tissue     History of eczema    Personal history of diseases of the skin and subcutaneous tissue     History of hidradenitis suppurativa    Personal history of other diseases of the circulatory system     History of stenosis of renal artery    Personal history of other diseases of the digestive system     History of gastroesophageal reflux (GERD)    Personal history of other diseases of the nervous system and sense organs     History of carpal tunnel syndrome    Personal history of other diseases of the respiratory system     History of common cold    Personal history of other endocrine, nutritional and metabolic disease     History of hypercholesterolemia    Pruritus, unspecified     Itching of ear    Radiculopathy, cervical region     Acute cervical radiculopathy    Right upper quadrant pain     Right upper quadrant abdominal pain       Medication review  Medication Documentation Review Audit       Reviewed by MATTEO Buckley (Patient Care Technician) on 02/20/24 at 1342      Medication Order Taking? Sig Documenting Provider Last Dose Status   aspirin 81 mg EC tablet [id number]  Take 1 tablet (81 mg) by mouth once daily.  Historical MD Hipolito  Active   betamethasone valerate (Valisone) 0.1 % cream [id number]  Apply 1 Application topically if needed. Historical MD Hipolito  Active   calcium carbonate-vitamin D3 500 mg-3.125 mcg (125 unit) tablet tablet [id number]  Take 500 mg by mouth once daily. Isrrael Mcmillan MD  Active   cholecalciferol (Vitamin D-3) 25 MCG (1000 UT) tablet [id number]  Take 1 tablet (25 mcg) by mouth once daily. Isrrael Mcmillan MD  Active   lisinopriL-hydrochlorothiazide 20-12.5 mg tablet [id number]  Take 1 tablet by mouth 2 times a day. Sheryl Ga MD  Active   magnesium oxide (Mag-Ox) 400 mg (241.3 mg magnesium) tablet [id number]  Take 1 tablet (400 mg) by mouth 3 times a day. Isrrael Mcmillan MD  Active   nystatin-triamcinolone (Mycolog II) cream [id number]  Apply 1 Application topically if needed. Isrrael Mcmillan MD  Active   pyridoxine (Vitamin B-6) 50 mg tablet [id number]  Take 1 tablet (50 mg) by mouth once daily. Historical Provider, MD  Active   triamcinolone (Kenalog) 0.1 % cream [id number]  Apply 1 Application topically every 12 hours if needed. Historical MD Hipolito  Active   vitamin E acid succinate (vitamin E succinate) 67 mg (100 unit) tablet [id number]  Take 1 tablet (67 mg) by mouth once daily. Historical Provider, MD  Active                    Allergies  Allergies   Allergen Reactions    Adhesive Tape-Silicones Other    Amoxicillin Hives    Povidone-Iodine Hives    Statins-Hmg-Coa Reductase Inhibitors Other    Sulfa (Sulfonamide Antibiotics) Other    Nickel Rash       Social History  Social History     Socioeconomic History    Marital status:      Spouse name: Not on file    Number of children: Not on file    Years of education: Not on file    Highest education level: Not on file   Occupational History    Not on file   Tobacco Use    Smoking status: Former     Current packs/day: 0.00     Types: Cigarettes     Quit date:      Years since quittin.4    Smokeless  tobacco: Never   Vaping Use    Vaping status: Never Used   Substance and Sexual Activity    Alcohol use: Yes     Comment: Once a month or less    Drug use: Never    Sexual activity: Defer   Other Topics Concern    Not on file   Social History Narrative    Not on file     Social Determinants of Health     Financial Resource Strain: Low Risk  (5/13/2024)    Received from [facility]    Overall Financial Resource Strain (CARDIA)     Difficulty of Paying Living Expenses: Not hard at all   Food Insecurity: No Food Insecurity (5/13/2024)    Received from [facility]    Hunger Vital Sign     Worried About Running Out of Food in the Last Year: Never true     Ran Out of Food in the Last Year: Never true   Transportation Needs: Unknown (5/13/2024)    Received from [facility]    PRAPARE - Transportation     Lack of Transportation (Medical): Not on file     Lack of Transportation (Non-Medical): No   Physical Activity: Not on file   Stress: Not on file   Social Connections: Not on file   Intimate Partner Violence: Not on file   Housing Stability: Unknown (5/13/2024)    Received from [facility]    Housing Stability Vital Sign     Unable to Pay for Housing in the Last Year: Not on file     Number of Places Lived in the Last Year: Not on file     Unstable Housing in the Last Year: No       Surgical History  Past Surgical History:   Procedure Laterality Date    OTHER SURGICAL HISTORY  06/13/2022    Appendectomy    OTHER SURGICAL HISTORY  06/13/2022    Colonoscopy    OTHER SURGICAL HISTORY  06/13/2022    Cholecystectomy    OTHER SURGICAL HISTORY  06/13/2022    Knee surgery       Physical Exam:  GENERAL:  Patient is awake, alert, and oriented to person place and time.  Patient appears well nourished and well kept.  Affect Calm, Not Acutely Distressed.  HEENT:  Normocephalic, Atraumatic, EOMI  CARDIOVASCULAR:  Hemodynamically stable.  RESPIRATORY:   Normal respirations with unlabored breathing.  Extremity: Bilateral knee shows skin is intact. There is no erythema or warmth. There is no clinical signs of infection.       Diagnostics: None today  ECG 12 lead (Clinic Performed)  See scan        Procedure: L Inj/Asp: bilateral knee on 5/21/2024 4:07 PM  Indications: pain  Details: 22 G needle, lateral approach  Medications (Right): 2 mL hylan 16 mg/2 mL  Medications (Left): 2 mL hylan 16 mg/2 mL  Outcome: tolerated well, no immediate complications  Procedure, treatment alternatives, risks and benefits explained, specific risks discussed. Consent was given by the patient. Immediately prior to procedure a time out was called to verify the correct patient, procedure, equipment, support staff and site/side marked as required. Patient was prepped and draped in the usual sterile fashion.             Assessment: Bilateral knee osteoarthritis     Plan: Karly  presents today for her third bilateral knee Synvisc injections for knee osteoarthritis. She tolerated her third injections today.  She states there is minimal relief from the gel injections.  If there is no improvement she will follow-up with my partner to discuss knee replacement options.    Orders Placed This Encounter    hylan (Synvisc) injection 16 mg    hylan (Synvisc) injection 16 mg      At the conclusion of the visit there were no further questions by the patient/family regarding their plan of care.  Patient was instructed to call or return with any issues, questions, or concerns regarding their injury and/or treatment plan described above.     05/21/24 at 1:59 PM - Navid Smith MD  Scribe Attestation  By signing my name below, I Jeovany Uriel, Scribabelardo   attest that this documentation has been prepared under the direction and in the presence of Navid Smith MD.    Office: [phone]    This note was prepared using voice recognition software.  The details of this note are correct and have been  reviewed, and corrected to the best of my ability.  Some grammatical errors may persist related to the Dragon software.

## 2024-05-28 ENCOUNTER — HOSPITAL ENCOUNTER (OUTPATIENT)
Dept: WOMENS IMAGING | Age: 74
Discharge: HOME | End: 2024-05-30
Payer: MEDICARE

## 2024-05-28 VITALS — BODY MASS INDEX: 34.22 KG/M2 | HEIGHT: 68 IN

## 2024-05-28 DIAGNOSIS — Z12.31 SCREENING MAMMOGRAM FOR BREAST CANCER: ICD-10-CM

## 2024-05-28 PROCEDURE — 77063 BREAST TOMOSYNTHESIS BI: CPT

## 2024-06-20 ENCOUNTER — APPOINTMENT (OUTPATIENT)
Dept: PRIMARY CARE | Facility: CLINIC | Age: 74
End: 2024-06-20
Payer: MEDICARE

## 2024-06-20 VITALS
TEMPERATURE: 97.9 F | HEART RATE: 68 BPM | DIASTOLIC BLOOD PRESSURE: 68 MMHG | BODY MASS INDEX: 34.87 KG/M2 | WEIGHT: 226 LBS | SYSTOLIC BLOOD PRESSURE: 120 MMHG

## 2024-06-20 DIAGNOSIS — I25.10 ASHD (ARTERIOSCLEROTIC HEART DISEASE): Primary | ICD-10-CM

## 2024-06-20 DIAGNOSIS — I10 PRIMARY HYPERTENSION: ICD-10-CM

## 2024-06-20 DIAGNOSIS — E78.2 MIXED HYPERLIPIDEMIA: ICD-10-CM

## 2024-06-20 PROCEDURE — 1036F TOBACCO NON-USER: CPT | Performed by: INTERNAL MEDICINE

## 2024-06-20 PROCEDURE — 3078F DIAST BP <80 MM HG: CPT | Performed by: INTERNAL MEDICINE

## 2024-06-20 PROCEDURE — 1160F RVW MEDS BY RX/DR IN RCRD: CPT | Performed by: INTERNAL MEDICINE

## 2024-06-20 PROCEDURE — 1159F MED LIST DOCD IN RCRD: CPT | Performed by: INTERNAL MEDICINE

## 2024-06-20 PROCEDURE — 99213 OFFICE O/P EST LOW 20 MIN: CPT | Performed by: INTERNAL MEDICINE

## 2024-06-20 PROCEDURE — 3074F SYST BP LT 130 MM HG: CPT | Performed by: INTERNAL MEDICINE

## 2024-06-20 ASSESSMENT — ENCOUNTER SYMPTOMS
RESPIRATORY NEGATIVE: 1
CARDIOVASCULAR NEGATIVE: 1

## 2024-06-20 ASSESSMENT — PATIENT HEALTH QUESTIONNAIRE - PHQ9
2. FEELING DOWN, DEPRESSED OR HOPELESS: NOT AT ALL
SUM OF ALL RESPONSES TO PHQ9 QUESTIONS 1 & 2: 0
1. LITTLE INTEREST OR PLEASURE IN DOING THINGS: NOT AT ALL

## 2024-06-20 NOTE — PROGRESS NOTES
Subjective   Patient ID: Karly Milton is a 73 y.o. female who presents for Follow-up (Pt here for office visit no labs for this visit).    HPI patient here for office visit she has history of CAD paroxysmal A-fib and PVCs.  She sees EP.  History of mild CAD history of PVCs and paroxysmal A-fib continue with low-dose aspirin.  Continue lisinopril hydrochlorothiazide for hypertension.  Blood work ordered follow-up with us in 4 months patient does have peripheral neuropathy she was advised to take alpha lipoic acid and cut back on pyridoxine    Review of Systems   HENT: Negative.     Respiratory: Negative.     Cardiovascular: Negative.    Musculoskeletal:         Knee arthritis   Neurological:         Peripheral neuropathy   All other systems reviewed and are negative.      Objective   /68   Pulse 68   Temp 36.6 °C (97.9 °F) (Temporal)   Wt 103 kg (226 lb)   BMI 34.87 kg/m²     Physical Exam  Vitals reviewed.   Constitutional:       Appearance: Normal appearance.   HENT:      Head: Normocephalic and atraumatic.   Eyes:      Pupils: Pupils are equal, round, and reactive to light.   Cardiovascular:      Rate and Rhythm: Normal rate and regular rhythm.   Pulmonary:      Effort: Pulmonary effort is normal.      Breath sounds: Normal breath sounds.   Musculoskeletal:      Right lower leg: No edema.      Left lower leg: No edema.   Neurological:      General: No focal deficit present.      Mental Status: She is alert and oriented to person, place, and time.   Psychiatric:         Mood and Affect: Mood normal.         Behavior: Behavior normal.       Assessment/Plan   Problem List Items Addressed This Visit             ICD-10-CM    Primary hypertension I10    ASHD (arteriosclerotic heart disease) - Primary I25.10    Hyperlipidemia E78.5   Patient is getting gel injections for knee arthritis she will eventually need knee replacement.  Continue lisinopril hydrochlorothiazide for hypertension continue low-dose aspirin  with history of paroxysmal A-fib and mild CAD with cath proven diagnosis about 17 years ago.

## 2024-07-03 RX ORDER — NYSTATIN 100000 [USP'U]/G
POWDER TOPICAL
Qty: 30 G | Refills: 0 | Status: SHIPPED | OUTPATIENT
Start: 2024-07-03

## 2024-09-26 DIAGNOSIS — I10 PRIMARY HYPERTENSION: ICD-10-CM

## 2024-09-26 RX ORDER — LISINOPRIL AND HYDROCHLOROTHIAZIDE 12.5; 2 MG/1; MG/1
TABLET ORAL
Qty: 135 TABLET | Refills: 1 | Status: SHIPPED | OUTPATIENT
Start: 2024-09-26

## 2024-11-13 ENCOUNTER — HOSPITAL ENCOUNTER (OUTPATIENT)
Dept: RADIOLOGY | Facility: CLINIC | Age: 74
Discharge: HOME | End: 2024-11-13
Payer: MEDICARE

## 2024-11-13 ENCOUNTER — OFFICE VISIT (OUTPATIENT)
Dept: ORTHOPEDIC SURGERY | Facility: CLINIC | Age: 74
End: 2024-11-13
Payer: MEDICARE

## 2024-11-13 DIAGNOSIS — M17.11 PRIMARY OSTEOARTHRITIS OF RIGHT KNEE: ICD-10-CM

## 2024-11-13 DIAGNOSIS — M17.11 PRIMARY OSTEOARTHRITIS OF RIGHT KNEE: Primary | ICD-10-CM

## 2024-11-13 PROCEDURE — 73564 X-RAY EXAM KNEE 4 OR MORE: CPT | Mod: RT

## 2024-11-13 PROCEDURE — 99214 OFFICE O/P EST MOD 30 MIN: CPT | Performed by: ORTHOPAEDIC SURGERY

## 2024-11-13 PROCEDURE — 1160F RVW MEDS BY RX/DR IN RCRD: CPT | Performed by: ORTHOPAEDIC SURGERY

## 2024-11-13 PROCEDURE — 73564 X-RAY EXAM KNEE 4 OR MORE: CPT | Mod: RIGHT SIDE | Performed by: ORTHOPAEDIC SURGERY

## 2024-11-13 PROCEDURE — 1159F MED LIST DOCD IN RCRD: CPT | Performed by: ORTHOPAEDIC SURGERY

## 2024-11-13 PROCEDURE — 1036F TOBACCO NON-USER: CPT | Performed by: ORTHOPAEDIC SURGERY

## 2024-11-14 NOTE — PROGRESS NOTES
74-year-old female here for evaluation right-sided knee pain.  She states has been having trouble with her knee for many years but is been managed with cortisone and gel injections she had her most recent gel injection in May but now she is having severe impairment of bodily function related to her right knee and she is interested in surgical consultation she recently returned from a cruise    Location of pain: Anterior and medial aspect knee right  Quality of pain: Chronic pain for many years getting progressively worse  Modifying factors: Worse with weightbearing better with rest  Associated signs and symptoms: Noticing swelling and stiffness in her knee popping clicking and grinding  Previous treatment: She has a history of a knee arthroscopy years ago she said cortisone and gel injection as well as anti-inflammatory medication        The patient's past medical history, family history, social history, and review of systems were documented on the patient's medical intake form.  The medical intake form was reviewed and scanned into the electronic medical record for future use.  History is otherwise negative except as stated in the HPI.    Physical exam    General: Alert and oriented to place, person, and time.  No acute distress and breathing comfortably; pleasant and cooperative with the examination.  HEENT: Head is normocephalic and atraumatic.  Neck: Supple, no visible swelling.  Cardiovascular: Good perfusion to the affected extremity.  Lungs: No audible wheezing or labored breathing.  Abdomen: Nondistended  HEME/Lymph : No visible abnormalities bilateral lower extremity    Extremity:  The affected knee was examined and inspected and was tender to touch along the medial aspect.  The patient has catching/locking and occasional mechanical symptoms.  The skin was intact without breakdown or open wounds.  There was a mild Hussein exam seen with mild evidence of instability and weakness in the collateral  ligaments with laxity to varus valgus stress and in the anterior posterior plane.  There was a negative Lachman's test, pivot shift test, and posterior drawer sign.  There was no foot drop, numbness or tingling.  Sensation, reflexes, and pulses in the foot and ankle were present.  There was an effusion but range of motion was good and straight leg raise testing was normal.   The patient had the ability to bear weight but with discomfort.  The patient's gait was antalgic secondary to the discomfort. Knee range of motion was 5-115    Diagnostics:      XR knee right 4+ views    Result Date: 11/13/2024  Interpreted By:  Jasmeet Mon, STUDY: XR KNEE RIGHT 4+ VIEWS; 11/13/2024 8:33 am   INDICATION: Signs/Symptoms:pain.   ACCESSION NUMBER(S): [id number]   ORDERING CLINICIAN: JASMEET MON   FINDINGS: Right knee weightbearing four views. Severe knee arthritis with bone-on-bone articulation large osteophyte formation no evidence of fracture dislocation or other bony abnormality     Signed by: Jasmeet Mon 11/13/2024 9:12 AM Dictation workstation:   [id number]         Procedures  [none   ]    Assessment:  Osteoarthritis knee right    Treatment plan:  1.  The natural history of the condition and its associated treatment alternatives including surgical and nonsurgical options were discussed with the patient at length.  2.  Patient with significant impairment of bodily function related to her right knee arthritis we discussed surgical nonsurgical options she like to proceed with right total knee replacement.  The procedures risk benefits treatment alternatives and postoperative course were discussed with her she understands and wishes to proceed.  3.  Patient has failed all forms of conservative treatment.  The joint pain is significantly affecting quality of life and activities of daily living.    The patient has pain in the joint that is increased with activity and weightbearing, and walks with an antalgic  gait.  The pain is interfering with activities of daily living.  Patient has limited range of motion and pain with passive range of motion.  X-rays demonstrate joint space narrowing, subchondral sclerosis and osteophyte formation.  Symptoms are not improving with medication, physical therapy or supportive device for a period of at least 3 months.  Weight loss and smoking cessation have been discussed with the patient.  Patient advised on when to cease smoking 6-8 weeks before the procedure.      Patient would like to go and schedule joint replacement surgery.  The procedure its risks, benefits, and treatment alternatives were discussed at length and patient would like to proceed.  Patient is going to schedule the procedure at their earliest convenience and see me back for a preop visit just prior.  Preadmission testing, medical checkup, and insurance authorization will be performed in the meantime.  Patient understands a joint replacement surgery is a last resort, although a very successful operation results are not guaranteed.  4.  All of the patient's questions were answered.    Orders Placed This Encounter    XR knee right 4+ views       This note was prepared using voice recognition software.  The details of this note are correct and have been reviewed, and corrected to the best of my ability.  Some grammatical areas may persist related to the Dragon software    Miles Mon MD  Senior Attending Physician  [facility]  [facility]    [phone]

## 2024-11-19 PROBLEM — M17.11 UNILATERAL PRIMARY OSTEOARTHRITIS, RIGHT KNEE: Status: ACTIVE | Noted: 2024-11-18

## 2024-12-10 ENCOUNTER — APPOINTMENT (OUTPATIENT)
Dept: PRIMARY CARE | Facility: CLINIC | Age: 74
End: 2024-12-10
Payer: MEDICARE

## 2024-12-10 VITALS
SYSTOLIC BLOOD PRESSURE: 126 MMHG | BODY MASS INDEX: 35.95 KG/M2 | WEIGHT: 233 LBS | HEART RATE: 64 BPM | DIASTOLIC BLOOD PRESSURE: 70 MMHG | TEMPERATURE: 97.6 F

## 2024-12-10 DIAGNOSIS — I10 PRIMARY HYPERTENSION: Primary | ICD-10-CM

## 2024-12-10 DIAGNOSIS — M17.11 UNILATERAL PRIMARY OSTEOARTHRITIS, RIGHT KNEE: ICD-10-CM

## 2024-12-10 DIAGNOSIS — R09.89 BILATERAL CAROTID BRUITS: ICD-10-CM

## 2024-12-10 DIAGNOSIS — I25.10 ASHD (ARTERIOSCLEROTIC HEART DISEASE): ICD-10-CM

## 2024-12-10 PROCEDURE — 3078F DIAST BP <80 MM HG: CPT | Performed by: INTERNAL MEDICINE

## 2024-12-10 PROCEDURE — 3074F SYST BP LT 130 MM HG: CPT | Performed by: INTERNAL MEDICINE

## 2024-12-10 PROCEDURE — 1036F TOBACCO NON-USER: CPT | Performed by: INTERNAL MEDICINE

## 2024-12-10 PROCEDURE — 1158F ADVNC CARE PLAN TLK DOCD: CPT | Performed by: INTERNAL MEDICINE

## 2024-12-10 PROCEDURE — G2211 COMPLEX E/M VISIT ADD ON: HCPCS | Performed by: INTERNAL MEDICINE

## 2024-12-10 PROCEDURE — 1159F MED LIST DOCD IN RCRD: CPT | Performed by: INTERNAL MEDICINE

## 2024-12-10 PROCEDURE — 1123F ACP DISCUSS/DSCN MKR DOCD: CPT | Performed by: INTERNAL MEDICINE

## 2024-12-10 PROCEDURE — 99214 OFFICE O/P EST MOD 30 MIN: CPT | Performed by: INTERNAL MEDICINE

## 2024-12-10 PROCEDURE — 1160F RVW MEDS BY RX/DR IN RCRD: CPT | Performed by: INTERNAL MEDICINE

## 2024-12-10 ASSESSMENT — ENCOUNTER SYMPTOMS
RESPIRATORY NEGATIVE: 1
CONSTITUTIONAL NEGATIVE: 1
ENDOCRINE NEGATIVE: 1
EYES NEGATIVE: 1
CARDIOVASCULAR NEGATIVE: 1
NEUROLOGICAL NEGATIVE: 1
GASTROINTESTINAL NEGATIVE: 1
PSYCHIATRIC NEGATIVE: 1
HEMATOLOGIC/LYMPHATIC NEGATIVE: 1

## 2024-12-10 NOTE — PROGRESS NOTES
Subjective   Patient ID: Karly Milton is a 74 y.o. female who presents for Follow-up (Pt here follow up no labs ).    HPI patient here for follow-up she will be going for knee replacement in February she has a history of mild CAD she had a cath in 2007 she is doing well she has hypertension.  No history of diabetes or CHF    Review of Systems   Constitutional: Negative.    HENT: Negative.     Eyes: Negative.    Respiratory: Negative.     Cardiovascular: Negative.    Gastrointestinal: Negative.    Endocrine: Negative.    Genitourinary: Negative.    Musculoskeletal:         Arthritis Knees   Skin: Negative.    Neurological: Negative.    Hematological: Negative.    Psychiatric/Behavioral: Negative.     All other systems reviewed and are negative.      Objective   /70   Pulse 64   Temp 36.4 °C (97.6 °F) (Temporal)   Wt 106 kg (233 lb)   BMI 35.95 kg/m²     Physical Exam  Vitals reviewed.   Constitutional:       Appearance: Normal appearance.   HENT:      Head: Normocephalic and atraumatic.   Cardiovascular:      Rate and Rhythm: Normal rate and regular rhythm.   Pulmonary:      Effort: Pulmonary effort is normal.      Breath sounds: Normal breath sounds.   Abdominal:      Palpations: Abdomen is soft.   Musculoskeletal:      Right lower leg: No edema.      Left lower leg: No edema.   Neurological:      General: No focal deficit present.      Mental Status: She is alert and oriented to person, place, and time.       Assessment/Plan   Problem List Items Addressed This Visit             ICD-10-CM    Primary hypertension - Primary I10    ASHD (arteriosclerotic heart disease) I25.10    Unilateral primary osteoarthritis, right knee M17.11   Patient will be going for knee replacement in February CAD is stable she will continue low-dose aspirin with a history of palpitations and PVC and PAF.  Continue lisinopril hydrochlorothiazide for hypertension.  Advised Flu shot.

## 2025-01-31 ENCOUNTER — TELEPHONE (OUTPATIENT)
Dept: PRIMARY CARE | Facility: CLINIC | Age: 75
End: 2025-01-31
Payer: MEDICARE

## 2025-02-03 ENCOUNTER — APPOINTMENT (OUTPATIENT)
Dept: PRIMARY CARE | Facility: CLINIC | Age: 75
End: 2025-02-03
Payer: MEDICARE

## 2025-02-03 VITALS
TEMPERATURE: 97.8 F | BODY MASS INDEX: 36.11 KG/M2 | WEIGHT: 234 LBS | DIASTOLIC BLOOD PRESSURE: 72 MMHG | HEART RATE: 64 BPM | SYSTOLIC BLOOD PRESSURE: 124 MMHG

## 2025-02-03 DIAGNOSIS — I25.10 ASHD (ARTERIOSCLEROTIC HEART DISEASE): ICD-10-CM

## 2025-02-03 DIAGNOSIS — E78.2 MIXED HYPERLIPIDEMIA: ICD-10-CM

## 2025-02-03 DIAGNOSIS — I10 PRIMARY HYPERTENSION: ICD-10-CM

## 2025-02-03 DIAGNOSIS — Z01.818 PREOPERATIVE CLEARANCE: Primary | ICD-10-CM

## 2025-02-03 PROBLEM — I48.91 ATRIAL FIBRILLATION (MULTI): Status: RESOLVED | Noted: 2023-12-29 | Resolved: 2025-02-03

## 2025-02-03 PROCEDURE — 1036F TOBACCO NON-USER: CPT | Performed by: INTERNAL MEDICINE

## 2025-02-03 PROCEDURE — 3074F SYST BP LT 130 MM HG: CPT | Performed by: INTERNAL MEDICINE

## 2025-02-03 PROCEDURE — 3078F DIAST BP <80 MM HG: CPT | Performed by: INTERNAL MEDICINE

## 2025-02-03 PROCEDURE — 1123F ACP DISCUSS/DSCN MKR DOCD: CPT | Performed by: INTERNAL MEDICINE

## 2025-02-03 PROCEDURE — 99214 OFFICE O/P EST MOD 30 MIN: CPT | Performed by: INTERNAL MEDICINE

## 2025-02-03 ASSESSMENT — ENCOUNTER SYMPTOMS
HEMATOLOGIC/LYMPHATIC NEGATIVE: 1
RESPIRATORY NEGATIVE: 1
GASTROINTESTINAL NEGATIVE: 1
PSYCHIATRIC NEGATIVE: 1
CONSTITUTIONAL NEGATIVE: 1
CARDIOVASCULAR NEGATIVE: 1
EYES NEGATIVE: 1

## 2025-02-03 NOTE — PROGRESS NOTES
Subjective   Patient ID: Karly Milton is a 74 y.o. female who presents for preop clearance (Pt here for pre op clearance TRK replacement   by Dr Mon on 2/24/25).    Patient here for preop clearance.  She has history of mild CAD she is intolerant to statins she is on low-dose aspirin.  Will get cardiology clearance tomorrow.  Her airway is Mallampati 3.  She has had no issues with anesthesia in the past.  She is able to walk 3 blocks and climb 2-3 flight of stairs without any chest pain or shortness of breath.         Review of Systems   Constitutional: Negative.    HENT: Negative.     Eyes: Negative.    Respiratory: Negative.     Cardiovascular: Negative.    Gastrointestinal: Negative.    Genitourinary: Negative.    Musculoskeletal:         See HPI   Skin: Negative.    Hematological: Negative.    Psychiatric/Behavioral: Negative.     All other systems reviewed and are negative.      Objective   /72   Pulse 64   Temp 36.6 °C (97.8 °F) (Temporal)   Wt 106 kg (234 lb)   BMI 36.11 kg/m²     Physical Exam  HENT:      Head: Normocephalic and atraumatic.   Cardiovascular:      Rate and Rhythm: Normal rate and regular rhythm.   Musculoskeletal:      Right lower leg: No edema.      Comments: Rt knee+effusion   Neurological:      General: No focal deficit present.      Mental Status: She is oriented to person, place, and time. Mental status is at baseline.         Assessment/Plan   Problem List Items Addressed This Visit             ICD-10-CM    Primary hypertension I10    ASHD (arteriosclerotic heart disease) I25.10    Hyperlipidemia E78.5     Other Visit Diagnoses         Codes    Preoperative clearance    -  Primary Z01.818        Patient is medically cleared for knee replacement.  She will get cardiology clearance tomorrow.  Note regarding clearance will be shared with orthopedics via common EMR.  She has history of mild CAD by cardiac cath back in 07.  She will continue her aspirin except for just before  surgery and continue with lisinopril hydrochlorothiazide for hypertension.

## 2025-02-05 ENCOUNTER — APPOINTMENT (OUTPATIENT)
Dept: CARDIOLOGY | Facility: CLINIC | Age: 75
End: 2025-02-05
Payer: MEDICARE

## 2025-02-05 VITALS
WEIGHT: 235.9 LBS | DIASTOLIC BLOOD PRESSURE: 68 MMHG | HEART RATE: 56 BPM | HEIGHT: 67 IN | SYSTOLIC BLOOD PRESSURE: 120 MMHG | BODY MASS INDEX: 37.02 KG/M2

## 2025-02-05 DIAGNOSIS — R00.2 PALPITATIONS: ICD-10-CM

## 2025-02-05 DIAGNOSIS — Z01.810 PREOP CARDIOVASCULAR EXAM: ICD-10-CM

## 2025-02-05 DIAGNOSIS — E78.2 MIXED HYPERLIPIDEMIA: ICD-10-CM

## 2025-02-05 DIAGNOSIS — R01.1 MURMUR, HEART: ICD-10-CM

## 2025-02-05 DIAGNOSIS — I10 PRIMARY HYPERTENSION: ICD-10-CM

## 2025-02-05 DIAGNOSIS — I49.3 PVC (PREMATURE VENTRICULAR CONTRACTION): ICD-10-CM

## 2025-02-05 DIAGNOSIS — Z01.818 PREOPERATIVE CLEARANCE: ICD-10-CM

## 2025-02-05 DIAGNOSIS — I25.10 ASHD (ARTERIOSCLEROTIC HEART DISEASE): ICD-10-CM

## 2025-02-05 DIAGNOSIS — Z87.891 FORMER SMOKER: ICD-10-CM

## 2025-02-05 PROBLEM — I51.9 LEFT VENTRICULAR DIASTOLIC DYSFUNCTION: Status: RESOLVED | Noted: 2023-12-29 | Resolved: 2025-02-05

## 2025-02-05 PROBLEM — E66.09 CLASS 1 OBESITY DUE TO EXCESS CALORIES WITH BODY MASS INDEX (BMI) OF 34.0 TO 34.9 IN ADULT: Status: RESOLVED | Noted: 2023-05-17 | Resolved: 2025-02-05

## 2025-02-05 PROBLEM — E66.811 CLASS 1 OBESITY DUE TO EXCESS CALORIES WITH BODY MASS INDEX (BMI) OF 34.0 TO 34.9 IN ADULT: Status: RESOLVED | Noted: 2023-05-17 | Resolved: 2025-02-05

## 2025-02-05 PROCEDURE — 3074F SYST BP LT 130 MM HG: CPT | Performed by: INTERNAL MEDICINE

## 2025-02-05 PROCEDURE — 3078F DIAST BP <80 MM HG: CPT | Performed by: INTERNAL MEDICINE

## 2025-02-05 PROCEDURE — 1036F TOBACCO NON-USER: CPT | Performed by: INTERNAL MEDICINE

## 2025-02-05 PROCEDURE — 1159F MED LIST DOCD IN RCRD: CPT | Performed by: INTERNAL MEDICINE

## 2025-02-05 PROCEDURE — 99214 OFFICE O/P EST MOD 30 MIN: CPT | Performed by: INTERNAL MEDICINE

## 2025-02-05 PROCEDURE — 93000 ELECTROCARDIOGRAM COMPLETE: CPT | Performed by: INTERNAL MEDICINE

## 2025-02-05 PROCEDURE — 1123F ACP DISCUSS/DSCN MKR DOCD: CPT | Performed by: INTERNAL MEDICINE

## 2025-02-05 NOTE — PROGRESS NOTES
CARDIOLOGY OFFICE VISIT      CHIEF COMPLAINT      HISTORY OF PRESENT ILLNESS  The patient states she is being seen today for cardiac evaluation prior to upcoming right knee replacement surgery the end of this month.  She states she has had surgeries in the past without problems from a cardiac or anesthesia standpoint.  She denies chest discomfort or symptoms suggest myocardial ischemia.  She denies dyspnea with exertion.  She denies palpitations, prescan syncope.  She is not having any problem with her current medication.  She was found to have mild coronary disease by cardiac catheterization in 2017.  She cannot tolerate statins.  I am going to try her on generic Zetia.  She will let me know if any problem with this.  Her LDL cholesterol was 96 in October 2023.  I told her we will recheck her lipids in 2 months as long as she is tolerating the Zetia.  Also told her like to eventually get echocardiogram to further evaluate cardiac murmur.  I told her she can going to have her surgery from a cardiac standpoint.    EKG: Normal sinus rhythm with sinus arrhythmia, complete right bundle branch block, results discussed with patient  Impression:  Palpitation with PVCs- unable to tolerate Metoprolol, on Magnesium. Dr. Ray managing  Complete Right Bundle Branch Block   Mild CAD, by cath in 2007. No angina  Hypertension   Hyperlipidemia   Cardiac Murmur- will further evaluate with updated Echocardiogram in future  Obese  Former Smoker     Cardiac status is stable at this time.  The patient is a satisfactory risk from a cardiac standpoint for her upcoming needed knee surgery with Dr. Elieser MD assuming his routine preop lab work is satisfactory. Echocardiogram surveillance will not defer clearance for knee surgery at this time per Dr. Lul Hernandez MD.     Please excuse any errors in grammar or translation related to this dictation.  Voice recognition software was utilized to prepare this document.      Past  Medical History  Past Medical History:   Diagnosis Date    Acute bronchitis due to other specified organisms     Acute bacterial bronchitis    Acute upper respiratory infection, unspecified     URTI (acute upper respiratory infection)    Other abnormal glucose     Abnormal blood sugar    Personal history of diseases of the skin and subcutaneous tissue     History of eczema    Personal history of diseases of the skin and subcutaneous tissue     History of hidradenitis suppurativa    Personal history of other diseases of the circulatory system     History of stenosis of renal artery    Personal history of other diseases of the digestive system     History of gastroesophageal reflux (GERD)    Personal history of other diseases of the nervous system and sense organs     History of carpal tunnel syndrome    Personal history of other diseases of the respiratory system     History of common cold    Personal history of other endocrine, nutritional and metabolic disease     History of hypercholesterolemia    Pruritus, unspecified     Itching of ear    Radiculopathy, cervical region     Acute cervical radiculopathy    Right upper quadrant pain     Right upper quadrant abdominal pain       Social History  Social History     Tobacco Use    Smoking status: Former     Current packs/day: 0.00     Types: Cigarettes     Quit date: 1985     Years since quittin.4    Smokeless tobacco: Never   Vaping Use    Vaping status: Never Used   Substance Use Topics    Alcohol use: Not Currently     Comment: Once a month or less    Drug use: Never       Family History     Family History   Problem Relation Name Age of Onset    Pancreatic cancer Mother      Other (pacemaker) Father      Leukemia Father      Diabetes Paternal Grandmother          Allergies:  Allergies   Allergen Reactions    Adhesive Tape-Silicones Other    Amoxicillin Hives    Povidone-Iodine Hives    Statins-Hmg-Coa Reductase Inhibitors Other    Sulfa (Sulfonamide  Antibiotics) Other    Nickel Rash        Outpatient Medications:  Current Outpatient Medications   Medication Instructions    aspirin 81 mg EC tablet 1 tablet, Daily    betamethasone valerate (Valisone) 0.1 % cream 1 Application, As needed    calcium carbonate-vitamin D3 500 mg-3.125 mcg (125 unit) tablet tablet 500 mg, Daily    cholecalciferol (Vitamin D-3) 25 MCG (1000 UT) tablet 1 tablet, Daily    lisinopriL-hydrochlorothiazide 20-12.5 mg tablet TAKE 1 TABLET BY MOUTH EVERY DAY IN THE MORNING, AND 1/2 TABLET AT NIGHT    magnesium oxide (Mag-Ox) 400 mg (241.3 mg magnesium) tablet 1 tablet, 3 times daily    nystatin-triamcinolone (Mycolog II) cream 1 Application, As needed    pyridoxine (Vitamin B-6) 50 mg tablet 1 tablet, Daily    triamcinolone (Kenalog) 0.1 % cream 1 Application, Every 12 hours PRN    vitamin E acid succinate (vitamin E succinate) 67 mg (100 unit) tablet 1 tablet, Daily          REVIEW OF SYSTEMS  ROS      VITALS  Vitals:    02/05/25 1110   BP: 120/68   Pulse: 56       PHYSICAL EXAM  Vitals reviewed.   Constitutional:       Appearance: Normal and healthy appearance. Well-developed and not in distress.   Eyes:      Conjunctiva/sclera: Conjunctivae normal.      Pupils: Pupils are equal, round, and reactive to light.   Neck:      Vascular: No JVR. JVD normal.   Pulmonary:      Effort: Pulmonary effort is normal.      Breath sounds: Normal breath sounds. No wheezing. No rhonchi. No rales.   Chest:      Chest wall: Not tender to palpatation.   Cardiovascular:      PMI at left midclavicular line. Normal rate. Regular rhythm. Normal S1. Normal S2.       Murmurs: There is no murmur.      No gallop.  No click. No rub.   Pulses:     Intact distal pulses.   Edema:     Peripheral edema absent.   Abdominal:      Tenderness: There is no abdominal tenderness.   Musculoskeletal: Normal range of motion.         General: No tenderness.      Cervical back: Normal range of motion. Skin:     General: Skin is warm and  dry.   Neurological:      General: No focal deficit present.      Mental Status: Alert and oriented to person, place and time.   Psychiatric:         Behavior: Behavior is cooperative.           ASSESSMENT AND PLAN  Diagnoses and all orders for this visit:  Preop cardiovascular exam  PVC (premature ventricular contraction)  Palpitations  Mixed hyperlipidemia  ASHD (arteriosclerotic heart disease)  Primary hypertension  BMI 37.0-37.9, adult  Former smoker  Preoperative clearance  Murmur, heart      [unfilled]

## 2025-02-05 NOTE — PATIENT INSTRUCTIONS
Patient to follow up in 1 year with Dr. Lul Hernandez MD      Office will arrange Echo in future to follow up on heart murmur sound- this can be done after your knee surgery and will not defer clearance.     You are cleared for knee surgery with Dr. Elieser MD at this time.     No other changes today.   Continue same medications and treatments.   Patient educated on proper medication use.   Patient educated on risk factor modification.   Please bring any lab results from other providers / physicians to your next appointment.     Please bring all medicines, vitamins, and herbal supplements with you when you come to the office.     Prescriptions will not be filled unless you are compliant with your follow up appointments or have a follow up appointment scheduled as per instruction of your physician. Refills should be requested at the time of your visit.    IAvery RN am scribing for and in the presence of Dr. Lul Camarillo MD

## 2025-02-12 ENCOUNTER — PRE-ADMISSION TESTING (OUTPATIENT)
Dept: PREADMISSION TESTING | Facility: HOSPITAL | Age: 75
End: 2025-02-12
Payer: MEDICARE

## 2025-02-12 VITALS
WEIGHT: 233.25 LBS | RESPIRATION RATE: 16 BRPM | DIASTOLIC BLOOD PRESSURE: 77 MMHG | HEART RATE: 64 BPM | OXYGEN SATURATION: 98 % | BODY MASS INDEX: 37.49 KG/M2 | SYSTOLIC BLOOD PRESSURE: 142 MMHG | HEIGHT: 66 IN

## 2025-02-12 DIAGNOSIS — M17.11 UNILATERAL PRIMARY OSTEOARTHRITIS, RIGHT KNEE: ICD-10-CM

## 2025-02-12 LAB
ALBUMIN SERPL BCP-MCNC: 4.4 G/DL (ref 3.4–5)
ALP SERPL-CCNC: 49 U/L (ref 33–136)
ALT SERPL W P-5'-P-CCNC: 24 U/L (ref 7–45)
ANION GAP SERPL CALC-SCNC: 12 MMOL/L (ref 10–20)
AST SERPL W P-5'-P-CCNC: 24 U/L (ref 9–39)
BASOPHILS # BLD AUTO: 0.05 X10*3/UL (ref 0–0.1)
BASOPHILS NFR BLD AUTO: 0.6 %
BILIRUB SERPL-MCNC: 0.7 MG/DL (ref 0–1.2)
BUN SERPL-MCNC: 26 MG/DL (ref 6–23)
CALCIUM SERPL-MCNC: 9.6 MG/DL (ref 8.6–10.3)
CHLORIDE SERPL-SCNC: 102 MMOL/L (ref 98–107)
CO2 SERPL-SCNC: 27 MMOL/L (ref 21–32)
CREAT SERPL-MCNC: 0.81 MG/DL (ref 0.5–1.05)
EGFRCR SERPLBLD CKD-EPI 2021: 75 ML/MIN/1.73M*2
EOSINOPHIL # BLD AUTO: 0.19 X10*3/UL (ref 0–0.4)
EOSINOPHIL NFR BLD AUTO: 2.3 %
ERYTHROCYTE [DISTWIDTH] IN BLOOD BY AUTOMATED COUNT: 12.5 % (ref 11.5–14.5)
EST. AVERAGE GLUCOSE BLD GHB EST-MCNC: 100 MG/DL
GLUCOSE SERPL-MCNC: 108 MG/DL (ref 74–99)
HBA1C MFR BLD: 5.1 %
HCT VFR BLD AUTO: 40.5 % (ref 36–46)
HGB BLD-MCNC: 14.2 G/DL (ref 12–16)
IMM GRANULOCYTES # BLD AUTO: 0.03 X10*3/UL (ref 0–0.5)
IMM GRANULOCYTES NFR BLD AUTO: 0.4 % (ref 0–0.9)
INR PPP: 1.1 (ref 0.9–1.1)
LYMPHOCYTES # BLD AUTO: 2.98 X10*3/UL (ref 0.8–3)
LYMPHOCYTES NFR BLD AUTO: 36.7 %
MCH RBC QN AUTO: 30.3 PG (ref 26–34)
MCHC RBC AUTO-ENTMCNC: 35.1 G/DL (ref 32–36)
MCV RBC AUTO: 87 FL (ref 80–100)
MONOCYTES # BLD AUTO: 0.78 X10*3/UL (ref 0.05–0.8)
MONOCYTES NFR BLD AUTO: 9.6 %
NEUTROPHILS # BLD AUTO: 4.1 X10*3/UL (ref 1.6–5.5)
NEUTROPHILS NFR BLD AUTO: 50.4 %
NRBC BLD-RTO: 0 /100 WBCS (ref 0–0)
PLATELET # BLD AUTO: 204 X10*3/UL (ref 150–450)
POTASSIUM SERPL-SCNC: 4.1 MMOL/L (ref 3.5–5.3)
PROT SERPL-MCNC: 6.5 G/DL (ref 6.4–8.2)
PROTHROMBIN TIME: 11.9 SECONDS (ref 9.8–12.8)
RBC # BLD AUTO: 4.68 X10*6/UL (ref 4–5.2)
SODIUM SERPL-SCNC: 137 MMOL/L (ref 136–145)
WBC # BLD AUTO: 8.1 X10*3/UL (ref 4.4–11.3)

## 2025-02-12 PROCEDURE — 80053 COMPREHEN METABOLIC PANEL: CPT

## 2025-02-12 PROCEDURE — 87081 CULTURE SCREEN ONLY: CPT | Mod: ELYLAB

## 2025-02-12 PROCEDURE — 83036 HEMOGLOBIN GLYCOSYLATED A1C: CPT | Mod: ELYLAB

## 2025-02-12 PROCEDURE — 85610 PROTHROMBIN TIME: CPT

## 2025-02-12 PROCEDURE — 36415 COLL VENOUS BLD VENIPUNCTURE: CPT

## 2025-02-12 PROCEDURE — 85025 COMPLETE CBC W/AUTO DIFF WBC: CPT

## 2025-02-12 NOTE — PREPROCEDURE INSTRUCTIONS
KNEE & HIP JOINT REPLACEMENT PRE-OPERATIVE INSTRUCTIONS     You will receive notification one business day prior to your surgery to confirm your arrival time and any additional information between 2 P.M. - 5 P.M. It is important that you answer your phone and/or check your messages during this time.     You may see in [place] your surgery start time change several times even up to the day before your procedure. Please disregard those times and only follow the time given by the  who will be notifying you via phone and not a text.     Please arrive at your scheduled time to avoid delay or cancelled surgery.     Please enter the building through either the Main Entrance in front of the hospital or from the Parking Garage Walk Way Bridge. From the parking garage, which is free, take the 2nd Floor Walkway Bridge into the hospital and check in at the [facility] Outpatient Desk as you enter the hospital directly in front of you. If you enter through the Main Entrance take the elevator off the lobby on the right labeled “A” to the 2nd floor and check in at the [facility] Outpatient Surgery desk as you exit the elevator. Wheelchairs are available for use if using the Main Entrance.     Handicap parking in the land lot, in front of hospital by main entrance, wheelchairs are available at this entrance     ?   INSTRUCTIONS:   Please contact your doctor’s office, who is doing procedure, about any changes in your health, bad cold, fever, sore throat, or COVID within last 4 weeks     Talk to your surgeon for instructions if you should stop your aspirin, blood thinner, diabetes medicines, weight loss medications, multivitamins or over the counter supplements since many surgeons have you adjust or stop these medications prior to procedure. The doctor’s office may have you contact the prescribing doctor for medication adjustments for your surgery.     If you take certain medications like Beta Blocker or Anti-Seizure medication, you may have  to take them the morning of procedure with a sip of water. If this is the case your surgeons office should let you know, and the PAT nurses will follow up when they speak to you to verify you are aware.     If not staying overnight after your surgery, and you are receiving any type of anesthesia with your surgery, you must have an adult (age 18 or older) immediately available to drive you home after surgery or your procedure will be cancelled. You may be discharged home after surgery per an Uber, Lyft, Taxi or any other transportation service as long as the responsible adult (18 or older) is in the vehicle with you at time of discharge. The  of these transportation services is not responsible for your care and cannot be consider a responsible adult. We also highly recommend you have someone stay with you for the entire day and night of your surgery.     All jewelry and piercings must be removed. If you are unable to remove an item or have a dermal piercing, please be sure to tell the nurse when you arrive for surgery.     Nail polish must be removed off one finger of each hand     Make-up or other beauty products (lotion, deodorant, hairspray, perfume, etc.) must be removed or not used for day of surgery.     Avoid smoking, consuming alcohol, or any medical or recreational drug use for 24 hours before surgery.     Do not wear contacts to hospital, bring your glasses and a case     Leave valuables at home except photo ID, insurance card and any co-payment that has been requested by hospital.     For adults who are unable to consent or make medical decisions for themselves, a legal guardian or Power of  must accompany them to the surgery center. If this is not possible, please call [phone] to make additional arrangements.  If there is any guardianship or legal Power of  paperwork, please bring them the day of surgery.     Wear comfortable, loose fitting clothing into the procedure.  While  your admitted you are asked to bring short sleeve shirts, shorts (loose like pajamas, sweat shorts), and tennis shoes/sneakers.  No slip on shoes.     Please bring your 2-Wheeled Walker with you the day of surgery and the [facility] Booklet that was given to you during your PAT appointment.     The nurse practitioners, , , physical therapist, and occupational therapist will all discuss and work with you during your stay to help with discharge, physical therapy and any other needs. They also may discuss a program called Meds to Beds, where postoperative medications prescribed to you after surgery will be filled and delivered to your beside before you leave, so that you do not need to stop at the pharmacy on the way home    If you use a CPAP or BIPAP, please make sure the PAT nurse was able to get the settings from you, if not please inform the nurses the day of surgery of your settings you use at home.     Additional instructions about eating and drinking before surgery:     Do not eat any solid foods?or drink anything after midnight the night prior to your procedure. Milk, nutritional drinks/supplements, and infant formula are considered solid foods.  This also includes no gum, candy, lozenges, ice, or any other oral consumption.     CHG SOAP & ORAL RINSE   In regards to bathing, please follow the instructions explained to you at the PAT appointment about taking a showering with the CHG soap the 5 nights prior to your surgery.       During your PAT Appointment you were given [place] CHG Wash Soap (bottles of bubble gum pink soap) to use before procedure.  Begin using the CHG soap 5 days before your scheduled surgery.  Be sure to sleep on clean sheets - change your sheets the first night you do this cleansing process (you don't not need to change them every night).     CHG SOAP INSTRUCTIONS:  Begin using the CHG soap 5 days prior to your scheduled surgery.  You will wash and  rinse your face and genitals with normal soap.  The night before surgery is the ONLY TIME you use the CHG SOAP for your hair, and do NOT use conditioner after washing with the CHG Soap.  For the rest of the showers the 5 days prior to surgery you will use normal shampoo.  Hair extensions should be removed.  Then apply CHG soap solution to a clean wet washcloth.     Turn the water off or move away from the water spray to avoid premature rinsing of the CHG soap as you apply it.   Avoid getting the CHG soap in your ears, eyes, and mouth.  Apply the CHG soap to entire body from the neck down EXCEPT your face and genitals.  Allow the CHG soap to sit for 3 minutes on your skin.  Then turn on water and rinse the CHG solution off your body completely.    DO NOT wash with regular soap after you have used the CHG soap   Pat yourself dry with a clean, fresh-laundered towel when you get out of the shower and clean Pj's  DO NOT apply any powders, deodorants, or lotions after shower   Be aware the CHG soap will stain fabrics if you wash them with bleach after use.   Make sure to pay special attention to cleaning area(s) where your incision(s) will be located. Avoid scrubbing your skin to hard.  You will repeat this same process steps 1-12 for each shower you take prior to surgery.  The morning of  the surgery is the fifth day.      ORAL RINSE   You will receive a call or notification from your pharmacy to  a prescription prior to procedure.  Be sure to  the prescription oral rinse from your local pharmacy.   You will be using the oral rinse the night before and the morning of surgery.    Take a cap full of the solution, 15mL   Swish (gargle if you can) the mouthwash in your mouth for at least 30 seconds, (DO NOT SWALLOW), and spit out   After the oral CHG rinse, do not rinse your mouth with water, drink, or eat.      If you have to take a medication the morning of surgery as instructed by your surgeon- please take  that medication with a sip of water prior to doing the oral rinse the day of surgery.       ?If you have any questions or concerns, please call Pre-Admission Testing at [phone] or your Physician’s office   Dr. Miles Mon   [phone]  [facility] General Line: [phone]

## 2025-02-14 LAB — STAPHYLOCOCCUS SPEC CULT: NORMAL

## 2025-02-14 NOTE — PROGRESS NOTES
Physical Therapy  Physical Therapy Pre-Op Evaluation    Patient Name: Karly Milton  MRN: [id number]  Today's Date: 2/14/2025                          Insurance:    Visit number: 1 of 1  Authorization info: NO AUTH REQ   Insurance Type: ANTHEM TO FOLLOW MEDICARE   General:  Reason for visit: ***      Precautions: ***       Medical History Form: Reviewed (scanned into chart)    Subjective:     Chief Complaint: Patient presents to clinic ***      Current Condition:   {Current Condition:79109}    Pain:         Patients Living Environment: Reviewed and no concern      Red Flags: Do you have any of the following? {Yes/No:21675}  Fever/chills, unexplained weight changes, dizziness/fainting, unexplained change in bowel or bladder functions, unexplained malaise or muscle weakness, night pain/sweats, numbness or tingling    Objective:    Knee ROM  F   E     Knee STRENGTH  F   E       Posture: ***    Lower Extremity Functional Movements  Transfers: ***  Gait: ***      Outcome Measures: to be taken at Re-Eval    EDUCATION: Pt educated in Post-operative Exs, surgery risks, proper gait w/ device (if applicable), signs of infection, when to call doctor, precautions and issued hand booklet.    Goals: To be set at Re-Eval based on physical and functional status      Assessment:  Pt understands post-operative materials.    Plan:  Have surgery and follow up with OP PT when appropriate    Marvin Castano PT

## 2025-02-18 ENCOUNTER — APPOINTMENT (OUTPATIENT)
Dept: PHYSICAL THERAPY | Facility: CLINIC | Age: 75
End: 2025-02-18
Payer: MEDICARE

## 2025-02-18 ENCOUNTER — APPOINTMENT (OUTPATIENT)
Dept: ORTHOPEDIC SURGERY | Facility: CLINIC | Age: 75
End: 2025-02-18
Payer: MEDICARE

## 2025-02-18 DIAGNOSIS — Z01.818 PREOP EXAMINATION: Primary | ICD-10-CM

## 2025-02-18 RX ORDER — CHLORHEXIDINE GLUCONATE ORAL RINSE 1.2 MG/ML
15 SOLUTION DENTAL AS NEEDED
Qty: 120 ML | Refills: 0 | Status: SHIPPED | OUTPATIENT
Start: 2025-02-18 | End: 2025-03-04

## 2025-02-18 NOTE — PROGRESS NOTES
Peridex mouthwash sent to pharmacy. Patient to use as directed the night before and morning of upcoming orthopedic surgery.

## 2025-02-19 ENCOUNTER — EVALUATION (OUTPATIENT)
Dept: PHYSICAL THERAPY | Facility: HOSPITAL | Age: 75
End: 2025-02-19
Payer: MEDICARE

## 2025-02-19 ENCOUNTER — PREP FOR PROCEDURE (OUTPATIENT)
Dept: ORTHOPEDIC SURGERY | Facility: CLINIC | Age: 75
End: 2025-02-19

## 2025-02-19 ENCOUNTER — OFFICE VISIT (OUTPATIENT)
Dept: ORTHOPEDIC SURGERY | Facility: CLINIC | Age: 75
End: 2025-02-19
Payer: MEDICARE

## 2025-02-19 DIAGNOSIS — M17.11 PRIMARY OSTEOARTHRITIS OF RIGHT KNEE: Primary | ICD-10-CM

## 2025-02-19 DIAGNOSIS — G89.29 CHRONIC PAIN OF RIGHT KNEE: ICD-10-CM

## 2025-02-19 DIAGNOSIS — M25.561 CHRONIC PAIN OF RIGHT KNEE: ICD-10-CM

## 2025-02-19 DIAGNOSIS — M17.10 ARTHRITIS OF KNEE: Primary | ICD-10-CM

## 2025-02-19 PROCEDURE — 97116 GAIT TRAINING THERAPY: CPT | Mod: GP

## 2025-02-19 PROCEDURE — 1123F ACP DISCUSS/DSCN MKR DOCD: CPT | Performed by: ORTHOPAEDIC SURGERY

## 2025-02-19 PROCEDURE — 97161 PT EVAL LOW COMPLEX 20 MIN: CPT | Mod: GP

## 2025-02-19 ASSESSMENT — ENCOUNTER SYMPTOMS
OCCASIONAL FEELINGS OF UNSTEADINESS: 0
LOSS OF SENSATION IN FEET: 1
DEPRESSION: 0

## 2025-02-19 NOTE — PROGRESS NOTES
Physical Therapy    Physical Therapy Evaluation and Treatment      Patient Name: Karly Milton  MRN: [id number]  Today's Date: 2/19/2025    Time Entry:   Time Calculation  Start Time: 0915  Stop Time: 0950  Time Calculation (min): 35 min  PT Evaluation Time Entry  PT Evaluation (Low) Time Entry: 15  PT Therapeutic Procedures Time Entry  Gait Training Time Entry: 20                 Insurance:  MEDICARE/ ANTHEM 2410($0 USED ) COPAY 0   ( $136 ) COVERAGE 80/100 OOP 0                         ANTHEM TO FOLLOW MEDICARE   NO AUTH REQ                              [id number]    Visit: 1    Assessment:    PT Pre-Op Assessment  Patient demonstrates independent knowledge and understanding of: anatomy, surgical procedure, post-operative exercise programs, signs and symptoms of post-operative infection and post-operative management of pain.    Patient demonstrates good safety awareness and modified independence with ambulation: gait pattern three point with RLE weight bearing on WBAT, on level surfaces and on stairs    Handouts Given: surgical overview booklet, post-operative exercises, gait training instructions    Plan of Care: Patient will be placed on hold for therapy until after completion of surgical procedure. Upon return to therapy, patient's status will be re-assessed and goal updated    Patient plans for post op follow up at [facility]       Current Problem:   1. Arthritis of knee        2. Chronic pain of right knee            Subjective    General:  Medical History Form: Reviewed (scanned into chart)    Subjective:     Chief Complaint: Patient presents to clinic R knee pain. Scheduled for R TKA 2/24/25. Pain in R knee pain for several years. Notes that she used to work outside.     Current Condition:   Worse    Pain:     Location: R knee   Description: 0/10 pain   Aggravating Factors:  driving, and standing  Relieving Factors:  Rest and Sitting    Relevant Information (PMH & Previous Tests/Imaging): xray    Previous Interventions/Treatments: Injections    Prior Level of Function (PLOF)  Patient previously independent with all ADLs  Work/School: retired      Patients Living Environment: Reviewed and no concern    Primary Language: English    PMH: heart disease, HTN, neuropathy, OA      Red Flags: Do you have any of the following? No  Fever/chills, unexplained weight changes, dizziness/fainting, unexplained change in bowel or bladder functions, unexplained malaise or muscle weakness, night pain/sweats, numbness or tingling       Home Living: lives alone, 2 adult children that will help after surgery   0 JENY, 2 level home with HR. Bedroom and shower are upstairs    1/2 bath on 2nd level   Recliner   Owns a walker     Prior Level of Function:       Objective   Objective    GAIT: uneven step length, no R TKE                               ROM  Right knee flexion: 102   Left knee flexion: WFL  Right knee extension: -10    Left knee extension: WFL            MMT  Right quadriceps: 4/5    Left quadriceps: 4/5  Right iliopsoas: 4/5    Left iliopsoas: 4/5  Right hamstrin/5   Left hamstrin/5

## 2025-02-19 NOTE — PROGRESS NOTES
Subjective    Patient ID: Karly    Chief Complaint:   Chief Complaint   Patient presents with    Right Knee - Pre-op Visit     TKR 2/24/25     This patient has pain in the knee that is worsening.  The pain increases with activity and with weightbearing, and interferes with daily activities.  There is pain with range of motion, limited range of motion, crepitus and swelling.  The x-ray demonstrates joint space narrowing, osteophyte formation, and subchondral sclerosis.  The symptoms have worsened in spite of extensive medical treatment, therapy, and external support over at least the past 3 months.     This is the preop visit to discuss the risks and benefits of the total knee replacement surgery.  These risks were fully explained to the patient.  With this, and any surgery, infection is a risk, this is usually 1 to 2%, even higher in diabetics, persons with rheumatoid arthritis, previous surgeries, on oral steroid medications, and obesity.  All of these issues were properly addressed.  We always assure all sterile techniques will be followed.  Patient will also need to be on antibiotics for the next 2 years for any minor surgery, even dental work.  For high risk patients, this will be for lifetime.  Severe infections may require removal of the prosthesis.     It was also explained to the patient that there will be some minor blood loss during the procedure and a blood transfusion may be recommended if medically necessary.  It is also noted that with knee surgery a tourniquet is applied to the lower extremity to limit blood loss during the procedure.     Pulmonary embolism and blood clots are also discussed with the patient.  We discussed that these can be fatal complications to the surgery.  It is explained to the patient that the use of thromboembolic stockings, foot pumps, incentive spirometer's, early mobility, and the use of blood thinners for period of time is the standard of care for prevention of blood  clots.     Patient's preoperative range of motion is 0-120 degrees.  It is explained to the patient that this type of surgery is to decrease arthritis pain and sometimes stiffness may occur.  It is important that the patient go to physical therapy postoperatively.  It is also stated that occasionally we will have to manipulate the knee if pain and stiffness persists.     Loosening and wear of the prosthesis are also discussed.  The prosthesis normally last between 12 and 15 years.  Revisions may be more complicated and with higher risk.     Fractures, though rare, may also occur intraoperatively.  These fractures may be to the tibia or to the femur.  There may be nerve or arterial injuries as well and these are discussed in detail.     Lastly, the benefits of spinal anesthesia were explained to the patient.     All of the patient's questions and concerns were answered.     This note was prepared using voice recognition software.  The details of this note are correct and have been reviewed, and corrected to the best of my ability.  Some grammatical areas may persist related to the Dragon software     Larry Tejeda PA-C     [phone]

## 2025-02-21 ENCOUNTER — APPOINTMENT (OUTPATIENT)
Dept: ORTHOPEDIC SURGERY | Facility: CLINIC | Age: 75
End: 2025-02-21
Payer: MEDICARE

## 2025-02-21 ENCOUNTER — APPOINTMENT (OUTPATIENT)
Dept: PHYSICAL THERAPY | Facility: CLINIC | Age: 75
End: 2025-02-21
Payer: MEDICARE

## 2025-02-24 ENCOUNTER — HOSPITAL ENCOUNTER (OUTPATIENT)
Facility: HOSPITAL | Age: 75
Discharge: HOME | End: 2025-02-25
Attending: ORTHOPAEDIC SURGERY | Admitting: ORTHOPAEDIC SURGERY
Payer: MEDICARE

## 2025-02-24 ENCOUNTER — APPOINTMENT (OUTPATIENT)
Dept: RADIOLOGY | Facility: HOSPITAL | Age: 75
End: 2025-02-24
Payer: MEDICARE

## 2025-02-24 ENCOUNTER — ANESTHESIA (OUTPATIENT)
Dept: OPERATING ROOM | Facility: HOSPITAL | Age: 75
End: 2025-02-24
Payer: MEDICARE

## 2025-02-24 ENCOUNTER — ANESTHESIA EVENT (OUTPATIENT)
Dept: OPERATING ROOM | Facility: HOSPITAL | Age: 75
End: 2025-02-24
Payer: MEDICARE

## 2025-02-24 DIAGNOSIS — Z96.651 S/P TOTAL KNEE REPLACEMENT, RIGHT: ICD-10-CM

## 2025-02-24 DIAGNOSIS — M17.11 UNILATERAL PRIMARY OSTEOARTHRITIS, RIGHT KNEE: Primary | ICD-10-CM

## 2025-02-24 PROCEDURE — 73560 X-RAY EXAM OF KNEE 1 OR 2: CPT | Mod: RT

## 2025-02-24 PROCEDURE — 27447 TOTAL KNEE ARTHROPLASTY: CPT | Performed by: ORTHOPAEDIC SURGERY

## 2025-02-24 PROCEDURE — 2500000004 HC RX 250 GENERAL PHARMACY W/ HCPCS (ALT 636 FOR OP/ED): Performed by: ORTHOPAEDIC SURGERY

## 2025-02-24 PROCEDURE — 97161 PT EVAL LOW COMPLEX 20 MIN: CPT | Mod: GP

## 2025-02-24 PROCEDURE — 2720000007 HC OR 272 NO HCPCS: Performed by: ORTHOPAEDIC SURGERY

## 2025-02-24 PROCEDURE — 7100000011 HC EXTENDED STAY RECOVERY HOURLY - NURSING UNIT

## 2025-02-24 PROCEDURE — 97116 GAIT TRAINING THERAPY: CPT | Mod: GP

## 2025-02-24 PROCEDURE — 2500000004 HC RX 250 GENERAL PHARMACY W/ HCPCS (ALT 636 FOR OP/ED): Performed by: STUDENT IN AN ORGANIZED HEALTH CARE EDUCATION/TRAINING PROGRAM

## 2025-02-24 PROCEDURE — 7100000001 HC RECOVERY ROOM TIME - INITIAL BASE CHARGE: Performed by: ORTHOPAEDIC SURGERY

## 2025-02-24 PROCEDURE — C1713 ANCHOR/SCREW BN/BN,TIS/BN: HCPCS | Performed by: ORTHOPAEDIC SURGERY

## 2025-02-24 PROCEDURE — 3600000005 HC OR TIME - INITIAL BASE CHARGE - PROCEDURE LEVEL FIVE: Performed by: ORTHOPAEDIC SURGERY

## 2025-02-24 PROCEDURE — 97110 THERAPEUTIC EXERCISES: CPT | Mod: GP

## 2025-02-24 PROCEDURE — 3600000010 HC OR TIME - EACH INCREMENTAL 1 MINUTE - PROCEDURE LEVEL FIVE: Performed by: ORTHOPAEDIC SURGERY

## 2025-02-24 PROCEDURE — C1776 JOINT DEVICE (IMPLANTABLE): HCPCS | Performed by: ORTHOPAEDIC SURGERY

## 2025-02-24 PROCEDURE — 3700000002 HC GENERAL ANESTHESIA TIME - EACH INCREMENTAL 1 MINUTE: Performed by: ORTHOPAEDIC SURGERY

## 2025-02-24 PROCEDURE — 3700000001 HC GENERAL ANESTHESIA TIME - INITIAL BASE CHARGE: Performed by: ORTHOPAEDIC SURGERY

## 2025-02-24 PROCEDURE — 7100000002 HC RECOVERY ROOM TIME - EACH INCREMENTAL 1 MINUTE: Performed by: ORTHOPAEDIC SURGERY

## 2025-02-24 PROCEDURE — 2780000003 HC OR 278 NO HCPCS: Performed by: ORTHOPAEDIC SURGERY

## 2025-02-24 PROCEDURE — 2500000004 HC RX 250 GENERAL PHARMACY W/ HCPCS (ALT 636 FOR OP/ED): Performed by: NURSE ANESTHETIST, CERTIFIED REGISTERED

## 2025-02-24 PROCEDURE — 2500000001 HC RX 250 WO HCPCS SELF ADMINISTERED DRUGS (ALT 637 FOR MEDICARE OP): Performed by: ORTHOPAEDIC SURGERY

## 2025-02-24 PROCEDURE — 73560 X-RAY EXAM OF KNEE 1 OR 2: CPT | Mod: RIGHT SIDE | Performed by: RADIOLOGY

## 2025-02-24 PROCEDURE — 2500000005 HC RX 250 GENERAL PHARMACY W/O HCPCS: Performed by: ORTHOPAEDIC SURGERY

## 2025-02-24 PROCEDURE — 2500000002 HC RX 250 W HCPCS SELF ADMINISTERED DRUGS (ALT 637 FOR MEDICARE OP, ALT 636 FOR OP/ED): Performed by: ORTHOPAEDIC SURGERY

## 2025-02-24 PROCEDURE — 64473 LWR XTR FSCL PLN BLK UNI NJX: CPT | Mod: XP,RT

## 2025-02-24 DEVICE — IMPLANTABLE DEVICE
Type: IMPLANTABLE DEVICE | Site: KNEE | Status: FUNCTIONAL
Brand: PERSONA® VIVACIT-E®

## 2025-02-24 DEVICE — IMPLANTABLE DEVICE
Type: IMPLANTABLE DEVICE | Site: KNEE | Status: FUNCTIONAL
Brand: BIOMET® BONE CEMENT R

## 2025-02-24 DEVICE — DRILL PIN, TROCAR, HEADLESS: Type: IMPLANTABLE DEVICE | Site: KNEE | Status: NON-FUNCTIONAL

## 2025-02-24 DEVICE — IMPLANTABLE DEVICE
Type: IMPLANTABLE DEVICE | Site: KNEE | Status: FUNCTIONAL
Brand: PERSONA®

## 2025-02-24 DEVICE — IMPLANTABLE DEVICE: Type: IMPLANTABLE DEVICE | Site: KNEE | Status: FUNCTIONAL

## 2025-02-24 RX ORDER — PROCHLORPERAZINE 25 MG/1
25 SUPPOSITORY RECTAL EVERY 12 HOURS PRN
Status: DISCONTINUED | OUTPATIENT
Start: 2025-02-24 | End: 2025-02-25 | Stop reason: HOSPADM

## 2025-02-24 RX ORDER — MORPHINE SULFATE 2 MG/ML
2 INJECTION, SOLUTION INTRAMUSCULAR; INTRAVENOUS EVERY 2 HOUR PRN
Status: DISCONTINUED | OUTPATIENT
Start: 2025-02-24 | End: 2025-02-25 | Stop reason: HOSPADM

## 2025-02-24 RX ORDER — DROPERIDOL 2.5 MG/ML
0.62 INJECTION, SOLUTION INTRAMUSCULAR; INTRAVENOUS ONCE AS NEEDED
Status: DISCONTINUED | OUTPATIENT
Start: 2025-02-24 | End: 2025-02-24 | Stop reason: HOSPADM

## 2025-02-24 RX ORDER — ONDANSETRON 4 MG/1
4 TABLET, ORALLY DISINTEGRATING ORAL EVERY 8 HOURS PRN
Status: DISCONTINUED | OUTPATIENT
Start: 2025-02-24 | End: 2025-02-25 | Stop reason: HOSPADM

## 2025-02-24 RX ORDER — TRANEXAMIC ACID 650 MG/1
1950 TABLET ORAL ONCE
Status: COMPLETED | OUTPATIENT
Start: 2025-02-24 | End: 2025-02-24

## 2025-02-24 RX ORDER — PROPOFOL 10 MG/ML
INJECTION, EMULSION INTRAVENOUS CONTINUOUS PRN
Status: DISCONTINUED | OUTPATIENT
Start: 2025-02-24 | End: 2025-02-24

## 2025-02-24 RX ORDER — ONDANSETRON HYDROCHLORIDE 2 MG/ML
4 INJECTION, SOLUTION INTRAVENOUS EVERY 8 HOURS PRN
Status: DISCONTINUED | OUTPATIENT
Start: 2025-02-24 | End: 2025-02-25 | Stop reason: HOSPADM

## 2025-02-24 RX ORDER — DOCUSATE SODIUM 100 MG/1
100 CAPSULE, LIQUID FILLED ORAL 2 TIMES DAILY
Status: DISCONTINUED | OUTPATIENT
Start: 2025-02-24 | End: 2025-02-25 | Stop reason: HOSPADM

## 2025-02-24 RX ORDER — ROPIVACAINE HYDROCHLORIDE 2 MG/ML
20 INJECTION, SOLUTION EPIDURAL; INFILTRATION; PERINEURAL ONCE
Status: COMPLETED | OUTPATIENT
Start: 2025-02-24 | End: 2025-02-24

## 2025-02-24 RX ORDER — CYCLOBENZAPRINE HCL 10 MG
10 TABLET ORAL 3 TIMES DAILY PRN
Status: DISCONTINUED | OUTPATIENT
Start: 2025-02-24 | End: 2025-02-25 | Stop reason: HOSPADM

## 2025-02-24 RX ORDER — OXYCODONE HYDROCHLORIDE 5 MG/1
5 TABLET ORAL EVERY 4 HOURS PRN
Status: DISCONTINUED | OUTPATIENT
Start: 2025-02-24 | End: 2025-02-24

## 2025-02-24 RX ORDER — CELECOXIB 200 MG/1
200 CAPSULE ORAL ONCE
Status: COMPLETED | OUTPATIENT
Start: 2025-02-24 | End: 2025-02-24

## 2025-02-24 RX ORDER — MIDAZOLAM HYDROCHLORIDE 1 MG/ML
INJECTION, SOLUTION INTRAMUSCULAR; INTRAVENOUS AS NEEDED
Status: DISCONTINUED | OUTPATIENT
Start: 2025-02-24 | End: 2025-02-24

## 2025-02-24 RX ORDER — BISACODYL 5 MG
10 TABLET, DELAYED RELEASE (ENTERIC COATED) ORAL DAILY PRN
Status: DISCONTINUED | OUTPATIENT
Start: 2025-02-24 | End: 2025-02-25 | Stop reason: HOSPADM

## 2025-02-24 RX ORDER — FENTANYL CITRATE 50 UG/ML
50 INJECTION, SOLUTION INTRAMUSCULAR; INTRAVENOUS EVERY 5 MIN PRN
Status: DISCONTINUED | OUTPATIENT
Start: 2025-02-24 | End: 2025-02-24 | Stop reason: HOSPADM

## 2025-02-24 RX ORDER — ACETAMINOPHEN 325 MG/1
650 TABLET ORAL EVERY 6 HOURS SCHEDULED
Status: DISCONTINUED | OUTPATIENT
Start: 2025-02-24 | End: 2025-02-25 | Stop reason: HOSPADM

## 2025-02-24 RX ORDER — LIDOCAINE HYDROCHLORIDE 10 MG/ML
0.1 INJECTION, SOLUTION EPIDURAL; INFILTRATION; INTRACAUDAL; PERINEURAL ONCE
Status: DISCONTINUED | OUTPATIENT
Start: 2025-02-24 | End: 2025-02-24 | Stop reason: HOSPADM

## 2025-02-24 RX ORDER — LABETALOL HYDROCHLORIDE 5 MG/ML
5 INJECTION, SOLUTION INTRAVENOUS ONCE AS NEEDED
Status: DISCONTINUED | OUTPATIENT
Start: 2025-02-24 | End: 2025-02-24 | Stop reason: HOSPADM

## 2025-02-24 RX ORDER — PROCHLORPERAZINE MALEATE 5 MG
10 TABLET ORAL EVERY 6 HOURS PRN
Status: DISCONTINUED | OUTPATIENT
Start: 2025-02-24 | End: 2025-02-25 | Stop reason: HOSPADM

## 2025-02-24 RX ORDER — SODIUM CHLORIDE 0.9 G/100ML
INJECTION, SOLUTION IRRIGATION AS NEEDED
Status: DISCONTINUED | OUTPATIENT
Start: 2025-02-24 | End: 2025-02-24 | Stop reason: HOSPADM

## 2025-02-24 RX ORDER — ASPIRIN 81 MG/1
81 TABLET ORAL 2 TIMES DAILY
Status: DISCONTINUED | OUTPATIENT
Start: 2025-02-24 | End: 2025-02-25 | Stop reason: HOSPADM

## 2025-02-24 RX ORDER — TRANEXAMIC ACID 650 MG/1
1950 TABLET ORAL ONCE
Status: COMPLETED | OUTPATIENT
Start: 2025-02-25 | End: 2025-02-25

## 2025-02-24 RX ORDER — KETOROLAC TROMETHAMINE 30 MG/ML
15 INJECTION, SOLUTION INTRAMUSCULAR; INTRAVENOUS EVERY 6 HOURS
Status: COMPLETED | OUTPATIENT
Start: 2025-02-24 | End: 2025-02-25

## 2025-02-24 RX ORDER — OXYCODONE HYDROCHLORIDE 5 MG/1
10 TABLET ORAL EVERY 6 HOURS PRN
Status: DISCONTINUED | OUTPATIENT
Start: 2025-02-24 | End: 2025-02-24

## 2025-02-24 RX ORDER — ROPIVACAINE/EPI/CLONIDINE/KET 2.46-0.005
SYRINGE (ML) INJECTION AS NEEDED
Status: DISCONTINUED | OUTPATIENT
Start: 2025-02-24 | End: 2025-02-24 | Stop reason: HOSPADM

## 2025-02-24 RX ORDER — ONDANSETRON HYDROCHLORIDE 2 MG/ML
INJECTION, SOLUTION INTRAVENOUS AS NEEDED
Status: DISCONTINUED | OUTPATIENT
Start: 2025-02-24 | End: 2025-02-24

## 2025-02-24 RX ORDER — PHENYLEPHRINE HYDROCHLORIDE 10 MG/ML
INJECTION INTRAVENOUS AS NEEDED
Status: DISCONTINUED | OUTPATIENT
Start: 2025-02-24 | End: 2025-02-24

## 2025-02-24 RX ORDER — PROCHLORPERAZINE EDISYLATE 5 MG/ML
10 INJECTION INTRAMUSCULAR; INTRAVENOUS EVERY 6 HOURS PRN
Status: DISCONTINUED | OUTPATIENT
Start: 2025-02-24 | End: 2025-02-25 | Stop reason: HOSPADM

## 2025-02-24 RX ORDER — SODIUM CHLORIDE, SODIUM LACTATE, POTASSIUM CHLORIDE, CALCIUM CHLORIDE 600; 310; 30; 20 MG/100ML; MG/100ML; MG/100ML; MG/100ML
50 INJECTION, SOLUTION INTRAVENOUS CONTINUOUS
Status: ACTIVE | OUTPATIENT
Start: 2025-02-24 | End: 2025-02-24

## 2025-02-24 RX ORDER — DIPHENHYDRAMINE HYDROCHLORIDE 50 MG/ML
12.5 INJECTION INTRAMUSCULAR; INTRAVENOUS ONCE AS NEEDED
Status: DISCONTINUED | OUTPATIENT
Start: 2025-02-24 | End: 2025-02-24 | Stop reason: HOSPADM

## 2025-02-24 RX ORDER — NALOXONE HYDROCHLORIDE 1 MG/ML
0.2 INJECTION INTRAMUSCULAR; INTRAVENOUS; SUBCUTANEOUS EVERY 5 MIN PRN
Status: DISCONTINUED | OUTPATIENT
Start: 2025-02-24 | End: 2025-02-25 | Stop reason: HOSPADM

## 2025-02-24 RX ORDER — MEPERIDINE HYDROCHLORIDE 25 MG/ML
12.5 INJECTION INTRAMUSCULAR; INTRAVENOUS; SUBCUTANEOUS EVERY 10 MIN PRN
Status: DISCONTINUED | OUTPATIENT
Start: 2025-02-24 | End: 2025-02-24 | Stop reason: HOSPADM

## 2025-02-24 RX ORDER — LIDOCAINE HYDROCHLORIDE 10 MG/ML
INJECTION, SOLUTION INFILTRATION; PERINEURAL AS NEEDED
Status: DISCONTINUED | OUTPATIENT
Start: 2025-02-24 | End: 2025-02-24

## 2025-02-24 RX ORDER — CEFAZOLIN SODIUM 2 G/100ML
2 INJECTION, SOLUTION INTRAVENOUS ONCE
Status: COMPLETED | OUTPATIENT
Start: 2025-02-24 | End: 2025-02-24

## 2025-02-24 RX ORDER — SODIUM CHLORIDE, SODIUM LACTATE, POTASSIUM CHLORIDE, CALCIUM CHLORIDE 600; 310; 30; 20 MG/100ML; MG/100ML; MG/100ML; MG/100ML
100 INJECTION, SOLUTION INTRAVENOUS CONTINUOUS
Status: CANCELLED | OUTPATIENT
Start: 2025-02-24 | End: 2025-02-25

## 2025-02-24 RX ORDER — ACETAMINOPHEN 325 MG/1
975 TABLET ORAL ONCE
Status: COMPLETED | OUTPATIENT
Start: 2025-02-24 | End: 2025-02-24

## 2025-02-24 RX ORDER — OXYCODONE HYDROCHLORIDE 5 MG/1
5 TABLET ORAL EVERY 4 HOURS PRN
Status: DISCONTINUED | OUTPATIENT
Start: 2025-02-24 | End: 2025-02-25 | Stop reason: HOSPADM

## 2025-02-24 RX ORDER — OXYCODONE HYDROCHLORIDE 5 MG/1
10 TABLET ORAL EVERY 4 HOURS PRN
Status: DISCONTINUED | OUTPATIENT
Start: 2025-02-24 | End: 2025-02-25 | Stop reason: HOSPADM

## 2025-02-24 RX ORDER — GABAPENTIN 300 MG/1
600 CAPSULE ORAL ONCE
Status: COMPLETED | OUTPATIENT
Start: 2025-02-24 | End: 2025-02-24

## 2025-02-24 RX ORDER — BUPIVACAINE HYDROCHLORIDE 7.5 MG/ML
INJECTION INTRAVENOUS AS NEEDED
Status: DISCONTINUED | OUTPATIENT
Start: 2025-02-24 | End: 2025-02-24

## 2025-02-24 RX ORDER — CEFAZOLIN SODIUM 2 G/100ML
2 INJECTION, SOLUTION INTRAVENOUS EVERY 8 HOURS
Status: COMPLETED | OUTPATIENT
Start: 2025-02-24 | End: 2025-02-25

## 2025-02-24 RX ORDER — SODIUM CHLORIDE, SODIUM LACTATE, POTASSIUM CHLORIDE, CALCIUM CHLORIDE 600; 310; 30; 20 MG/100ML; MG/100ML; MG/100ML; MG/100ML
50 INJECTION, SOLUTION INTRAVENOUS CONTINUOUS
Status: ACTIVE | OUTPATIENT
Start: 2025-02-24 | End: 2025-02-25

## 2025-02-24 RX ORDER — GLYCOPYRROLATE 0.2 MG/ML
INJECTION INTRAMUSCULAR; INTRAVENOUS AS NEEDED
Status: DISCONTINUED | OUTPATIENT
Start: 2025-02-24 | End: 2025-02-24

## 2025-02-24 RX ADMIN — ACETAMINOPHEN 975 MG: 325 TABLET ORAL at 07:12

## 2025-02-24 RX ADMIN — ACETAMINOPHEN 650 MG: 325 TABLET ORAL at 12:50

## 2025-02-24 RX ADMIN — DOCUSATE SODIUM 100 MG: 100 CAPSULE, LIQUID FILLED ORAL at 20:48

## 2025-02-24 RX ADMIN — CEFAZOLIN SODIUM 2 G: 2 INJECTION, SOLUTION INTRAVENOUS at 15:06

## 2025-02-24 RX ADMIN — ACETAMINOPHEN 650 MG: 325 TABLET ORAL at 17:36

## 2025-02-24 RX ADMIN — ONDANSETRON 4 MG: 2 INJECTION, SOLUTION INTRAMUSCULAR; INTRAVENOUS at 09:08

## 2025-02-24 RX ADMIN — LIDOCAINE HYDROCHLORIDE 3 ML: 10 INJECTION, SOLUTION INFILTRATION; PERINEURAL at 09:03

## 2025-02-24 RX ADMIN — SODIUM CHLORIDE, POTASSIUM CHLORIDE, SODIUM LACTATE AND CALCIUM CHLORIDE 50 ML/HR: 600; 310; 30; 20 INJECTION, SOLUTION INTRAVENOUS at 12:10

## 2025-02-24 RX ADMIN — BUPIVACAINE HYDROCHLORIDE IN DEXTROSE 1.8 ML: 7.5 INJECTION, SOLUTION SUBARACHNOID at 09:03

## 2025-02-24 RX ADMIN — GLYCOPYRROLATE 0.2 MG: 0.2 INJECTION, SOLUTION INTRAMUSCULAR; INTRAVENOUS at 09:08

## 2025-02-24 RX ADMIN — GABAPENTIN 600 MG: 300 CAPSULE ORAL at 07:12

## 2025-02-24 RX ADMIN — TRANEXAMIC ACID 1950 MG: 650 TABLET ORAL at 15:04

## 2025-02-24 RX ADMIN — KETOROLAC TROMETHAMINE 15 MG: 30 INJECTION, SOLUTION INTRAMUSCULAR at 17:36

## 2025-02-24 RX ADMIN — GLYCOPYRROLATE 0.2 MG: 0.2 INJECTION, SOLUTION INTRAMUSCULAR; INTRAVENOUS at 09:50

## 2025-02-24 RX ADMIN — DEXAMETHASONE SODIUM PHOSPHATE: 10 INJECTION INTRAMUSCULAR; INTRAVENOUS at 08:00

## 2025-02-24 RX ADMIN — KETOROLAC TROMETHAMINE 15 MG: 30 INJECTION, SOLUTION INTRAMUSCULAR at 12:50

## 2025-02-24 RX ADMIN — CELECOXIB 200 MG: 200 CAPSULE ORAL at 07:12

## 2025-02-24 RX ADMIN — ROPIVACAINE HYDROCHLORIDE 40 MG: 2 INJECTION, SOLUTION EPIDURAL; INFILTRATION at 07:55

## 2025-02-24 RX ADMIN — CEFAZOLIN SODIUM 2 G: 2 INJECTION, SOLUTION INTRAVENOUS at 09:08

## 2025-02-24 RX ADMIN — SODIUM CHLORIDE, POTASSIUM CHLORIDE, SODIUM LACTATE AND CALCIUM CHLORIDE 50 ML/HR: 600; 310; 30; 20 INJECTION, SOLUTION INTRAVENOUS at 07:12

## 2025-02-24 RX ADMIN — SODIUM CHLORIDE, POTASSIUM CHLORIDE, SODIUM LACTATE AND CALCIUM CHLORIDE: 600; 310; 30; 20 INJECTION, SOLUTION INTRAVENOUS at 09:57

## 2025-02-24 RX ADMIN — MIDAZOLAM 2 MG: 1 INJECTION INTRAMUSCULAR; INTRAVENOUS at 08:00

## 2025-02-24 RX ADMIN — DOCUSATE SODIUM 100 MG: 100 CAPSULE, LIQUID FILLED ORAL at 12:50

## 2025-02-24 RX ADMIN — PROPOFOL 100 MCG/KG/MIN: 10 INJECTION, EMULSION INTRAVENOUS at 09:04

## 2025-02-24 RX ADMIN — TRANEXAMIC ACID 1950 MG: 650 TABLET ORAL at 07:12

## 2025-02-24 RX ADMIN — PHENYLEPHRINE HYDROCHLORIDE 100 MCG: 10 INJECTION INTRAVENOUS at 09:47

## 2025-02-24 RX ADMIN — OXYCODONE 5 MG: 5 TABLET ORAL at 20:49

## 2025-02-24 RX ADMIN — OXYCODONE 5 MG: 5 TABLET ORAL at 15:06

## 2025-02-24 RX ADMIN — SODIUM CHLORIDE, POTASSIUM CHLORIDE, SODIUM LACTATE AND CALCIUM CHLORIDE: 600; 310; 30; 20 INJECTION, SOLUTION INTRAVENOUS at 08:53

## 2025-02-24 RX ADMIN — ASPIRIN 81 MG: 81 TABLET, COATED ORAL at 20:49

## 2025-02-24 SDOH — ECONOMIC STABILITY: FOOD INSECURITY: WITHIN THE PAST 12 MONTHS, YOU WORRIED THAT YOUR FOOD WOULD RUN OUT BEFORE YOU GOT THE MONEY TO BUY MORE.: NEVER TRUE

## 2025-02-24 SDOH — SOCIAL STABILITY: SOCIAL INSECURITY: DO YOU FEEL UNSAFE GOING BACK TO THE PLACE WHERE YOU ARE LIVING?: NO

## 2025-02-24 SDOH — SOCIAL STABILITY: SOCIAL INSECURITY
WITHIN THE LAST YEAR, HAVE YOU BEEN KICKED, HIT, SLAPPED, OR OTHERWISE PHYSICALLY HURT BY YOUR PARTNER OR EX-PARTNER?: NO

## 2025-02-24 SDOH — ECONOMIC STABILITY: INCOME INSECURITY: IN THE PAST 12 MONTHS HAS THE ELECTRIC, GAS, OIL, OR WATER COMPANY THREATENED TO SHUT OFF SERVICES IN YOUR HOME?: NO

## 2025-02-24 SDOH — ECONOMIC STABILITY: FOOD INSECURITY: WITHIN THE PAST 12 MONTHS, THE FOOD YOU BOUGHT JUST DIDN'T LAST AND YOU DIDN'T HAVE MONEY TO GET MORE.: NEVER TRUE

## 2025-02-24 SDOH — SOCIAL STABILITY: SOCIAL INSECURITY
WITHIN THE LAST YEAR, HAVE YOU BEEN RAPED OR FORCED TO HAVE ANY KIND OF SEXUAL ACTIVITY BY YOUR PARTNER OR EX-PARTNER?: NO

## 2025-02-24 SDOH — SOCIAL STABILITY: SOCIAL INSECURITY: ARE THERE ANY APPARENT SIGNS OF INJURIES/BEHAVIORS THAT COULD BE RELATED TO ABUSE/NEGLECT?: NO

## 2025-02-24 SDOH — SOCIAL STABILITY: SOCIAL INSECURITY: ARE YOU OR HAVE YOU BEEN THREATENED OR ABUSED PHYSICALLY, EMOTIONALLY, OR SEXUALLY BY ANYONE?: NO

## 2025-02-24 SDOH — SOCIAL STABILITY: SOCIAL INSECURITY: DOES ANYONE TRY TO KEEP YOU FROM HAVING/CONTACTING OTHER FRIENDS OR DOING THINGS OUTSIDE YOUR HOME?: NO

## 2025-02-24 SDOH — SOCIAL STABILITY: SOCIAL INSECURITY: WITHIN THE LAST YEAR, HAVE YOU BEEN HUMILIATED OR EMOTIONALLY ABUSED IN OTHER WAYS BY YOUR PARTNER OR EX-PARTNER?: NO

## 2025-02-24 SDOH — SOCIAL STABILITY: SOCIAL INSECURITY: WERE YOU ABLE TO COMPLETE ALL THE BEHAVIORAL HEALTH SCREENINGS?: YES

## 2025-02-24 SDOH — SOCIAL STABILITY: SOCIAL INSECURITY: ABUSE: ADULT

## 2025-02-24 SDOH — SOCIAL STABILITY: SOCIAL INSECURITY: WITHIN THE LAST YEAR, HAVE YOU BEEN AFRAID OF YOUR PARTNER OR EX-PARTNER?: NO

## 2025-02-24 SDOH — SOCIAL STABILITY: SOCIAL INSECURITY: DO YOU FEEL ANYONE HAS EXPLOITED OR TAKEN ADVANTAGE OF YOU FINANCIALLY OR OF YOUR PERSONAL PROPERTY?: NO

## 2025-02-24 SDOH — SOCIAL STABILITY: SOCIAL INSECURITY: HAVE YOU HAD ANY THOUGHTS OF HARMING ANYONE ELSE?: NO

## 2025-02-24 SDOH — SOCIAL STABILITY: SOCIAL INSECURITY: HAVE YOU HAD THOUGHTS OF HARMING ANYONE ELSE?: NO

## 2025-02-24 SDOH — SOCIAL STABILITY: SOCIAL INSECURITY: HAS ANYONE EVER THREATENED TO HURT YOUR FAMILY OR YOUR PETS?: NO

## 2025-02-24 ASSESSMENT — ACTIVITIES OF DAILY LIVING (ADL)
DRESSING YOURSELF: INDEPENDENT
GROOMING: INDEPENDENT
PATIENT'S MEMORY ADEQUATE TO SAFELY COMPLETE DAILY ACTIVITIES?: YES
HEARING - RIGHT EAR: FUNCTIONAL
ASSISTIVE_DEVICE: EYEGLASSES;DENTURES PARTIAL
BATHING: INDEPENDENT
HEARING - LEFT EAR: FUNCTIONAL
JUDGMENT_ADEQUATE_SAFELY_COMPLETE_DAILY_ACTIVITIES: YES
LACK_OF_TRANSPORTATION: NO
FEEDING YOURSELF: INDEPENDENT
TOILETING: INDEPENDENT
ADEQUATE_TO_COMPLETE_ADL: YES
WALKS IN HOME: INDEPENDENT
LACK_OF_TRANSPORTATION: NO

## 2025-02-24 ASSESSMENT — COGNITIVE AND FUNCTIONAL STATUS - GENERAL
MOVING FROM LYING ON BACK TO SITTING ON SIDE OF FLAT BED WITH BEDRAILS: A LITTLE
WALKING IN HOSPITAL ROOM: A LITTLE
WALKING IN HOSPITAL ROOM: TOTAL
DRESSING REGULAR UPPER BODY CLOTHING: A LITTLE
TURNING FROM BACK TO SIDE WHILE IN FLAT BAD: A LITTLE
CLIMB 3 TO 5 STEPS WITH RAILING: TOTAL
MOVING TO AND FROM BED TO CHAIR: TOTAL
MOBILITY SCORE: 16
PATIENT BASELINE BEDBOUND: NO
DAILY ACTIVITIY SCORE: 19
HELP NEEDED FOR BATHING: A LITTLE
CLIMB 3 TO 5 STEPS WITH RAILING: TOTAL
TOILETING: A LOT
MOBILITY SCORE: 10
MOVING TO AND FROM BED TO CHAIR: A LITTLE
MOVING FROM LYING ON BACK TO SITTING ON SIDE OF FLAT BED WITH BEDRAILS: A LITTLE
TURNING FROM BACK TO SIDE WHILE IN FLAT BAD: A LITTLE
STANDING UP FROM CHAIR USING ARMS: TOTAL
STANDING UP FROM CHAIR USING ARMS: A LITTLE
DRESSING REGULAR LOWER BODY CLOTHING: A LITTLE

## 2025-02-24 ASSESSMENT — PAIN - FUNCTIONAL ASSESSMENT
PAIN_FUNCTIONAL_ASSESSMENT: 0-10

## 2025-02-24 ASSESSMENT — PAIN DESCRIPTION - DESCRIPTORS: DESCRIPTORS: ACHING

## 2025-02-24 ASSESSMENT — LIFESTYLE VARIABLES
HOW OFTEN DO YOU HAVE A DRINK CONTAINING ALCOHOL: NEVER
AUDIT-C TOTAL SCORE: 0
HOW OFTEN DO YOU HAVE 6 OR MORE DRINKS ON ONE OCCASION: NEVER
AUDIT-C TOTAL SCORE: 0
SKIP TO QUESTIONS 9-10: 1
HOW MANY STANDARD DRINKS CONTAINING ALCOHOL DO YOU HAVE ON A TYPICAL DAY: PATIENT DOES NOT DRINK

## 2025-02-24 ASSESSMENT — PAIN SCALES - GENERAL
PAINLEVEL_OUTOF10: 0 - NO PAIN
PAINLEVEL_OUTOF10: 0 - NO PAIN
PAINLEVEL_OUTOF10: 3
PAINLEVEL_OUTOF10: 0 - NO PAIN
PAINLEVEL_OUTOF10: 3
PAINLEVEL_OUTOF10: 0 - NO PAIN
PAIN_LEVEL: 0
PAINLEVEL_OUTOF10: 2
PAINLEVEL_OUTOF10: 4

## 2025-02-24 ASSESSMENT — COLUMBIA-SUICIDE SEVERITY RATING SCALE - C-SSRS
2. HAVE YOU ACTUALLY HAD ANY THOUGHTS OF KILLING YOURSELF?: NO
6. HAVE YOU EVER DONE ANYTHING, STARTED TO DO ANYTHING, OR PREPARED TO DO ANYTHING TO END YOUR LIFE?: NO
1. IN THE PAST MONTH, HAVE YOU WISHED YOU WERE DEAD OR WISHED YOU COULD GO TO SLEEP AND NOT WAKE UP?: NO

## 2025-02-24 NOTE — PROGRESS NOTES
"Physical Therapy    Physical Therapy Evaluation    Patient Name: Karly Milton \"DAYANNA\"  MRN: [id number]  Today's Date: 2/24/2025   Time Calculation  Start Time: 1435  Stop Time: 1525  Time Calculation (min): 50 min  610/610-A    Assessment/Plan   PT Assessment  PT Assessment Results: Decreased strength, Decreased range of motion, Decreased endurance, Impaired balance, Decreased mobility, Decreased coordination, Pain  Rehab Prognosis: Good  Evaluation/Treatment Tolerance: Patient limited by fatigue, Patient limited by pain  End of Session Communication: Bedside nurse  Assessment Comment: pt with decreased mobility/gait strength balance, endurance and ROM . pt to benefit from skilled PT to address deficits and improve functional mobility .  End of Session Patient Position: Up in chair, Alarm on  IP OR SWING BED PT PLAN  Inpatient or Swing Bed: Inpatient  PT Plan  Treatment/Interventions: Bed mobility, Transfer training, Gait training, Stair training, Balance training, Strengthening, Endurance training, Range of motion, Therapeutic exercise, Therapeutic activity, Home exercise program  PT Plan: Ongoing PT  PT Frequency: BID  PT Discharge Recommendations: Low intensity level of continued care  Equipment Recommended upon Discharge: Wheeled walker (pt to benefit from FWW issues to pt. has  old walker . states its 15 + years old.)  PT Recommended Transfer Status: Assist x1, Assistive device  PT - OK to Discharge: Yes    Subjective     Current Problem:  1. Unilateral primary osteoarthritis, right knee          Patient Active Problem List   Diagnosis    PVC (premature ventricular contraction)    Primary hypertension    Arthritis of knee    ASHD (arteriosclerotic heart disease)    Cervical radiculopathy, chronic    Presbylarynges    Dysphonia    Hyperlipidemia    Palpitations    Peripheral neuropathy    Medicare annual wellness visit, subsequent    BMI 37.0-37.9, adult    Former smoker    Unilateral primary osteoarthritis, " right knee    Preoperative clearance    Chronic pain of right knee    S/P total knee replacement, right     General Visit Information:  General  Reason for Referral: S/P R TKA  Referred By: OT/PT Dr Alex Mon 2/24  Past Medical History Relevant to Rehab: HLD,HTN,Neuropathy,OA,GERD,ASHD  Prior to Session Communication: Bedside nurse  Patient Position Received: Bed, 3 rail up, Alarm on  General Comment: pt is a   73 yo female  came to the hospital  today for an elective R TKA with Dr. Norris. pt currently WBAT R LE .  Home Living:  Home Living  Home Living Comments: pt lives at home  alone in a 2 level home with 0 steps to enter    pt bed and bath on 2nd floor flight of steps with rail to reach .  has a walk in shower with grab bars .  pt has half/ bath on 1st floor  Prior Level of Function:  Prior Function Per Pt/Caregiver Report  Prior Function Comments: per pt IND with all mobility,adls and iadls.  has a FWW,. no falls still drives. retired  Precautions:  Precautions  LE Weight Bearing Status: Weight Bearing as Tolerated  Medical Precautions: Fall precautions  Objective   Pain:  Pain Assessment  Pain Assessment: 0-10  0-10 (Numeric) Pain Score: 2  Pain Type: Surgical pain  Pain Location: Knee  Pain Orientation: Right  Cognition:  Cognition  Overall Cognitive Status: Within Functional Limits  General Assessments:  Activity Tolerance  Endurance: Tolerates less than 10 min exercise with changes in vital signs  Sensation  Sensation Comment: intact BLEs  Coordination  Movements are Fluid and Coordinated: Yes  Static Sitting Balance  Static Sitting-Comment/Number of Minutes: fair  Dynamic Sitting Balance  Dynamic Sitting-Comments: fair  Static Standing Balance  Static Standing-Comment/Number of Minutes: fair  Dynamic Standing Balance  Dynamic Standing-Comments: fair  Functional Assessments:  Bed Mobility  Bed Mobility: Yes  Bed Mobility 1  Bed Mobility 1: Supine to sitting, Scooting  Level of Assistance 1: Minimum  assistance  Bed Mobility Comments 1: min A to EOB  Transfers  Transfer: Yes  Transfer 1  Technique 1: Sit to stand, Stand to sit  Transfer Device 1: Walker  Transfer Level of Assistance 1: Minimum assistance  Trials/Comments 1: cues to push up and reach back with transfers . immobilizer on R LE  Ambulation/Gait Training  Ambulation/Gait Training Performed: Yes  Ambulation/Gait Training 1  Surface 1: Level tile  Device 1: Rolling walker  Gait Support Devices: Knee immobilizer  Assistance 1: Contact guard  Quality of Gait 1: Decreased step length, Inconsistent stride length  Comments/Distance (ft) 1: 100ft x 2 with FWW  MIN A /cga immobilizer on  R knee .  Extremity/Trunk Assessments:  RLE   RLE : Exceptions to WFL  AROM RLE (degrees)  R Knee Flexion 0-130: 85  R Knee Extension 0-130: -3  Strength RLE  R Hip Flexion: 3+/5  R Hip Extension: 3+/5  R Hip ABduction: 3+/5  R Hip ADduction: 3+/5  R Knee Flexion: 3+/5  R Knee Extension: 3+/5  R Ankle Dorsiflexion: 4/5  R Ankle Plantar Flexion: 4/5  LLE   LLE : Within Functional Limits (MMT 5/5)  Outcome Measures:   [facility] Basic Mobility  Turning from your back to your side while in a flat bed without using bedrails: A little  Moving from lying on your back to sitting on the side of a flat bed without using bedrails: A little  Moving to and from bed to chair (including a wheelchair): A little  Standing up from a chair using your arms (e.g. wheelchair or bedside chair): A little  To walk in hospital room: A little  Climbing 3-5 steps with railing: Total  Basic Mobility - Total Score: 16      Treatment : supine thera ex 2x10 BLEs  ankle pump, quad sets, glute sets, SAQs, SLR. Heel slides.   Goals:  Encounter Problems       Encounter Problems (Active)       PT Problem       Pt will demonstrate mod I  with bed mobility to edge of bed.         Start:  02/24/25    Expected End:  03/03/25            Pt will demonstrate mod I  with sit to stand/chair transfers with FWW.         Start:   02/24/25    Expected End:  03/03/25            Pt will ambulate 150 feet with FWW MOD I .         Start:  02/24/25    Expected End:  03/03/25            Pt to demo flight of steps with  rails with SUP         Start:  02/24/25    Expected End:  03/03/25            Pt to demo 0-100 ROM of R  Knee         Start:  02/24/25    Expected End:  03/03/25            Patient will demonstrate independence in home program for support of progression       Start:  02/24/25    Expected End:  03/03/25                 Education Documentation  Home Exercise Program, taught by Keshawn Peraza, PT at 2/24/2025  3:44 PM.  Learner: Patient  Readiness: Acceptance  Method: Explanation  Response: Verbalizes Understanding    Mobility Training, taught by Keshawn Peraza, PT at 2/24/2025  3:44 PM.  Learner: Patient  Readiness: Acceptance  Method: Explanation  Response: Verbalizes Understanding    Education Comments  No comments found.

## 2025-02-24 NOTE — ANESTHESIA POSTPROCEDURE EVALUATION
"Patient: Karly Milton \"DAYANNA\"    Procedure Summary       Date: 02/24/25 Room / Location: [facility] / [facility]    Anesthesia Start: 0853 Anesthesia Stop:     Procedure: RESURFACING ARTHROPLASTY, KNEE, TOTAL (Right: Knee) Diagnosis:       Unilateral primary osteoarthritis, right knee      (Unilateral primary osteoarthritis, right knee [M17.11])    Surgeons: Miles Mon MD Responsible Provider: Colby Lovelace DO    Anesthesia Type: spinal ASA Status: 2            Anesthesia Type: spinal    Vitals Value Taken Time   /53 02/24/25 1135   Temp 36.4 °C (97.5 °F) 02/24/25 1121   Pulse 66 02/24/25 1136   Resp 12 02/24/25 1136   SpO2 97 % 02/24/25 1136   Vitals shown include unfiled device data.    Anesthesia Post Evaluation    Patient location during evaluation: PACU  Patient participation: complete - patient cannot participate  Level of consciousness: awake and awake and alert  Pain score: 0  Pain management: adequate  Airway patency: patent  Cardiovascular status: hemodynamically stable  Respiratory status: acceptable, spontaneous ventilation and face mask  Hydration status: acceptable  Postoperative Nausea and Vomiting: none        No notable events documented.    "

## 2025-02-24 NOTE — ANESTHESIA PROCEDURE NOTES
Peripheral Block    Patient location during procedure: pre-op  Start time: 2/24/2025 8:00 AM  End time: 2/24/2025 8:10 AM  Reason for block: at surgeon's request and post-op pain management  Staffing  Performed: attending   Authorized by: Colby Lovelace DO    Performed by: Colby Lovelace DO  Preanesthetic Checklist  Completed: patient identified, IV checked, site marked, risks and benefits discussed, surgical consent, monitors and equipment checked, pre-op evaluation and timeout performed   Timeout performed at:   Peripheral Block  Patient position: laying flat  Prep: ChloraPrep  Patient monitoring: heart rate, continuous pulse ox and cardiac monitor  Block type: IPACK  Laterality: right  Injection technique: single-shot  Guidance: ultrasound guided  Local infiltration: lidocaine  Infiltration strength: 1 %  Dose: 2 mL  Needle  Needle gauge: 21 G  Needle length: 10 cm  Needle localization: ultrasound guidance     image stored in chart  Assessment  Injection assessment: negative aspiration for heme, no paresthesia on injection, incremental injection and local visualized surrounding nerve on ultrasound  Heart rate change: no  Additional Notes  Time out performed. 20 ml .2 % Ropivacaine used in divided doses. Patient tolerated procedure well.

## 2025-02-24 NOTE — ANESTHESIA PREPROCEDURE EVALUATION
"Patient: Karly Milton \"DAYANNA\"    Procedure Information       Date/Time: 02/24/25 0750    Procedure: RESURFACING ARTHROPLASTY, KNEE, TOTAL (Right: Knee) - NERVE BLOCK  IODINE, ADHESIVE/TAPE and NICKEL allergy  MITCH NICKEL FREE    Location: [facility] / [facility]    Surgeons: Miles Mon MD            Relevant Problems   Cardiac   (+) ASHD (arteriosclerotic heart disease)   (+) Hyperlipidemia   (+) PVC (premature ventricular contraction)   (+) Primary hypertension      Neuro   (+) Cervical radiculopathy, chronic   (+) Peripheral neuropathy      Musculoskeletal   (+) Unilateral primary osteoarthritis, right knee       Clinical information reviewed:    Allergies  Meds               NPO Detail:  NPO/Void Status  Carbohydrate Drink Given Prior to Surgery? : N  Date of Last Liquid: 02/23/25  Time of Last Liquid: 0000  Date of Last Solid: 02/23/25  Time of Last Solid: 2200  Last Intake Type: Clear fluids  Time of Last Void: 0400         Physical Exam    Airway  Mallampati: II     Cardiovascular   Rhythm: regular  Rate: normal     Dental    Pulmonary - normal exam     Abdominal - normal exam             Anesthesia Plan    History of general anesthesia?: yes  History of complications of general anesthesia?: no    ASA 2     spinal, MAC and regional     intravenous induction   Postoperative administration of opioids is intended.  Anesthetic plan and risks discussed with patient.      "

## 2025-02-24 NOTE — ANESTHESIA PROCEDURE NOTES
Spinal Block    Start time: 2/24/2025 9:56 AM  End time: 2/24/2025 10:02 AM  Reason for block: primary anesthetic  Staffing  Performed: CRNA   Authorized by: Colby Lovelace DO    Performed by: VLADIMIR Piper    Preanesthetic Checklist  Completed: patient identified, IV checked, risks and benefits discussed, surgical consent, pre-op evaluation, timeout performed and sterile techniques followed  Block Timeout  RN/Licensed healthcare professional reads aloud to the Anesthesia provider and entire team: Patient identity, procedure with side and site, patient position, and as applicable the availability of implants/special equipment/special requirements.    Timeout performed at: 2/24/2025 9:55 AM  Spinal Block  Prep: ChloraPrep  Sedation level: no sedation  Patient monitoring: continuous pulse oximetry and EKG  Approach: midline  Vertebral space: L3-4  Injection technique: single-shot  Needle  Needle type: pencil-point   Needle length: 4 in  Free flowing CSF: yes    Assessment  Procedure assessment: patient tolerated procedure well with no immediate complications

## 2025-02-24 NOTE — PROGRESS NOTES
Patient admitted postoperatively for RTKA.     She has no complaints of pain at this time. She has very little sensation to operative extremity and is not able to dorsiflex or plantar flex at this time. This will improve as the block wears off. Will continue to monitor.     Plan of care and discharge discussed with patient. She is requesting SNF at [facility] at discharge. TCC updated on patient request.     She may WBAT to operative extremity with use of walker for assistance with ambulation.   She will start ASA 81 mg twice a day this evening for DVT prophylaxis and take for 30 days. She then can resume daily ASA after 30 days.     Home medications reviewed and resumed as appropriate for this admission.

## 2025-02-24 NOTE — CARE PLAN
Problem: Pain - Adult  Goal: Verbalizes/displays adequate comfort level or baseline comfort level  Outcome: Progressing     Problem: Safety - Adult  Goal: Free from fall injury  Outcome: Progressing     Problem: Discharge Planning  Goal: Discharge to home or other facility with appropriate resources  Outcome: Progressing     Problem: Chronic Conditions and Co-morbidities  Goal: Patient's chronic conditions and co-morbidity symptoms are monitored and maintained or improved  Outcome: Progressing     Problem: Nutrition  Goal: Nutrient intake appropriate for maintaining nutritional needs  Outcome: Progressing     Problem: Skin  Goal: Decreased wound size/increased tissue granulation at next dressing change  Outcome: Progressing  Goal: Participates in plan/prevention/treatment measures  Outcome: Progressing  Goal: Prevent/manage excess moisture  Outcome: Progressing  Goal: Prevent/minimize sheer/friction injuries  Outcome: Progressing  Flowsheets (Taken 2/24/2025 1845)  Prevent/minimize sheer/friction injuries: Use pull sheet  Goal: Promote/optimize nutrition  Outcome: Progressing  Goal: Promote skin healing  Outcome: Progressing     Problem: Pain  Goal: Takes deep breaths with improved pain control throughout the shift  Outcome: Progressing  Goal: Turns in bed with improved pain control throughout the shift  Outcome: Progressing  Goal: Walks with improved pain control throughout the shift  Outcome: Progressing  Goal: Performs ADL's with improved pain control throughout shift  Outcome: Progressing  Goal: Participates in PT with improved pain control throughout the shift  Outcome: Progressing  Goal: Free from opioid side effects throughout the shift  Outcome: Progressing  Goal: Free from acute confusion related to pain meds throughout the shift  Outcome: Progressing     Problem: Fall/Injury  Goal: Not fall by end of shift  Outcome: Progressing  Goal: Be free from injury by end of the shift  Outcome: Progressing  Goal:  Verbalize understanding of personal risk factors for fall in the hospital  Outcome: Progressing  Goal: Verbalize understanding of risk factor reduction measures to prevent injury from fall in the home  Outcome: Progressing  Goal: Use assistive devices by end of the shift  Outcome: Progressing  Goal: Pace activities to prevent fatigue by end of the shift  Outcome: Progressing

## 2025-02-24 NOTE — PROGRESS NOTES
02/24/25 1545   Discharge Planning   Living Arrangements Alone   Support Systems Children   Assistance Needed none, PTA independent ADLS and IADLS no AD, drives, denies falls. Owns walker (was husbands that is 15 + years old), walk-in shower with 2 seats   Type of Residence Private residence  (2 level home, bedroom/full bathroom upstairs, 1/2 bath on 1st floor)   Number of Stairs to Enter Residence 0   Number of Stairs Within Residence 12   Do you have animals or pets at home? No   Home or Post Acute Services In home services;Post acute facilities (Rehab/SNF/etc)   Type of Home Care Services Home OT;Home PT   Expected Discharge Disposition Home H   Does the patient need discharge transport arranged? No   Financial Resource Strain   How hard is it for you to pay for the very basics like food, housing, medical care, and heating? Not hard   Housing Stability   In the last 12 months, was there a time when you were not able to pay the mortgage or rent on time? N   In the past 12 months, how many times have you moved where you were living? 0   At any time in the past 12 months, were you homeless or living in a shelter (including now)? N   Transportation Needs   In the past 12 months, has lack of transportation kept you from medical appointments or from getting medications? no   In the past 12 months, has lack of transportation kept you from meetings, work, or from getting things needed for daily living? No   Patient Choice   Provider Choice list and CMS website (https://medicare.gov/care-compare#search) for post-acute Quality and Resource Measure Data were provided and reviewed with: Patient   Patient / Family choosing to utilize agency / facility established prior to hospitalization No   Stroke Family Assessment   Stroke Family Assessment Needed No   Intensity of Service   Intensity of Service 0-30 min     Pt is s/p Elective Right total knee arthoplasty 2/24/25 with Dr. Miles Mon. Pt is weight bearing as  tolerated to operative extremity with use of walker for assistance with ambulation. Pt will discharge on Aspirin 81 mg PO BID x 30 days for DVT prophylaxis and then can resume daily Aspirin after 30 days. [facility] scores are PT (16) OT (pending) recommending continued therapy at low level/intensity + new FWW as pts was her late husbands and is 15 + years old. Pt had initially wanted to go to [facility] for rehab due to lives alone and steps to bed/bath, explained to pt Medicare criteria of 3 midnight IP rule. Pt states that she did just work with therapy and did well walking down souza, if SNF isn't an option then would prefer home with [facility] and agency of choice is [facility]. If pt ends up going home she states her adult children are available and can assist as needed. Demographics verified. Will revisit in morning to see if pt continues to progress with therapy and what status is on IP vs extended recovery. CT team will continue monitoring case for progression and DC planning.

## 2025-02-24 NOTE — ANESTHESIA PROCEDURE NOTES
Peripheral Block    Patient location during procedure: pre-op  Start time: 2/24/2025 8:00 AM  End time: 2/24/2025 8:10 AM  Reason for block: at surgeon's request and post-op pain management  Staffing  Performed: attending   Authorized by: Colby Lovelace DO    Performed by: Colby Lovelace DO  Preanesthetic Checklist  Completed: patient identified, IV checked, site marked, risks and benefits discussed, surgical consent, monitors and equipment checked, pre-op evaluation and timeout performed   Timeout performed at:   Peripheral Block  Patient position: laying flat  Prep: ChloraPrep  Patient monitoring: heart rate, continuous pulse ox and cardiac monitor  Block type: adductor canal  Laterality: right  Injection technique: single-shot  Guidance: ultrasound guided  Local infiltration: lidocaine  Infiltration strength: 1 %  Dose: 2 mL  Needle  Needle gauge: 21 G  Needle length: 10 cm  Needle localization: ultrasound guidance     image stored in chart  Assessment  Injection assessment: negative aspiration for heme, no paresthesia on injection, incremental injection and local visualized surrounding nerve on ultrasound  Heart rate change: no  Additional Notes  Time out performed. 20 ml .5 % Ropivacaine with 5mg Decadron used in divided doses. Patient tolerated procedure well.

## 2025-02-24 NOTE — INTERVAL H&P NOTE
History and physical is reviewed, patient re evaluated, no changes.  Procedure, risks, benefits, and postoperative course were discussed with the patient and consent is reviewed.    Miles Mon MD

## 2025-02-24 NOTE — DISCHARGE INSTRUCTIONS
Total Knee Replacement  Discharge Instructions    To prevent Clot formation, you have been placed on the following medication:  ASA 81 mg twice a day for 30 days started on 2/24/25.  Surgical Site Care:  .Change dressing once a day and PRN (as needed). Apply 4 x 4 sponge and light tape. If glue present, leave open to air.  You may leave wound open to air after initial dressing removal, if wound is clean, dry and intact  If Aquacel Ag dressing is present, do not remove dressing for 7 days, unless heavily saturated. If heavily saturated, remove dressing and start using instructions above  Staples will be removed on post-operative day 14 and steri-strips applied  Showering is permitted starting POD1 if waterproof Aquacel dressing is present or when the incision is covered with 4 x 4 and Tegaderm waterproof dressing  Until all areas of incision are healed.    Physical Therapy:  Weight Bearing Status:  WBAT  Precautions, Per Physical Therapy Handout  Pain Medications  You were given  oxycodone  Wean off pain medications as you deem appropriate as long as pain is under control  Cold packs/Ice packs/Machine  May be used 3 times daily for 15-30 minutes as necessary  Be sure to have a barrier (cloth, clothing, towel) between the site and the ice pack to prevent frostbite  Contact [facility] office if  Increased redness, swelling, drainage of any kind, and/or pain to surgery site.  As well as new onset fevers and or chills.  These could signify an infection.  Calf or thigh tenderness to touch as well as increased swelling or redness.  This could signify a clot formation.  Numbness or tingling to an area around the incision site or below the incision site (toes).  Any rash appears, increased  or new onset nausea/vomiting occur.  This may indicate a reaction to a medication.  Phone # [phone].  Follow up with Surgeon   I acknowledge that I have received pavel hose and understand the instructions on how and when  to wear them (on during the day, off at night)   Discharging RN who has gone over instructions and acknowledges lora hose have been received   Ice 5 times a day for 20 minutes each session to operative extremity for two weeks.   LORA hose to be worn for three weeks. Can be removed for skin care and hygiene.   Incentive spirometer 10 times every hour while awake for two weeks.     Given Motrin this morning, can have again anytime after 1:00 pm

## 2025-02-24 NOTE — OP NOTE
"RESURFACING ARTHROPLASTY, KNEE, TOTAL (R) Operative Note     Date: 2025  OR Location: [facility]    Name: Karly Milton \"DAYANNA\", : 1950, Age: 74 y.o., MRN: [id number], Sex: female    Diagnosis  Pre-op Diagnosis      * Unilateral primary osteoarthritis, right knee [M17.11] Post-op Diagnosis     * Unilateral primary osteoarthritis, right knee [M17.11]     Procedures  RESURFACING ARTHROPLASTY, KNEE, TOTAL  17939 - NV ARTHRP KNE CONDYLE&PLATU MEDIAL&LAT COMPARTMENTS      Surgeons      * Miles Mon - Primary    Resident/Fellow/Other Assistant:  Surgeons and Role:     * Yimi Joseph PA-C - Assisting    Staff:   Scrub Person: Zelda  Circulator: Zoey    Anesthesia Staff: Anesthesiologist: Colby Lovelace DO  CRNA: CHINO Piper-CRNA    Procedure Summary  Anesthesia: Spinal  ASA: II  Estimated Blood Loss: minmL  Intra-op Medications:   Administrations occurring from 0750 to 0950 on 25:   Medication Name Total Dose   ropivacaine-epinephrine-clonidine-ketorolac 2.46-0.005- 0.0008-0.3mg/mL periarticular syringe 100 mL   sodium chloride 0.9 % irrigation solution 1,000 mL   bupivacaine PF 0.75 %-dextrose 8.25 % (Sensorcaine) intrathecal 1.8 mL   glycopyrrolate (Robinul) injection 0.2 mg/mL 0.2 mg   lactated Ringer's infusion Cannot be calculated   lidocaine (Xylocaine) 1 % 3 mL   midazolam (Versed) 1 mg/1 mL 2 mg   ondansetron 2 mg/mL 4 mg   phenylephrine (Abhinav-Synephrine) injection 100 mcg   propofol (Diprivan) infusion 10 mg/mL 360.64 mg   ceFAZolin (Ancef) 2 g in dextrose (iso)  mL 2 g   dexAMETHasone (PF) (Decadron) 5 mg, ropivacaine (Naropin) 5 mg/mL (0.5 %) 100 mg injection Cannot be calculated   ropivacaine (Naropin) 0.2 % injection solution 40 mg 40 mg              Anesthesia Record               Intraprocedure I/O Totals          Intake    Propofol Drip 0.00 mL    The total shown is the total volume documented since Anesthesia Start was filed.    lactated Ringer's infusion " "84.17 mL    Total Intake 84.17 mL          Specimen: No specimens collected              Drains and/or Catheters: * None in log *    Tourniquet Times:   * Missing tourniquet times found for documented tourniquets in lo *     Implants:  Implants       Type Name Action Serial No.       SIZE 8, PERSONA CR PERSONALIZED KNEE FEMUR, CEMENTED, TI-NIDIUM TIVANIUM NITRATED, STANDARD, RIGHT Implanted       RIGHT, 10MM 8-11 EF, PERSONA MEDIAL CONGRUENT ARTICULATING SURFACE, TIBIAL INSERT, VIVACIT-E Implanted      Joint Knee PATELLA, ALL POLY VE, 32MM - [id number] Implanted      Joint Knee CEMENT, BONE, BIOMET R, 1X40 - [id number] Implanted               Findings: see procedure details    Indications: Karly Milton \"DAYANNA\" is an 74 y.o. female who is having surgery for Unilateral primary osteoarthritis, right knee [M17.11].     The patient was seen in the preoperative area. The risks, benefits, complications, treatment options, non-operative alternatives, expected recovery and outcomes were discussed with the patient. The possibilities of reaction to medication, pulmonary aspiration, injury to surrounding structures, bleeding, recurrent infection, the need for additional procedures, failure to diagnose a condition, and creating a complication requiring transfusion or operation were discussed with the patient. The patient concurred with the proposed plan, giving informed consent.  The site of surgery was properly noted/marked if necessary per policy. The patient has been actively warmed in preoperative area. Preoperative antibiotics have been ordered and given within 1 hours of incision. Venous thrombosis prophylaxis have been ordered.    Procedure Details:   Preoperative diagnosis DJD knee right  Postoperative diagnosis same  Procedure total knee replacement    Primary surgeon Miles Mejiat. Sanjiv STRINGER certified    Anesthesia spinal with nerve block    Implants Selena nickel free  Femoral component " size8 r cr  Tibial component sizef  Patella size32  Insert 10mc          This patient has progressive knee pain which has been refractory to conservative treatment.  The patient has decided to undergo elective total knee replacement.  The risks, benefits, outcomes and postoperative course were fully explained to the patient.  We discussed wear, loosening, blood clot infection, nerve damage, numbness and tingling, failure of the procedure, stiffness, loss of life, loss of limb, and the need for possible revision surgery.  The patient understands the procedure and consents to surgical intervention.    The patient has pain in the knee that is increased with activity and weightbearing, and walks with an antalgic gait.  The pain is interfering with activities of daily living.  The patient has limited range of motion and pain with passive range of motion.  X-rays demonstrate joint space narrowing, subchondral sclerosis and osteophyte formation.  Symptoms are not improving with medication, therapy or supportive device for a period of at least 3 months.    The physician assistant was present through the entire case.  Given the nature of the disease process and the procedure to be performed skilled surgical first assistant was necessary during the case.  The assistant was necessary to hold retractors and manipulate the extremity during the procedure.  A certified scrub tech was at the back table managing the instruments and supplies for the surgical case.      The patient was taken to the operating room and surgical timeout was performed.  The patient received preoperative antibiotics.  The history and physical was reviewed and signed and updated.    After satisfactory anesthetic, the appropriate knee was prepped and draped in the usual sterile fashion.  The lower extremity was exsanguinated using an Esmarch bandage.  And the tourniquet was inflated to 250 mmHg.  A medial parapatellar incision was made and dissection was  carried sharply through the skin and underlying fat to the fascial tissue.  A medial capsular incision was made and the joint was entered.  There was a modest amount of synovial fluid, the synovium was hypertrophic and there were severe degenerative changes within the knee joint.    A drill hole was made in the center of the distal femur and the intramedullary guide was inserted.  The cutting jig was then inserted with 3ï¿½ of external rotation and the distal femoral cut was performed.  The jig and mary were removed and the appropriate-sized template was inserted.  Anterior posterior and chamfer cuts were made without difficulty.    At this point the tibia was addressed meniscal remnants were excised.  Medial and lateral tibial collateral retractors were inserted as was the posterior retractor.  Using the extra medullary alignment device the tibial resection was made 2 mm below the most efficient tibial bone surface.  A trial tibial component was then pinned in position proximal reaming and cruciate punch were performed.  With the trial in position the femoral trial was then inserted as well as an appropriately sized polyethylene insert.  The knee was placed through range of motion and stability was assessed.    Patella was then reamed and peg holes were drilled.  The trial patella was then applied.  The knee was again placed through a range of motion patella tracking was noted to be stable.    All trial, which were then removed the bony surfaces were irrigated with copious amount of pulsatile irrigation.  An final components were cemented in position.  All marginal cement was removed and the knee was placed in full extension while the cement hardened.    The capsule was then closed using #1 Vicryl suture in interrupted figure-of-eight manner.  Underlying subcutaneous tissue were closed using 3-0 Monocryl suture in the subcuticular stitch was performed using 4-0 Monocryl.  Skin glue was then used for the surgical  incision and an Aquacell dressing was then applied.  A dry sterile bulky dressing was applied to the lower extremity and the Tourniquet was deflated.    The patient tolerated the procedure well and was returned to the post anesthesia recovery room in satisfactory condition.  A postoperative x-ray was reviewed and the findings at surgery were discussed with the patient's family.      This note was prepared using voice recognition software.  The details of this note are correct and have been reviewed, and corrected to the best of my ability.  Some grammatical areas may persist related to the Dragon software    Miles Mon MD    [phone]    Complications:  None; patient tolerated the procedure well.    Disposition: PACU - hemodynamically stable.  Condition: stable         Miles Mon  Phone Number: [phone]

## 2025-02-25 ENCOUNTER — DOCUMENTATION (OUTPATIENT)
Dept: HOME HEALTH SERVICES | Facility: HOME HEALTH | Age: 75
End: 2025-02-25
Payer: MEDICARE

## 2025-02-25 ENCOUNTER — PHARMACY VISIT (OUTPATIENT)
Dept: PHARMACY | Facility: CLINIC | Age: 75
End: 2025-02-25
Payer: COMMERCIAL

## 2025-02-25 ENCOUNTER — HOME HEALTH ADMISSION (OUTPATIENT)
Dept: HOME HEALTH SERVICES | Facility: HOME HEALTH | Age: 75
End: 2025-02-25
Payer: MEDICARE

## 2025-02-25 VITALS
OXYGEN SATURATION: 96 % | RESPIRATION RATE: 18 BRPM | HEIGHT: 67 IN | BODY MASS INDEX: 35.5 KG/M2 | TEMPERATURE: 97 F | WEIGHT: 226.19 LBS | DIASTOLIC BLOOD PRESSURE: 61 MMHG | SYSTOLIC BLOOD PRESSURE: 143 MMHG | HEART RATE: 53 BPM

## 2025-02-25 PROBLEM — M17.11 UNILATERAL PRIMARY OSTEOARTHRITIS, RIGHT KNEE: Status: RESOLVED | Noted: 2024-11-18 | Resolved: 2025-02-25

## 2025-02-25 LAB
ANION GAP SERPL CALC-SCNC: 11 MMOL/L (ref 10–20)
BUN SERPL-MCNC: 22 MG/DL (ref 6–23)
CALCIUM SERPL-MCNC: 8.7 MG/DL (ref 8.6–10.3)
CHLORIDE SERPL-SCNC: 104 MMOL/L (ref 98–107)
CO2 SERPL-SCNC: 24 MMOL/L (ref 21–32)
CREAT SERPL-MCNC: 0.87 MG/DL (ref 0.5–1.05)
EGFRCR SERPLBLD CKD-EPI 2021: 69 ML/MIN/1.73M*2
ERYTHROCYTE [DISTWIDTH] IN BLOOD BY AUTOMATED COUNT: 12.4 % (ref 11.5–14.5)
GLUCOSE SERPL-MCNC: 133 MG/DL (ref 74–99)
HCT VFR BLD AUTO: 32.2 % (ref 36–46)
HGB BLD-MCNC: 11.6 G/DL (ref 12–16)
MCH RBC QN AUTO: 30.9 PG (ref 26–34)
MCHC RBC AUTO-ENTMCNC: 36 G/DL (ref 32–36)
MCV RBC AUTO: 86 FL (ref 80–100)
NRBC BLD-RTO: 0 /100 WBCS (ref 0–0)
PLATELET # BLD AUTO: 175 X10*3/UL (ref 150–450)
POTASSIUM SERPL-SCNC: 4.2 MMOL/L (ref 3.5–5.3)
RBC # BLD AUTO: 3.75 X10*6/UL (ref 4–5.2)
SODIUM SERPL-SCNC: 135 MMOL/L (ref 136–145)
WBC # BLD AUTO: 12.4 X10*3/UL (ref 4.4–11.3)

## 2025-02-25 PROCEDURE — 97110 THERAPEUTIC EXERCISES: CPT | Mod: GP,CQ

## 2025-02-25 PROCEDURE — 97165 OT EVAL LOW COMPLEX 30 MIN: CPT | Mod: GO

## 2025-02-25 PROCEDURE — 7100000011 HC EXTENDED STAY RECOVERY HOURLY - NURSING UNIT

## 2025-02-25 PROCEDURE — 2500000002 HC RX 250 W HCPCS SELF ADMINISTERED DRUGS (ALT 637 FOR MEDICARE OP, ALT 636 FOR OP/ED): Performed by: ORTHOPAEDIC SURGERY

## 2025-02-25 PROCEDURE — RXMED WILLOW AMBULATORY MEDICATION CHARGE

## 2025-02-25 PROCEDURE — 97530 THERAPEUTIC ACTIVITIES: CPT | Mod: GP,CQ

## 2025-02-25 PROCEDURE — 85027 COMPLETE CBC AUTOMATED: CPT | Performed by: ORTHOPAEDIC SURGERY

## 2025-02-25 PROCEDURE — 97535 SELF CARE MNGMENT TRAINING: CPT | Mod: GO

## 2025-02-25 PROCEDURE — 2500000001 HC RX 250 WO HCPCS SELF ADMINISTERED DRUGS (ALT 637 FOR MEDICARE OP): Performed by: ORTHOPAEDIC SURGERY

## 2025-02-25 PROCEDURE — 36415 COLL VENOUS BLD VENIPUNCTURE: CPT | Performed by: ORTHOPAEDIC SURGERY

## 2025-02-25 PROCEDURE — 2500000004 HC RX 250 GENERAL PHARMACY W/ HCPCS (ALT 636 FOR OP/ED): Performed by: ORTHOPAEDIC SURGERY

## 2025-02-25 PROCEDURE — 97116 GAIT TRAINING THERAPY: CPT | Mod: GP,CQ

## 2025-02-25 PROCEDURE — 80048 BASIC METABOLIC PNL TOTAL CA: CPT | Performed by: ORTHOPAEDIC SURGERY

## 2025-02-25 RX ORDER — ASPIRIN 81 MG/1
81 TABLET ORAL 2 TIMES DAILY
Qty: 60 TABLET | Refills: 0 | Status: SHIPPED | OUTPATIENT
Start: 2025-02-25 | End: 2025-03-27

## 2025-02-25 RX ORDER — ACETAMINOPHEN 325 MG/1
650 TABLET ORAL EVERY 6 HOURS SCHEDULED
Qty: 56 TABLET | Refills: 0 | Status: SHIPPED | OUTPATIENT
Start: 2025-02-25 | End: 2025-03-04

## 2025-02-25 RX ORDER — CYCLOBENZAPRINE HCL 5 MG
5 TABLET ORAL 3 TIMES DAILY PRN
Qty: 21 TABLET | Refills: 0 | Status: SHIPPED | OUTPATIENT
Start: 2025-02-25 | End: 2025-03-04

## 2025-02-25 RX ORDER — OXYCODONE HYDROCHLORIDE 5 MG/1
5 TABLET ORAL EVERY 6 HOURS PRN
Qty: 28 TABLET | Refills: 0 | Status: SHIPPED | OUTPATIENT
Start: 2025-02-25 | End: 2025-03-04

## 2025-02-25 RX ORDER — DOCUSATE SODIUM 100 MG/1
100 CAPSULE, LIQUID FILLED ORAL 2 TIMES DAILY
Qty: 20 CAPSULE | Refills: 0 | Status: SHIPPED | OUTPATIENT
Start: 2025-02-25 | End: 2025-03-07

## 2025-02-25 RX ADMIN — KETOROLAC TROMETHAMINE 15 MG: 30 INJECTION, SOLUTION INTRAMUSCULAR at 00:08

## 2025-02-25 RX ADMIN — ACETAMINOPHEN 650 MG: 325 TABLET ORAL at 00:08

## 2025-02-25 RX ADMIN — ACETAMINOPHEN 650 MG: 325 TABLET ORAL at 06:43

## 2025-02-25 RX ADMIN — CEFAZOLIN SODIUM 2 G: 2 INJECTION, SOLUTION INTRAVENOUS at 00:09

## 2025-02-25 RX ADMIN — TRANEXAMIC ACID 1950 MG: 650 TABLET ORAL at 06:43

## 2025-02-25 RX ADMIN — ASPIRIN 81 MG: 81 TABLET, COATED ORAL at 09:00

## 2025-02-25 RX ADMIN — DOCUSATE SODIUM 100 MG: 100 CAPSULE, LIQUID FILLED ORAL at 09:00

## 2025-02-25 RX ADMIN — OXYCODONE 5 MG: 5 TABLET ORAL at 09:02

## 2025-02-25 RX ADMIN — ACETAMINOPHEN 650 MG: 325 TABLET ORAL at 11:53

## 2025-02-25 RX ADMIN — KETOROLAC TROMETHAMINE 15 MG: 30 INJECTION, SOLUTION INTRAMUSCULAR at 06:43

## 2025-02-25 ASSESSMENT — COGNITIVE AND FUNCTIONAL STATUS - GENERAL
MOVING FROM LYING ON BACK TO SITTING ON SIDE OF FLAT BED WITH BEDRAILS: A LITTLE
TURNING FROM BACK TO SIDE WHILE IN FLAT BAD: A LITTLE
STANDING UP FROM CHAIR USING ARMS: A LITTLE
HELP NEEDED FOR BATHING: A LITTLE
TURNING FROM BACK TO SIDE WHILE IN FLAT BAD: A LITTLE
WALKING IN HOSPITAL ROOM: A LITTLE
HELP NEEDED FOR BATHING: A LITTLE
MOVING FROM LYING ON BACK TO SITTING ON SIDE OF FLAT BED WITH BEDRAILS: A LITTLE
MOVING TO AND FROM BED TO CHAIR: A LITTLE
DRESSING REGULAR UPPER BODY CLOTHING: A LITTLE
DAILY ACTIVITIY SCORE: 22
DRESSING REGULAR LOWER BODY CLOTHING: A LITTLE
WALKING IN HOSPITAL ROOM: A LITTLE
TOILETING: A LITTLE
CLIMB 3 TO 5 STEPS WITH RAILING: A LITTLE
DAILY ACTIVITIY SCORE: 20
STANDING UP FROM CHAIR USING ARMS: A LITTLE
CLIMB 3 TO 5 STEPS WITH RAILING: A LITTLE
MOBILITY SCORE: 18
DRESSING REGULAR LOWER BODY CLOTHING: A LITTLE
MOBILITY SCORE: 18
MOVING TO AND FROM BED TO CHAIR: A LITTLE

## 2025-02-25 ASSESSMENT — ACTIVITIES OF DAILY LIVING (ADL)
HOME_MANAGEMENT_TIME_ENTRY: 11
ADL_ASSISTANCE: INDEPENDENT
BATHING_ASSISTANCE: MINIMAL

## 2025-02-25 ASSESSMENT — PAIN - FUNCTIONAL ASSESSMENT
PAIN_FUNCTIONAL_ASSESSMENT: 0-10

## 2025-02-25 ASSESSMENT — PAIN SCALES - PAIN ASSESSMENT IN ADVANCED DEMENTIA (PAINAD): TOTALSCORE: MEDICATION (SEE MAR)

## 2025-02-25 ASSESSMENT — PAIN SCALES - GENERAL
PAINLEVEL_OUTOF10: 2
PAINLEVEL_OUTOF10: 3
PAINLEVEL_OUTOF10: 2
PAINLEVEL_OUTOF10: 0 - NO PAIN

## 2025-02-25 ASSESSMENT — PAIN DESCRIPTION - ORIENTATION
ORIENTATION: RIGHT
ORIENTATION: RIGHT

## 2025-02-25 ASSESSMENT — PAIN DESCRIPTION - LOCATION
LOCATION: KNEE
LOCATION: KNEE

## 2025-02-25 NOTE — PROGRESS NOTES
Orthopedic Surgery Progress Note   TKA    Subjective:     Post-Operative Day # 1   Status Post right Total Knee Arthroplasty    Systemic or Specific Complaints: No Complaints    Objective:     Visit Vitals  /61   Pulse 53   Temp 36.1 °C (97 °F)   Resp 18       General: alert and oriented, in no acute distress, appears stated age, and cooperative   Wound: no erythema, no edema, no drainage, and dressing remains clean, dry, and intact   Motion: Painful range of Motion   DVT Exam: No evidence of DVT seen on physical exam.  Negative Zena's sign.  No significant calf/ankle edema.       right knee swollen but thigh soft to palpation. Strong equal dorsi/plantar flexion bilaterally. Good distal pulses bilaterally.      Data Review  Recent Results (from the past 24 hours)   CBC    Collection Time: 02/25/25  6:40 AM   Result Value Ref Range    WBC 12.4 (H) 4.4 - 11.3 x10*3/uL    nRBC 0.0 0.0 - 0.0 /100 WBCs    RBC 3.75 (L) 4.00 - 5.20 x10*6/uL    Hemoglobin 11.6 (L) 12.0 - 16.0 g/dL    Hematocrit 32.2 (L) 36.0 - 46.0 %    MCV 86 80 - 100 fL    MCH 30.9 26.0 - 34.0 pg    MCHC 36.0 32.0 - 36.0 g/dL    RDW 12.4 11.5 - 14.5 %    Platelets 175 150 - 450 x10*3/uL   Basic metabolic panel    Collection Time: 02/25/25  6:40 AM   Result Value Ref Range    Glucose 133 (H) 74 - 99 mg/dL    Sodium 135 (L) 136 - 145 mmol/L    Potassium 4.2 3.5 - 5.3 mmol/L    Chloride 104 98 - 107 mmol/L    Bicarbonate 24 21 - 32 mmol/L    Anion Gap 11 10 - 20 mmol/L    Urea Nitrogen 22 6 - 23 mg/dL    Creatinine 0.87 0.50 - 1.05 mg/dL    eGFR 69 >60 mL/min/1.73m*2    Calcium 8.7 8.6 - 10.3 mg/dL         Assessment:     Status Post right Total Knee Arthroplasty. Doing well postoperatively. No acute events overnight.     Acute postoperative pain: Reports mild to moderate pain to operative extremity. Exacerbated by movement, relieved by rest ice and pain medication.  Continue current pain management.     Acute postoperative blood loss anemia:  Secondary to expected surgical blood loss. Hemoglobin this A.M. 11.6.  Patient asymptomatic, and remains hemodynamically stable with no signs of active bleeding.     Leukocytosis: WBC count this morning 12.4.  No acute signs of infection.  Patient afebrile, remains hemodynamically stable denies any cough, abdominal pain, or urinary symptoms. Transient elevation in white blood count is most likely secondary to stress and inflammatory process from surgery.    Plan:      1.  Discharge today, Return to Clinic: in 2 weeks    2.  Continue Deep venous thrombosis prophylaxis: Aspirin 81 mg BID for 30 days  3.  Continue physical therapy: Weight-bearing as tolerated with use of walker for assistance with ambulation   4.  Continue Pain Control: scripts sent  5.  Discharge planning: patient plans to return to home with  home care at discharge, orders placed. [facility] and [facility] following for discharge planning.       MALIK Wilkins   2/25/2025 9:17 AM

## 2025-02-25 NOTE — PROGRESS NOTES
02/25/25 1006   Discharge Planning   Home or Post Acute Services In home services   Type of Home Care Services Home OT;Home PT   Expected Discharge Disposition Home H  ([facility])     Pt is s/p POD #1 Elective Right total knee arthoplasty 2/24/25 with Dr. Miles Mon. Pt is weight bearing as tolerated to operative extremity with use of walker for assistance with ambulation. Pt will discharge on Aspirin 81 mg PO BID x 30 days for DVT prophylaxis and then can resume daily Aspirin after 30 days. [facility] scores are PT (16) OT (pending) recommending continued therapy at low level/intensity + new FWW as pts was her late husbands and is 15 + years old. Pt has decided that discharge preference is home with [facility] and agency of choice is [facility]. Demographics verified. [facility]  notified of [facility] referral/HCO in Epic, confirms received and processed for next day SOC 2/26/25.

## 2025-02-25 NOTE — NURSING NOTE
This nurse introduced self and role to patient and family members, prepared and provided AVS, sat with all, started and completed discharge education, pt verbalized understanding, without further questions or concerns, agreeable and comfortable with discharge at this time, piv removed with catheter intact, no tele, pt states she has all of her belongings, transport requested, discharge complete.

## 2025-02-25 NOTE — PROGRESS NOTES
"Occupational Therapy    Evaluation/Treatment    Patient Name: Karly Milton \"DAYANNA\"  MRN: [id number]  Department: [facility]  Room: [address] [place] [address]  Today's Date: 2/25/2025  Time Calculation  Start Time: 0838  Stop Time: 0859  Time Calculation (min): 21 min      Assessment:  Pt is s/p TKR with increased pain, decreased strength, endurance, and safety with functional transfers and ADL tasks. Patient requires additional training regarding proper techniques to safely complete ADLs. Additional training and education completed this date.   Prognosis: Good  Evaluation/Treatment Tolerance: Patient tolerated treatment well  End of Session Communication: Bedside nurse  End of Session Patient Position: Up in chair, Alarm on  OT Assessment Results: Decreased ADL status, Decreased IADLs, Decreased endurance, Decreased functional mobility    Plan:  OT eval and treatment / education completed. No further skilled OT indicated at this time. Pt verbalizes confidence returning home and resuming self care tasks.   OT Discharge Recommendations: No further acute OT  OT - OK to Discharge: Yes       Subjective   Current Problem:  1. Unilateral primary osteoarthritis, right knee        2. S/P total knee replacement, right  aspirin 81 mg EC tablet    acetaminophen (Tylenol) 325 mg tablet    docusate sodium (Colace) 100 mg capsule    oxyCODONE (Roxicodone) 5 mg immediate release tablet    cyclobenzaprine (Flexeril) 5 mg tablet    Referral to Home Health        General:  General  Reason for Referral: Decline in self care performance; pt admit for elective surgery; s/p R TKA 2/24/25.  Referred By: OT/PT Dr Alex Mon 2/24  Past Medical History Relevant to Rehab: HLD,HTN,Neuropathy,OA,GERD,ASHD  Family/Caregiver Present: No  Patient Position Received: Alarm on (sitting EOB)  General Comment: Pt pleasant and cooperative; agreeable to OT.  Precautions:  LE Weight Bearing Status: Weight Bearing as Tolerated  Medical Precautions: Fall precautions  Post-Surgical " Precautions: Right total knee precautions  Precautions Comment: (+) SLR, KI removed    Pain:  Pain Assessment  Pain Assessment: 0-10  0-10 (Numeric) Pain Score: 2  Pain Type: Acute pain, Surgical pain  Pain Location: Knee  Pain Orientation: Right  Pain Interventions: Cold applied, Elevated    Pain Limitation  If pain is limiting:  Educated patient on positioning to reduce pain  Educated patient on rest/activity to address increase in pain  Adjusted / adapted ADLs/IADLs to reduce pain with these activities  Patient trained for proper mobility/transfer techniques to decrease pain    Objective   Cognition:  Overall Cognitive Status: Within Functional Limits  Processing Speed: Within funtional limits      Home Living:  Home Living Comments: pt lives at home alone in a 2 level home with 0 steps to enter pt bed and bath on 2nd floor flight of steps with rail to reach . has a walk in shower with grab bars; elevated commode . pt has half/ bath on 1st floor  Prior Function:  Level of [place]: Independent with ADLs and functional transfers, Independent with homemaking with ambulation  ADL Assistance: Independent  Homemaking Assistance: Independent  Ambulatory Assistance: Independent  Prior Function Comments: per pt IND with all mobility,adls and iadls.  has a FWW,. no falls, still drives. retired     ADL:  Eating Assistance: Independent  Grooming Assistance: Stand by  Grooming Deficit: Standing with assistive device  Bathing Assistance: Minimal  UE Dressing Deficit: Setup  LE Dressing Assistance: Minimal  LE Dressing Deficit: Requires assistive device for steadying, Increased time to complete, Supervision/safety, Verbal cueing, Setup, Use of adaptive equipment (has long handle shoe horn at home; fair reach to LEs however [place]; educated on benefits of AE for ease and energy conservation; receptive to edu provided.)  Toileting Assistance with Device: Stand by  Functional Assistance: Minimal  Functional Deficit: Increased  time to complete, Supervision/safety, Verbal cueing, Steadying, Setup  ADL Comments: ADLs assessed at FWW level.    Activity Tolerance:  Activity Tolerance Comments: Decreased activity tolerance tolerance with increased time for BADL task completion    Skilled BADL Treatment / Intervention  Adaptive equipment training  Safety deficit modifications  Compensatory training  Energy conservation    Progress in BADL Status  Improvement noted  Modified independence level  Cueing required  Use of compensatory strategies    Bed Mobility/Transfers: Transfers  Transfer: Yes  Transfer 1  Transfer From 1: Sit to, Stand to, Toilet to, Chair with arms to  Transfer Device 1: Walker  Transfer Level of Assistance 1: Close supervision  Trials/Comments 1: Pt educated in safe ADL transfer techniques including shower transfer with DME.    Functional Mobility:    SBA at FWW level; initial cues for safety and good application thereafter.    Sensation:  Sensation Comment: BUE sensation and proprioception WFL.  Strength:  Strength Comments: BUE AROM and strength WFL for ADL participation.    Coordination:  Movements are Fluid and Coordinated: Yes   Hand Function:  Gross Grasp: Functional  Coordination: Functional    Outcome Measures:[facility] Daily Activity  Putting on and taking off regular lower body clothing: A little  Bathing (including washing, rinsing, drying): A little  Putting on and taking off regular upper body clothing: None  Toileting, which includes using toilet, bedpan or urinal: None  Taking care of personal grooming such as brushing teeth: None  Eating Meals: None  Daily Activity - Total Score: 22    Education Documentation  Precautions, taught by Kelly Stallings OT at 2/25/2025 10:16 AM.  Learner: Patient  Readiness: Acceptance  Method: Explanation, Demonstration  Response: Verbalizes Understanding, Demonstrated Understanding    ADL and Functional Transfer Training, taught by Kelly Stallings OT at 2/25/2025 10:16  AMBER.  Learner: Patient  Readiness: Acceptance  Method: Explanation, Demonstration  Response: Verbalizes Understanding, Demonstrated Understanding    Goals:  Pt demonstrate improved ADL task performance and safe functional transfers for safe discharge to least restrictive environment.  Pt goal: To go home.

## 2025-02-25 NOTE — HH CARE COORDINATION
Home Care received a Referral for Physical Therapy and Occupational Therapy. We have processed the referral for a Start of Care on 2/26/25.     If you have any questions or concerns, please feel free to contact us at [phone]. Follow the prompts, enter your five digit zip code, and you will be directed to your care team on [facility] 1.

## 2025-02-25 NOTE — PROGRESS NOTES
"Physical Therapy    Physical Therapy Treatment    Patient Name: Karly Milton \"DAYANNA\"  MRN: [id number]  Today's Date: 2/25/2025  Time Calculation  Start Time: 0928  Stop Time: 1025  Time Calculation (min): 57 min     610/610-A    Assessment/Plan   PT Assessment  PT Assessment Results: Decreased strength, Decreased range of motion, Decreased endurance, Impaired balance, Decreased mobility, Decreased coordination, Pain  Rehab Prognosis: Good  Evaluation/Treatment Tolerance: Patient tolerated treatment well  Medical Staff Made Aware: Yes  End of Session Communication: Bedside nurse  Assessment Comment: pt participating well and making progress towards goals  End of Session Patient Position: Up in chair, Alarm on     Treatment/Interventions: Bed mobility, Transfer training, Gait training, Stair training, Balance training, Strengthening, Endurance training, Range of motion, Therapeutic exercise, Therapeutic activity, Home exercise program  PT Plan: Ongoing PT  PT Frequency: BID  PT Discharge Recommendations: Low intensity level of continued care  Equipment Recommended upon Discharge: Wheeled walker (pt to benefit from FWW issues to pt. has  old walker . states its 15 + years old.)   PT Recommended Transfer Status: Assist x1, Assistive device    General Visit Information:   PT  Visit  PT Received On: 02/25/25  General  Reason for Referral: R TKR  Family/Caregiver Present: No  Prior to Session Communication: Bedside nurse (cleared to participate)  Patient Position Received: Up in chair, Alarm on  General Comment: pt agreable to participate,denies any home going concerns, states that she is surprised by how good she feels , states she will have help at home rom family    General Observations:   General Observation: leg rest of  recliner only partially elevated leaving knee in 45 degrees of flexion, discussed importance of no pillows under knee and correct positioning of recliner to maintain proper position    Subjective "     Precautions:  Precautions  LE Weight Bearing Status: Weight Bearing as Tolerated  Medical Precautions: Fall precautions  Precautions Comment: no knee immobilizer secondary togood quad control    Vital Signs:     Objective     Pain:  Pain Assessment  Pain Assessment: 0-10  0-10 (Numeric) Pain Score: 2 (at rest ,  increased to 4/10 with ther ex and gait, 2/10 at end of session with CP in place)  Pain Type: Surgical pain, Acute pain  Pain Location: Knee  Pain Orientation: Right  Pain Interventions: Cold applied    Cognition:  Cognition  Overall Cognitive Status: Within Functional Limits        Extremity/Trunk Assessments:        RLE   RLE : Exceptions to WFL  AROM RLE (degrees)  R Knee Flexion 0-130: 92 (measured seated)  R Knee Extension 0-130: 6 (measured in longsit)       Activity Tolerance:  Activity Tolerance  Endurance: Endurance does not limit participation in activity    Treatments:  Therapeutic Exercise  Therapeutic Exercise Performed: Yes  Therapeutic Exercise Activity 1: Pt performed supine ther ex including ankle pumps, quad sets, glut sets B/L LEs and heel slides and SAQ B  LE 15 to 20 reps x1, SLR 10 reps x1. Cues for technique and breathing.  Therapeutic Exercise Activity 2: Pt performed seated ther ex including heel/toe raises and hip ABD, LAQ, heel slides, and marches 20 reps x1. Reviewed home exercise program. Educated on goal for 2-3 sets of 20 reps to tolerance. Pt with good understanding.     Ambulation/Gait Training  Ambulation/Gait Training Performed: Yes  Ambulation/Gait Training 1  Surface 1: Level tile  Device 1: Rolling walker  Comments/Distance (ft) 1: ambulating 75 feet and 100 ft with WW and SBA using slow steady step through gait after first few steps.  Transfers  Transfer: Yes  Transfer 1  Technique 1: Sit to stand, Stand to sit  Trials/Comments 1: transfers sit<--> stand with WW and CGA using good hand placement and safety awareness  Stairs  Stairs: Yes (up and down 5 stairs 2 times  with use of SPC and single railing using step to gait pattern and CGA, also performed with single railing and hand held assist with CGA. Pt plans on purchasing SPC ,  states she has a souveneir cane she  can use if needed)  Stairs.  pt  using step to gait pattern with infrequent vc for sequencing          Outcome Measures:     [facility] Basic Mobility  Turning from your back to your side while in a flat bed without using bedrails: A little  Moving from lying on your back to sitting on the side of a flat bed without using bedrails: A little  Moving to and from bed to chair (including a wheelchair): A little  Standing up from a chair using your arms (e.g. wheelchair or bedside chair): A little  To walk in hospital room: A little  Climbing 3-5 steps with railing: A little  Basic Mobility - Total Score: 18          EDUCATION:    Individual(s) Educated: Patient  Education Provided: Anatomy, Fall Risk, Home Exercise Program, Home Safety  Patient Response to Education: Patient/Caregiver Verbalized Understanding of Information, Patient/Caregiver Performed Return Demonstration of Exercises/Activities, Patient/Caregiver Asked Appropriate Questions  Education Comment: instructed in use of and issued WW for home    Encounter Problems       Encounter Problems (Active)       PT Problem       Pt will demonstrate mod I  with bed mobility to edge of bed.   (Progressing)       Start:  02/24/25    Expected End:  03/03/25            Pt will demonstrate mod I  with sit to stand/chair transfers with FWW.   (Progressing)       Start:  02/24/25    Expected End:  03/03/25            Pt will ambulate 150 feet with FWW MOD I .   (Progressing)       Start:  02/24/25    Expected End:  03/03/25            Pt to demo flight of steps with  rails with SUP   (Progressing)       Start:  02/24/25    Expected End:  03/03/25            Pt to demo 0-100 ROM of R  Knee   (Progressing)       Start:  02/24/25    Expected End:  03/03/25            Patient will  demonstrate independence in home program for support of progression (Progressing)       Start:  02/24/25    Expected End:  03/03/25         Goal Note       Patient will demonstrate independence in home program for support of progression                 Pain - Adult

## 2025-02-25 NOTE — DISCHARGE SUMMARY
Discharge summary  This patient Karly Milton was admitted to the hospital on 2/24/2025  after undergoing Procedure(s) (LRB):  RESURFACING ARTHROPLASTY, KNEE, TOTAL (Right) without complications that morning.    During the postoperative period,while in hospital, patient was medically managed by the hospitalist. Please see medial notes and H&P for patients additional diagnoses.  Ortho agrees with all medical diagnoses and treatments while patient in hospital.  No significant or unexpected findings or abnormal ortho imaging were noted during the hospital stay    Hospital course      Patient tolerated surgical procedure well and there was no complications. Patient progressed adequately through their recovery during hospital stay including PT and rehabilitation.    Patient was then D/C on  to home  in stable condition.  Patient was instructed on the use of pain medications, the signs and symptoms of infection, and was given our number to call should they have any questions or concerns following discharge.    Based on my clinical judgment, the patient was provided with a 7-day prescription for opioid medication at 30 MED, indicated for treatment of acute pain in the setting of recent surgery. OARRS report was run and has demonstrated an appropriate time course.  The patient has been provided with counseling pertaining to safe use of opioid medication.    Patient may WBAT to operative extremity with use of walker for assistance with ambulation   Mepilex dressing to be removed POD#7 and incision left open to air  Aspirin 81 mg BID for DVT prophylaxis started on 2/24/25 and to be taken for 30 days  Follow up with surgeon in 2 weeks

## 2025-02-25 NOTE — CARE PLAN
The patient's goals for the shift include      The clinical goals for the shift include Patient will report pain level of 3/10 or less with use of scheduled and PRN pain medications    Over the shift, the patient did not make progress toward the following goals. Barriers to progression include none. Recommendations to address these barriers include none.

## 2025-02-25 NOTE — CARE PLAN
The clinical goals for the shift include Patient will report pain level of 3/10 or less with use of scheduled and PRN pain medications    Problem: Pain - Adult  Goal: Verbalizes/displays adequate comfort level or baseline comfort level  Outcome: Adequate for Discharge     Problem: Safety - Adult  Goal: Free from fall injury  Outcome: Adequate for Discharge     Problem: Discharge Planning  Goal: Discharge to home or other facility with appropriate resources  Outcome: Adequate for Discharge     Problem: Chronic Conditions and Co-morbidities  Goal: Patient's chronic conditions and co-morbidity symptoms are monitored and maintained or improved  Outcome: Adequate for Discharge     Problem: Nutrition  Goal: Nutrient intake appropriate for maintaining nutritional needs  Outcome: Adequate for Discharge     Problem: Skin  Goal: Decreased wound size/increased tissue granulation at next dressing change  Outcome: Adequate for Discharge  Goal: Participates in plan/prevention/treatment measures  Outcome: Adequate for Discharge  Goal: Prevent/manage excess moisture  Outcome: Adequate for Discharge  Goal: Prevent/minimize sheer/friction injuries  Outcome: Adequate for Discharge  Goal: Promote/optimize nutrition  Outcome: Adequate for Discharge  Goal: Promote skin healing  Outcome: Adequate for Discharge     Problem: Pain  Goal: Takes deep breaths with improved pain control throughout the shift  Outcome: Adequate for Discharge  Goal: Turns in bed with improved pain control throughout the shift  Outcome: Adequate for Discharge  Goal: Walks with improved pain control throughout the shift  Outcome: Adequate for Discharge  Goal: Performs ADL's with improved pain control throughout shift  Outcome: Adequate for Discharge  Goal: Participates in PT with improved pain control throughout the shift  Outcome: Adequate for Discharge  Goal: Free from opioid side effects throughout the shift  Outcome: Adequate for Discharge  Goal: Free from acute  confusion related to pain meds throughout the shift  Outcome: Adequate for Discharge     Problem: Fall/Injury  Goal: Not fall by end of shift  Outcome: Adequate for Discharge  Goal: Be free from injury by end of the shift  Outcome: Adequate for Discharge  Goal: Verbalize understanding of personal risk factors for fall in the hospital  Outcome: Adequate for Discharge  Goal: Verbalize understanding of risk factor reduction measures to prevent injury from fall in the home  Outcome: Adequate for Discharge  Goal: Use assistive devices by end of the shift  Outcome: Adequate for Discharge  Goal: Pace activities to prevent fatigue by end of the shift  Outcome: Adequate for Discharge

## 2025-02-26 ENCOUNTER — HOME CARE VISIT (OUTPATIENT)
Dept: HOME HEALTH SERVICES | Facility: HOME HEALTH | Age: 75
End: 2025-02-26
Payer: MEDICARE

## 2025-02-26 PROCEDURE — 169592 NO-PAY CLAIM PROCEDURE

## 2025-02-26 PROCEDURE — G0151 HHCP-SERV OF PT,EA 15 MIN: HCPCS | Mod: HHH

## 2025-02-26 ASSESSMENT — ACTIVITIES OF DAILY LIVING (ADL)
AMBULATION_DISTANCE/DURATION_TOLERATED: 15'
ENTERING_EXITING_HOME: CONTACT GUARD ASSIST
AMBULATION ASSISTANCE ON FLAT SURFACES: 1
OASIS_M1830: 05

## 2025-02-26 ASSESSMENT — ENCOUNTER SYMPTOMS
HIGHEST PAIN SEVERITY IN PAST 24 HOURS: 8/10
PAIN LOCATION: RIGHT KNEE
LOWEST PAIN SEVERITY IN PAST 24 HOURS: 3/10
PERSON REPORTING PAIN: PATIENT
PAIN LOCATION - PAIN SEVERITY: 4/10
SUBJECTIVE PAIN PROGRESSION: GRADUALLY IMPROVING
PAIN: 1

## 2025-02-27 ENCOUNTER — HOME CARE VISIT (OUTPATIENT)
Dept: HOME HEALTH SERVICES | Facility: HOME HEALTH | Age: 75
End: 2025-02-27
Payer: MEDICARE

## 2025-02-27 PROCEDURE — G0152 HHCP-SERV OF OT,EA 15 MIN: HCPCS | Mod: HHH

## 2025-02-28 SDOH — ECONOMIC STABILITY: HOUSING INSECURITY
HOME SAFETY: LIVES ALONE IN 2 STORY HOME WITH 2ND FLOOR BEDROOM AND 1ST FLOOR 1/2 BATHROOM. DAUGHTER STAYING WITH HER AS LONG AS NEEDED.

## 2025-02-28 ASSESSMENT — ACTIVITIES OF DAILY LIVING (ADL)
TOILETING: 1
GROOMING ASSESSED: 1
BATHING ASSESSED: 1
DRESSING_UB_CURRENT_FUNCTION: SUPERVISION
LAUNDRY ASSESSED: 1
BATHING EQUIPMENT USED: TUB SHOWER
LAUNDRY: DEPENDENT
AMBULATION ASSISTANCE: SUPERVISION
TOILETING: SUPERVISION
AMBULATION ASSISTANCE: 1
GROOMING_CURRENT_FUNCTION: SUPERVISION
LIGHT HOUSEKEEPING: DEPENDENT
DRESSING_LB_CURRENT_FUNCTION: SUPERVISION
HOUSEKEEPING ASSESSED: 1
GROOMING EQUIPMENT USED: SETUP
BATHING_CURRENT_FUNCTION: SUPERVISION
PREPARING MEALS: DEPENDENT

## 2025-02-28 ASSESSMENT — ENCOUNTER SYMPTOMS
PERSON REPORTING PAIN: PATIENT
PAIN LOCATION - PAIN FREQUENCY: CONSTANT
PAIN: 1
PAIN SEVERITY GOAL: 0/10
PAIN LOCATION - PAIN SEVERITY: 2/10
PAIN LOCATION: RIGHT KNEE
LOWEST PAIN SEVERITY IN PAST 24 HOURS: 2/10
SUBJECTIVE PAIN PROGRESSION: GRADUALLY IMPROVING
PAIN LOCATION - EXACERBATING FACTORS: TKR SX
PAIN LOCATION - PAIN DURATION: SINCE SX
HIGHEST PAIN SEVERITY IN PAST 24 HOURS: 6/10
PAIN LOCATION - PAIN QUALITY: ACHES

## 2025-03-03 ENCOUNTER — HOME CARE VISIT (OUTPATIENT)
Dept: HOME HEALTH SERVICES | Facility: HOME HEALTH | Age: 75
End: 2025-03-03
Payer: MEDICARE

## 2025-03-03 PROCEDURE — G0151 HHCP-SERV OF PT,EA 15 MIN: HCPCS | Mod: HHH

## 2025-03-04 ASSESSMENT — ENCOUNTER SYMPTOMS
PERSON REPORTING PAIN: PATIENT
HIGHEST PAIN SEVERITY IN PAST 24 HOURS: 7/10
LOWEST PAIN SEVERITY IN PAST 24 HOURS: 2/10
SUBJECTIVE PAIN PROGRESSION: GRADUALLY IMPROVING
PAIN LOCATION: RIGHT KNEE
PAIN LOCATION - PAIN SEVERITY: 4/10
PAIN: 1

## 2025-03-06 ENCOUNTER — HOME CARE VISIT (OUTPATIENT)
Dept: HOME HEALTH SERVICES | Facility: HOME HEALTH | Age: 75
End: 2025-03-06
Payer: MEDICARE

## 2025-03-06 PROCEDURE — G0151 HHCP-SERV OF PT,EA 15 MIN: HCPCS | Mod: HHH

## 2025-03-06 ASSESSMENT — ENCOUNTER SYMPTOMS
DENIES PAIN: 1
PERSON REPORTING PAIN: PATIENT

## 2025-03-09 ENCOUNTER — HOME CARE VISIT (OUTPATIENT)
Dept: HOME HEALTH SERVICES | Facility: HOME HEALTH | Age: 75
End: 2025-03-09
Payer: MEDICARE

## 2025-03-09 PROCEDURE — G0151 HHCP-SERV OF PT,EA 15 MIN: HCPCS | Mod: HHH

## 2025-03-09 ASSESSMENT — ACTIVITIES OF DAILY LIVING (ADL)
OASIS_M1830: 00
HOME_HEALTH_OASIS: 00

## 2025-03-09 ASSESSMENT — ENCOUNTER SYMPTOMS
PAIN LOCATION: RIGHT KNEE
PAIN: 1
LOWEST PAIN SEVERITY IN PAST 24 HOURS: 0/10
SUBJECTIVE PAIN PROGRESSION: GRADUALLY IMPROVING
HIGHEST PAIN SEVERITY IN PAST 24 HOURS: 5/10
PAIN LOCATION - PAIN SEVERITY: 1/10
PERSON REPORTING PAIN: PATIENT

## 2025-03-10 DIAGNOSIS — Z96.651 S/P TOTAL KNEE REPLACEMENT, RIGHT: ICD-10-CM

## 2025-03-10 RX ORDER — OXYCODONE HYDROCHLORIDE 5 MG/1
5 TABLET ORAL EVERY 6 HOURS PRN
Qty: 28 TABLET | Refills: 0 | Status: SHIPPED | OUTPATIENT
Start: 2025-03-10 | End: 2025-03-17

## 2025-03-11 ENCOUNTER — EVALUATION (OUTPATIENT)
Dept: PHYSICAL THERAPY | Facility: CLINIC | Age: 75
End: 2025-03-11
Payer: MEDICARE

## 2025-03-11 ENCOUNTER — OFFICE VISIT (OUTPATIENT)
Dept: ORTHOPEDIC SURGERY | Facility: CLINIC | Age: 75
End: 2025-03-11
Payer: MEDICARE

## 2025-03-11 ENCOUNTER — HOSPITAL ENCOUNTER (OUTPATIENT)
Dept: RADIOLOGY | Facility: CLINIC | Age: 75
Discharge: HOME | End: 2025-03-11
Payer: MEDICARE

## 2025-03-11 DIAGNOSIS — Z96.651 AFTERCARE FOLLOWING RIGHT KNEE JOINT REPLACEMENT SURGERY: Primary | ICD-10-CM

## 2025-03-11 DIAGNOSIS — G89.29 CHRONIC PAIN OF RIGHT KNEE: Primary | ICD-10-CM

## 2025-03-11 DIAGNOSIS — M25.561 RIGHT KNEE PAIN, UNSPECIFIED CHRONICITY: ICD-10-CM

## 2025-03-11 DIAGNOSIS — Z47.1 AFTERCARE FOLLOWING RIGHT KNEE JOINT REPLACEMENT SURGERY: Primary | ICD-10-CM

## 2025-03-11 DIAGNOSIS — M25.561 CHRONIC PAIN OF RIGHT KNEE: Primary | ICD-10-CM

## 2025-03-11 PROCEDURE — 1123F ACP DISCUSS/DSCN MKR DOCD: CPT | Performed by: ORTHOPAEDIC SURGERY

## 2025-03-11 PROCEDURE — 73562 X-RAY EXAM OF KNEE 3: CPT | Mod: RT

## 2025-03-11 PROCEDURE — 99024 POSTOP FOLLOW-UP VISIT: CPT | Performed by: ORTHOPAEDIC SURGERY

## 2025-03-11 PROCEDURE — 97161 PT EVAL LOW COMPLEX 20 MIN: CPT | Mod: GP

## 2025-03-11 PROCEDURE — 99211 OFF/OP EST MAY X REQ PHY/QHP: CPT | Performed by: ORTHOPAEDIC SURGERY

## 2025-03-11 ASSESSMENT — ENCOUNTER SYMPTOMS
OCCASIONAL FEELINGS OF UNSTEADINESS: 0
DEPRESSION: 0
LOSS OF SENSATION IN FEET: 0

## 2025-03-11 ASSESSMENT — PATIENT HEALTH QUESTIONNAIRE - PHQ9
1. LITTLE INTEREST OR PLEASURE IN DOING THINGS: NOT AT ALL
SUM OF ALL RESPONSES TO PHQ9 QUESTIONS 1 AND 2: 0
2. FEELING DOWN, DEPRESSED OR HOPELESS: NOT AT ALL

## 2025-03-11 NOTE — PROGRESS NOTES
"Patient Name: Karly Milton  MRN: [id number]  Time Calculation  Start Time: 1434  Stop Time: 1456  Time Calculation (min): 22 min  PT Evaluation Time Entry  PT Evaluation (Low) Time Entry: 22                      Current Problem  1. Chronic pain of right knee          Insurance    MEDICARE/ ANTHEM 2410($0 USED) COPAY 0 (MET)  COVERAGE 80/100 OOP 0 ANTHEM TO FOLLOW MEDICARE  NO AUTH REQ     Subjective   General: Pt is a 74 y.o. female presenting to clinic s/p R TKA on 2/24 with Dr. Mon.  Currently c/o a fair amount of pain but that she is \"getting around\". Has had some help from her kids, but feels she is mostly able to handle her Adls/IADLs without much assist. Saw Dr. Mon who gave a good report. She has needed a FWW since surgery but did not use a device to ambulate prior to this. She has been taking her oxycodone as prescribed d/t significant difficulty sleeping. Overall she does not yet feel her pain is well controlled, has been icing PRN. She is not yet driving. 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    Home setup    2 story home , 0STE with 12-15 steps with RHR to master bedroom. No basement. Pt lives alone.        Pt denies s/s DVT    Pertinent medical hx:  atherosclerotic heart disease, neuropathy, hx of cardiac cath      Precautions:     Pain:  Current:  6/10  High: 7-8/10  Low: 0/10    Reviewed medical screening form with pt and medical screening assessed    Imaging:   R knee XR 3/11/25:     \"FINDINGS:  Three views of the knee show status post joint replacement in good  alignment.  There are no signs of fracture or dislocation.  No other  bony abnormalities\"      Objective     Knee Musculoskeletal Exam    Inspection    Inspection additional comments: Wound appears to be healing well, no obvious s/s of infection. Wound edges are approximating well, no erythema     Grade I-II edema grossly about the incision and distally to the supramalleolar " area.       There is noted numbness to light touch at the lateral aspect of the R knee, approximately 2-4 inches above and below the joint line    Range of Motion    Right      Right knee active extension: -8.      Right knee passive extension: -7.      Right knee active flexion: 105.      Right knee passive flexion: 112.    Left      Left knee active extension: -4.      Active flexion: 130    Strength    Right      Extension: 4-/5. Extension is affected by pain.       Flexion: 4-/5. Flexion is affected by pain.     Left      Extension: 5/5.       Flexion: 5/5.        Hip Musculoskeletal Exam    Strength    Right      Flexion: 4+/5.       Internal rotation: 3/5. Internal rotation is affected by pain.       External rotation: 4-/5. External rotation is affected by pain.       Abduction: 4-/5. Abduction is affected by pain.     Left      Flexion: 4/5.       Internal rotation: 4+/5.       External rotation: 4+/5.       Abduction: 4/5.     Strength additional comments: Abd/add in hooklying       9.71s L SLS     Unable to perform SLS on RLE     Pt able to navigate steps with reciprocal ascent, non-reciprocal ascent and use of BHR.     Pt ambulates with antalgic gait pattern on RLE, use of FWW with heavy reliance on UE support. Decreased step through and decreased gait speed overall.       Outcome Measures:  LEFS:  38     Treatment: See HEP below    EDUCATION/HEP:  Pt educated on POC, post-op expectations and likely treatment course. Encouraged pt to continue with current HEP from HHPT after review, as it is still appropriate given her current status. Pt in agreement and reported understanding       Assessment:     Pt is a 74 y.o. female presenting to clinic s/p R TKA on 2/24 with Dr. Mon . On exam presents with s/s consistent with the subacute phase of healing following this procedure. Currently demo's the following primary deficits:  decreased R knee strength and ROM, decreased R hip strength, decreased sensation  of the R knee, gait deficit, tenderness to palpation, and edema.. These deficits have lead to functional impairments with walking, prolonged standing, stair navigation, sleeping, home/yard care,  self care, driving, and participation in leisure activities. Recommend skilled PT to address the aforementioned deficits and allow pt to restore PLOF and maximize functional capacity. Anticipate good prognosis given current progress, positive attitude towards therapy, and support system.        Clinical Presentation: Stable     Level of Complexity: Low     Goals:      Pt to be IND with HEP  2. Pt R knee flexion AROM to 120 degrees for improved ability to perform functional transfers   3. Pt knee extension AROM to 0 degrees for improved ability to ambulate without gait abnormality   4. Pt R knee MMT to 5/5 to demonstrate improved ability to perform heavier home/yard care tasks, and improved tolerance to stairs/squatting  5. Pt to self report LEFS score of greater than or equal to 47 to demonstrate statistically significant improvement in function.   6. Pt to be able to navigate stairs with reciprocal pattern and HR as needed for improved ability to traverse home and community settings  7. Pt to be able to ambulate community level distances over various terrains and degrees of surface compliance LASHON with LRAD in order to demonstrate restoration of PLOF    Plan  2x/week for 4 weeks     ROM progression, PRE's as tolerated, manual therapy for reduction of swelling and facilitation of motion  Planned interventions include: aquatic therapy, biofeedback, cryotherapy, dry needling, edema control, education/instruction, electrical stimulation, gait training, home program, hot pack, kinesiotaping, manual therapy, neuromuscular re-education, self care/home management, therapeutic activities, therapeutic exercises, ultrasound and vasopneumatic device w/ cold.

## 2025-03-12 NOTE — PROGRESS NOTES
History of present illness    74-year-old female follow-up right total knee replacement from February 24 she is doing very well no numbness or tingling no fevers or chills no locking or giving way she is ambulating with a walker for assistive ice she is continue with her physical therapy denies any chest pain or shortness of breath.      Past medical , Surgical, Family and social history reviewed.      Physical exam  General: No acute distress and breathing comfortably.  Patient is pleasant and cooperative with the examination.    Extremity  Incision is well-approximated with outside infection range of motion 10-95 no instability brisk cap refill compartments soft calf is nontender no redness or drainage    Diagnostics      XR knee right 3 views    Result Date: 3/11/2025  Interpreted By:  Jasmeet Mon, STUDY: XR KNEE RIGHT 3 VIEWS; 3/11/2025 1:44 pm   INDICATION: Signs/Symptoms:pain.   ACCESSION NUMBER(S): [id number]   ORDERING CLINICIAN: JASMEET MON   FINDINGS: Three views of the knee show status post joint replacement in good alignment.  There are no signs of fracture or dislocation.  No other bony abnormalities       Signed by: Jasmeet Mon 3/11/2025 1:54 PM Dictation workstation:   [id number]    XR knee right 1-2 views    Result Date: 2/24/2025  Interpreted By:  Fabricio Tolbert, STUDY: XR KNEE RIGHT 1-2 VIEWS;  2/24/2025 10:39 am   INDICATION: Signs/Symptoms:Post op knee.     COMPARISON: Pre-surgical Right knee series, 13 November 2024   ACCESSION NUMBER(S): [id number]   ORDERING CLINICIAN: JASMEET MON   TECHNIQUE: Frontal and cross-table lateral views of the right knee obtained portably in the immediate or near-immediate postoperative setting   FINDINGS: No acute unexpected radiographic findings shortly after right total knee arthroplasty with patellar resurfacing; refer to the operative report       As above     MACRO: None   Signed by: Fabricio Tolbert 2/24/2025 11:01  AM Dictation workstation:   [id number]       Procedure  [ none]    Assessment  Status post total knee replacement right    Treatment plan  1.  Continue with the physical therapy continue to wean from the walker as tolerated follow-up 3 weeks repeat evaluation without x-ray at that time.  2. [   ]  3. [   ]  4.  All of the patient's questions were answered.    Orders Placed This Encounter    XR knee right 3 views       This note was prepared using voice recognition software.  The details of this note are correct and have been reviewed, and corrected to the best of my ability.  Some grammatical areas may persist related to the Dragon software    Miles Mon MD  Senior Attending Physician  [facility]  [facility] [place]    [phone]

## 2025-03-18 ENCOUNTER — APPOINTMENT (OUTPATIENT)
Dept: PHYSICAL THERAPY | Facility: CLINIC | Age: 75
End: 2025-03-18
Payer: MEDICARE

## 2025-03-18 DIAGNOSIS — G89.29 CHRONIC PAIN OF RIGHT KNEE: Primary | ICD-10-CM

## 2025-03-18 DIAGNOSIS — M25.561 CHRONIC PAIN OF RIGHT KNEE: Primary | ICD-10-CM

## 2025-03-20 ENCOUNTER — TREATMENT (OUTPATIENT)
Dept: PHYSICAL THERAPY | Facility: CLINIC | Age: 75
End: 2025-03-20
Payer: MEDICARE

## 2025-03-20 DIAGNOSIS — M25.561 CHRONIC PAIN OF RIGHT KNEE: Primary | ICD-10-CM

## 2025-03-20 DIAGNOSIS — G89.29 CHRONIC PAIN OF RIGHT KNEE: Primary | ICD-10-CM

## 2025-03-20 PROCEDURE — 97110 THERAPEUTIC EXERCISES: CPT | Mod: GP,CQ

## 2025-03-20 ASSESSMENT — PAIN SCALES - GENERAL: PAINLEVEL_OUTOF10: 6

## 2025-03-20 ASSESSMENT — PAIN - FUNCTIONAL ASSESSMENT: PAIN_FUNCTIONAL_ASSESSMENT: 0-10

## 2025-03-20 NOTE — PROGRESS NOTES
"Patient Name: Karly Milton  MRN: [id number]  Date 3/20/2025  Time in 0100  Time out 0140  Treatment Time 40 min  Therapeutic Exercise 40 min    Current Problem  1. Chronic pain of right knee          Insurance    MEDICARE/ ANTHEM 2410($0 USED) COPAY 0 (MET)  COVERAGE 80/100 OOP 0 ANTHEM TO FOLLOW MEDICARE  NO AUTH REQ     Subjective   Reports she is not feeling well today. Reports she is not able to sleep well      Precautions:     Pain:  6/10 current knee pain    Imaging:   R knee XR 3/11/25:     \"FINDINGS:  Three views of the knee show status post joint replacement in good  alignment.  There are no signs of fracture or dislocation.  No other  bony abnormalities\"      Objective     108 degrees of knee flexion AROM          Treatment:   Mariano 5'  Quad sets 5' x 15  Heel slides 5' x 10  SLR 2 x 10  Bridges 2 x10  LAQ 2 x10  Heel raise x 15    EDUCATION/HEP:         Assessment:     Patient able to tolerate treatment well with minimal increase in soreness. Pain with endrange knee flexion and extension stretching. Significant guarding during knee flexion activities. Will continue to benefit from PT to improve knee strength and ROM.          Plan  Continue with knee strength and ROM to improve functional mobility.  "

## 2025-03-22 DIAGNOSIS — I10 PRIMARY HYPERTENSION: ICD-10-CM

## 2025-03-24 ENCOUNTER — APPOINTMENT (OUTPATIENT)
Dept: PHYSICAL THERAPY | Facility: CLINIC | Age: 75
End: 2025-03-24
Payer: MEDICARE

## 2025-03-24 DIAGNOSIS — G89.29 CHRONIC PAIN OF RIGHT KNEE: Primary | ICD-10-CM

## 2025-03-24 DIAGNOSIS — M25.561 CHRONIC PAIN OF RIGHT KNEE: Primary | ICD-10-CM

## 2025-03-24 RX ORDER — LISINOPRIL AND HYDROCHLOROTHIAZIDE 12.5; 2 MG/1; MG/1
TABLET ORAL
Qty: 135 TABLET | Refills: 1 | Status: SHIPPED | OUTPATIENT
Start: 2025-03-24

## 2025-03-26 NOTE — PROGRESS NOTES
"Physical Therapy Treatment    Patient Name: Karly Milton  MRN: [id number]  Today's Date: 3/27/2025  Time Calculation  Start Time: 1220  Stop Time: 1302  Time Calculation (min): 42 min     PT Therapeutic Procedures Time Entry  Manual Therapy Time Entry: 10  Therapeutic Exercise Time Entry: 30                 Current Problem  1. Chronic pain of right knee            Insurance:  MEDICARE/ ANTHEM 2410($0 USED) COPAY 0 (MET)  COVERAGE 80/100 OOP 0 ANTHEM TO FOLLOW MEDICARE  NO AUTH REQ   Visit#3/9  Precautions   none    Subjective   Subjective:   Pt reports that she only has minimal knee pain. Today is the first day she has gone without her cane and she is back to driving. Pt was sick the past week, so hasn't really done her exercises.  Pain   1/10    Objective   103 degrees of knee flexion AAROM  AROM ext -5*     Treatments:  Mariano 5'  Quad sets 5' x 15  Heel slides 15x 5  SAQ 3\"x20  SLR 2 x 10  Bridges 2 x10  LAQ 1# 2 x10  TKE w/ ball against wall 3\"x20  Seated hamstring stretch 30\"x2  Standing knee flexion stretch 10\"x18  Heel raise/TR 20x    Seated PROM R knee, Tib PA mobs, femur post mobs     OP EDUCATION:   Access Code: [email or web address]  URL: [email or web address]  Date: 03/27/2025  Prepared by: Yoana Montalvo    Exercises  - Standing Terminal Knee Extension at Wall with Ball  - 1 x daily - 7 x weekly - 2 sets - 10 reps - 5 hold  - Seated Hamstring Stretch  - 1 x daily - 7 x weekly - 3 sets - 30 hold  - Standing Knee Flexion Stretch on Step  - 1 x daily - 7 x weekly - 10 reps      Assessment:   Pt seems to have lost a little bit of knee ROM since last week, probably due to being sick and not doing ex. Introduced seated hamstring stretch and step flex stretch for more ROM. Also added TKE to promote knee extension. Pt had difficulty maintaining knee in straightened position for very long due to pain. Also had pain when knee was flexed to end range    Plan:   Cont to increase knee ROM and R LE " strength

## 2025-03-27 ENCOUNTER — TREATMENT (OUTPATIENT)
Dept: PHYSICAL THERAPY | Facility: CLINIC | Age: 75
End: 2025-03-27
Payer: MEDICARE

## 2025-03-27 DIAGNOSIS — G89.29 CHRONIC PAIN OF RIGHT KNEE: Primary | ICD-10-CM

## 2025-03-27 DIAGNOSIS — M25.561 CHRONIC PAIN OF RIGHT KNEE: Primary | ICD-10-CM

## 2025-03-27 PROCEDURE — 97110 THERAPEUTIC EXERCISES: CPT | Mod: GP,CQ

## 2025-03-27 PROCEDURE — 97140 MANUAL THERAPY 1/> REGIONS: CPT | Mod: GP,CQ

## 2025-03-27 NOTE — PROGRESS NOTES
"Physical Therapy Treatment    Patient Name: Karly Milton  MRN: [id number]  Today's Date: 3/31/2025  Time Calculation  Start Time: 0106  Stop Time: 0150  Time Calculation (min): 44 min     PT Therapeutic Procedures Time Entry  Manual Therapy Time Entry: 10  Therapeutic Exercise Time Entry: 32                 Current Problem  1. Chronic pain of right knee            Insurance:  MEDICARE/ ANTHEM 2410($0 USED) COPAY 0 (MET)  COVERAGE 80/100 OOP 0 ANTHEM TO FOLLOW MEDICARE  NO AUTH REQ   Visit#4/9  Precautions   none    Subjective   Subjective:   Pt reports that her knee has been hurting quite a bit. She isn't sure if she if over doing it, or not doing enough  Pain   3.5/10    Objective   103 degrees of knee flexion AAROM  AROM ext -4*  Treatments:  Angle 5'  Quad sets 5' x 10  Heel slides 15x 5  Supine knee flexion with foot on PB 10x  Supine hip flexor stretch 30\"x2  SAQ 1# 3\"x20  SLR 2 x 10---  Bridges 2 x10  LAQ 1# 2 x10  TKE w/ GTB 3\"x15  Sit to stands w/o UE  Radu stepping with heel strike 15x  Seated hamstring stretch 30\"x2    Standing knee flexion stretch 10\"x10---  Heel raise/TR 20x---     Seated PROM R knee, Tib PA mobs, femur post mobs 10'     OP EDUCATION:   TKE with TB      Assessment:   Pt only able to rock of ANGLE at first, but then completed full revolutions. During heel slides pt seemed to have more pain in the posterior thigh vs the knee, which was limiting her knee flexion. Pt did well with foot on PB knee bends. Added hip flexor stretch with good tolerance. Introduced radu stepping to promote more heel/toe gait. Cues given to avoid circumducting the hip  Plan:   Cont to increase knee ROM and R LE strength              "

## 2025-03-28 ENCOUNTER — APPOINTMENT (OUTPATIENT)
Dept: CARDIOLOGY | Facility: CLINIC | Age: 75
End: 2025-03-28
Payer: MEDICARE

## 2025-03-31 ENCOUNTER — TREATMENT (OUTPATIENT)
Dept: PHYSICAL THERAPY | Facility: CLINIC | Age: 75
End: 2025-03-31
Payer: MEDICARE

## 2025-03-31 DIAGNOSIS — M25.561 CHRONIC PAIN OF RIGHT KNEE: Primary | ICD-10-CM

## 2025-03-31 DIAGNOSIS — G89.29 CHRONIC PAIN OF RIGHT KNEE: Primary | ICD-10-CM

## 2025-03-31 PROCEDURE — 97110 THERAPEUTIC EXERCISES: CPT | Mod: GP,CQ

## 2025-03-31 PROCEDURE — 97140 MANUAL THERAPY 1/> REGIONS: CPT | Mod: GP,CQ

## 2025-04-02 ENCOUNTER — APPOINTMENT (OUTPATIENT)
Dept: PHYSICAL THERAPY | Facility: CLINIC | Age: 75
End: 2025-04-02
Payer: MEDICARE

## 2025-04-02 ENCOUNTER — APPOINTMENT (OUTPATIENT)
Dept: ORTHOPEDIC SURGERY | Facility: CLINIC | Age: 75
End: 2025-04-02
Payer: MEDICARE

## 2025-04-02 DIAGNOSIS — G89.29 CHRONIC PAIN OF RIGHT KNEE: Primary | ICD-10-CM

## 2025-04-02 DIAGNOSIS — M25.561 CHRONIC PAIN OF RIGHT KNEE: Primary | ICD-10-CM

## 2025-04-02 DIAGNOSIS — Z47.1 AFTERCARE FOLLOWING RIGHT KNEE JOINT REPLACEMENT SURGERY: Primary | ICD-10-CM

## 2025-04-02 DIAGNOSIS — Z96.651 AFTERCARE FOLLOWING RIGHT KNEE JOINT REPLACEMENT SURGERY: Primary | ICD-10-CM

## 2025-04-02 PROCEDURE — 99024 POSTOP FOLLOW-UP VISIT: CPT | Performed by: PHYSICIAN ASSISTANT

## 2025-04-02 PROCEDURE — 1123F ACP DISCUSS/DSCN MKR DOCD: CPT | Performed by: PHYSICIAN ASSISTANT

## 2025-04-02 RX ORDER — TRAMADOL HYDROCHLORIDE 50 MG/1
50 TABLET ORAL EVERY 6 HOURS PRN
Qty: 28 TABLET | Refills: 0 | Status: SHIPPED | OUTPATIENT
Start: 2025-04-02 | End: 2025-04-09

## 2025-04-02 RX ORDER — CYCLOBENZAPRINE HCL 5 MG
5 TABLET ORAL NIGHTLY
Qty: 30 TABLET | Refills: 0 | Status: SHIPPED | OUTPATIENT
Start: 2025-04-02

## 2025-04-04 ENCOUNTER — TREATMENT (OUTPATIENT)
Dept: PHYSICAL THERAPY | Facility: CLINIC | Age: 75
End: 2025-04-04
Payer: MEDICARE

## 2025-04-04 DIAGNOSIS — G89.29 CHRONIC PAIN OF RIGHT KNEE: Primary | ICD-10-CM

## 2025-04-04 DIAGNOSIS — M25.561 CHRONIC PAIN OF RIGHT KNEE: Primary | ICD-10-CM

## 2025-04-04 PROCEDURE — 97110 THERAPEUTIC EXERCISES: CPT | Mod: GP

## 2025-04-04 NOTE — PROGRESS NOTES
Patient Name: Karly Milton  MRN: [id number]  Time Calculation  Start Time: 1000  Stop Time: 1041  Time Calculation (min): 41 min     PT Therapeutic Procedures Time Entry  Therapeutic Exercise Time Entry: 41                     Current Problem  1. Chronic pain of right knee  Follow Up In Physical Therapy          Insurance  MEDICARE/ ANTHEM 2410($0 USED) COPAY 0 (MET)  COVERAGE 80/100 OOP 0 ANTHEM TO FOLLOW MEDICARE  NO AUTH REQ   Visit#5/9  Subjective     General  Pt reports she is doing well overall, but is still having quite a bit of trouble sleeping. States as a side sleeper she will frequently wake up with a throbbing pain in the knee.   Precautions    Pain  2-3/10    Objective       121 degrees R knee flexion ROM       AROM ext -4*      Treatments:    Mariano 5'  Heel slides:  15x3s   Bolster quad sets:  15x3s   Supine HS stretch w/ strap:  6x10s   Seated LAQ:  8# 10x5s   Supine bridge:  2x10 (10# 2nd set)   Staggered STS:  2x10   Freemotion TKE :  2x15 10#   GTB hip abd stepout:  2x10 cosme   LAQ 1# 2 x10    OP EDUCATION/HEP:  Access Code: [email or web address]  URL: [email or web address]  Date: 04/04/2025  Prepared by: Albino Lopez    Exercises  - Side Stepping with Resistance at Ankles and Counter Support  - 1 x daily - 3-4 x weekly - 2 sets - 10-15 reps    Assessment     Improved knee flexion noted with modification to heel slide exercise, pt achieving 120 degrees this date. Anticipate rectus femoris tightness may be contributing to her stiffness currently. Still demo's restriction in to extension, lacking 5 degrees actively. Focused large portion of exercises today on quad strengthening in order to work on this deficit. Pt reported some mild fatigue throughout but overall tolerated session well. Added TB step outs to improve glute med strength within HEP. Recommend continued skilled PT to progress strength and ROM as to facilitate restoration of PLOF.   Plan     Continue per POC. Knee  extension/flexion ROM, more standing exercises

## 2025-04-07 NOTE — PROGRESS NOTES
"Physical Therapy Treatment    Patient Name: Karly Milton  MRN: [id number]  Today's Date: 4/8/2025  Time Calculation  Start Time: 1130  Stop Time: 1213  Time Calculation (min): 43 min     PT Therapeutic Procedures Time Entry  Neuromuscular Re-Education Time Entry: 10  Therapeutic Exercise Time Entry: 30                 Current Problem  1. Chronic pain of right knee  Follow Up In Physical Therapy          Insurance:  MEDICARE/ ANTHEM 2410($0 USED) COPAY 0 (MET)  COVERAGE 80/100 OOP 0 ANTHEM TO FOLLOW MEDICARE  NO AUTH REQ   Visit#6/9  Precautions   none    Subjective   Subjective:   Pt reports that her knee is doing pretty good. She has gone back to sleeping in her recliner, which allows her to get some sleep.   Pain   0/10    Objective   112 degrees R knee flexion ROM    AROM ext -5*  Treatments:  Mariano 5'  Heel slides:  15x3s   Bolster quad sets:  10x3s   Supine hip flexor stretch 30\"x2  Supine HS stretch w/ strap:  6x10s   Seated LAQ:  8# 15x5s   Supine bridge:  2x10 (10# 2nd set)   Staggered STS:  2x10   Freemotion TKE :  2x15 10#   GTB hip abd stepout:  x10 cosme   Step ups Fwd 4 in 2x10  Step ups lat 4 in 2x10    OP EDUCATION:   SLB      Assessment:   Pt fatigued and experienced muscle soreness with MARIANO. Better motion achieved with heel slides. Pt still has difficulty tolerating knee in extended position for any length of time. Good control with LAQs, but unable to achieve full ext. Performed sit to stands without rocking. Introduced step ups for more quad strengthening. Pt had some r knee pain with lateral. Included hip flexor stretch to help decrease. Pt displayed fair SLB as able to do for about 5-10 sec. Good overall completion of ex given.    Plan:   Cont with knee ROM and quad strengthening.               "

## 2025-04-08 ENCOUNTER — TREATMENT (OUTPATIENT)
Dept: PHYSICAL THERAPY | Facility: CLINIC | Age: 75
End: 2025-04-08
Payer: MEDICARE

## 2025-04-08 DIAGNOSIS — G89.29 CHRONIC PAIN OF RIGHT KNEE: Primary | ICD-10-CM

## 2025-04-08 DIAGNOSIS — M25.561 CHRONIC PAIN OF RIGHT KNEE: Primary | ICD-10-CM

## 2025-04-08 PROCEDURE — 97110 THERAPEUTIC EXERCISES: CPT | Mod: GP,CQ

## 2025-04-08 PROCEDURE — 97112 NEUROMUSCULAR REEDUCATION: CPT | Mod: GP,CQ

## 2025-04-08 NOTE — PROGRESS NOTES
"  Subjective    Patient ID: Karly CHANDLER\"    Chief Complaint:   Chief Complaint   Patient presents with    Right Knee - Post-op     TKR 2/24/25       History of present illness    74-year-old female presented clinic today for 6-week postop follow-up visit status post right total knee replacement.  Overall doing well.  Still having some issues with overall tightness and tension.  Continue to work through outpatient physical therapy which seems to be going well.  Ambulating without assistance at this time.  Mild numbness tingling over the anterior lateral aspect of the right knee.  No instability reported.      Past medical , Surgical, Family and social history reviewed.      Physical exam  General: No acute distress and breathing comfortably.  Patient is pleasant and cooperative with the examination.    Extremity  Right knee is neurovascularly intact.  Range of motion 0 to 103 degrees.  Mild edema of the right knee.  No instability.  No signs of any infection.  Incision is well-healed and well-approximated at this time.  Compartments are soft.  No calf swelling or tenderness.  No sign of DVT.    Diagnostics  [ none]  Imaging  No results found.    Cardiology, Vascular, and Other Imaging  No other imaging results found for the past 7 days       Procedure  [ none]    Assessment  Status post right total knee replacement    Treatment plan  1.  Prescription for cyclobenzaprine and tramadol sent to the pharmacy.   2.  Prescription for myofascial massage entered in the chart.  Wound instruction discussed with the patient.  3.  She will follow-up with us in 1 month for reevaluation with new x-rays.  4.  All of the patient's questions were answered.    Orders Placed This Encounter    Referral to Physical Therapy    cyclobenzaprine (Flexeril) 5 mg tablet    traMADol (Ultram) 50 mg tablet       This note was prepared using voice recognition software.  The details of this note are correct and have been reviewed, and corrected to the " best of my ability.  Some grammatical areas may persist related to the Dragon software    Larry Tejeda PA-C, [facility]  [facility]    [phone]

## 2025-04-10 ENCOUNTER — TREATMENT (OUTPATIENT)
Dept: PHYSICAL THERAPY | Facility: CLINIC | Age: 75
End: 2025-04-10
Payer: MEDICARE

## 2025-04-10 DIAGNOSIS — G89.29 CHRONIC PAIN OF RIGHT KNEE: Primary | ICD-10-CM

## 2025-04-10 DIAGNOSIS — M25.561 CHRONIC PAIN OF RIGHT KNEE: Primary | ICD-10-CM

## 2025-04-10 PROCEDURE — 97110 THERAPEUTIC EXERCISES: CPT | Mod: GP

## 2025-04-10 NOTE — PROGRESS NOTES
Patient Name: Karly Milton  MRN: [id number]  Time Calculation  Start Time: 1120  Stop Time: 1158  Time Calculation (min): 38 min     PT Therapeutic Procedures Time Entry  Therapeutic Exercise Time Entry: 38                     Current Problem  1. Chronic pain of right knee            Insurance  MEDICARE/ ANTHEM 2410($0 USED) COPAY 0 (MET)  COVERAGE 80/100 OOP 0 ANTHEM TO FOLLOW MEDICARE  NO AUTH REQ   Visit#7/9    Subjective     General  Pt reported a bit of stiffness after last visit, took some tylenol. Overall feels she is gradually improving. No pain entering today's visit.   Precautions    Pain  0/10    Objective   LEFS:  50    Knee Musculoskeletal Exam    Range of Motion    Right      Right knee active extension: lacking 4 degrees.      Right knee active flexion: 115.    Strength    Right      Extension: 4+/5.       Flexion: 4+/5.     Left      Extension: 5/5.       Flexion: 5/5.     Hip Musculoskeletal Exam    Strength    Right      Flexion: 4+/5.       Internal rotation: 4+/5.       External rotation: 4/5.       Abduction: 4-/5.     Left      Flexion: 4+/5.       Internal rotation: 5/5.       External rotation: 5/5.       Abduction: 4/5.      Amb with ipsilateral trunk lean in RLE stance , no device. Minimal antalgia noted at this time    Stairs - LASHON with x1 HR and reciprocal pattern, no obvious instability or unsteadiness     Treatments:    Goal assessment/re-check:  15'   Heel slide w/ strap:  5x5s   Bolster quad sets:  10x5s   Supine HS stretch w/OP in to knee extension: 5x5s  Seated HS stretch: 5x5s  Power step ups: x10   TB SL hip flexion:  x10 GTB         OP EDUCATION/HEP:  Discussed progress towards goals, continuation of care. Pt in agreement to cont with therapy 1x/week for 4 more visits, focusing on knee extension strength and motion     Access Code: [email or web address]  URL: [email or web address]  Date: 04/10/2025  Prepared by: Albino Lopez    Exercises  - Side Stepping with  Resistance at Ankles and Counter Support  - 1 x daily - 3-4 x weekly - 2 sets - 10-15 reps  - Seated Hamstring Stretch  - 1 x daily - 7 x weekly - 5-10 sets - 5-15 reps  - Supine Hamstring Stretch with Strap  - 2 x daily - 7 x weekly - 1 sets - 5-10 reps - 5-15 hold  - Standing Terminal Knee Extension at Wall with Ball  - 1 x daily - 4-5 x weekly - 1-2 sets - 10 reps - 5-10 hold  - Standing Knee Flexion Stretch on Step  - 1 x daily - 7 x weekly - 1-2 sets - 10-15 reps - 5 hold  - Standing Hip Flexion with Resistance Loop  - 1 x daily - 3-4 x weekly - 2 sets - 10 reps  - Runner's Step Up/Down  - 1 x daily - 3-4 x weekly - 2 sets - 10 reps  - Staggered Sit-to-Stand  - 1 x daily - 2-3 x weekly - 2 sets - 8-10 reps  Assessment   Pt seen for re-check this date. At this time is demonstrating noted improvements in R knee strength and ROM in all planes. Pt endorses significant improvement in function via LEFS outcome measure. Joseph's good ability to navigates stairs and is ambulating without device at this time. Deficits persist with knee extension ROM, as well as general R knee and hip strength. She endorses continued functional restrictions with home/yard care, prolonged walking, squatting, and participation in leisure activities. Anticipate she would still benefit from skilled PT to address strength and ROM deficits in order to maximize LOF and facilitate goal achievement.      Pt to be IND with HEP (MET, still progressive)   2. Pt R knee flexion AROM to 120 degrees for improved ability to perform functional transfers (progressing)   3. Pt knee extension AROM to 0 degrees for improved ability to ambulate without gait abnormality (progressing)   4. Pt R knee MMT to 5/5 to demonstrate improved ability to perform heavier home/yard care tasks, and improved tolerance to stairs/squatting (Progressing)   5. Pt to self report LEFS score of greater than or equal to 47 to demonstrate statistically significant improvement in  function. (MET)   6. Pt to be able to navigate stairs with reciprocal pattern and HR as needed for improved ability to traverse home and community settings (MET)   7. Pt to be able to ambulate community level distances over various terrains and degrees of surface compliance LASHON with LRAD in order to demonstrate restoration of PLOF (MET)     Plan     Continue per POC. 1x/week for 4 more visits. Knee extension ROM, general RLE strengthening to tolerance

## 2025-04-15 ENCOUNTER — TREATMENT (OUTPATIENT)
Dept: PHYSICAL THERAPY | Facility: CLINIC | Age: 75
End: 2025-04-15
Payer: MEDICARE

## 2025-04-15 DIAGNOSIS — M25.561 CHRONIC PAIN OF RIGHT KNEE: Primary | ICD-10-CM

## 2025-04-15 DIAGNOSIS — G89.29 CHRONIC PAIN OF RIGHT KNEE: Primary | ICD-10-CM

## 2025-04-15 PROCEDURE — 97110 THERAPEUTIC EXERCISES: CPT | Mod: GP

## 2025-04-15 NOTE — PROGRESS NOTES
"Patient Name: Karly Milton  MRN: [id number]                              Current Problem  1. Chronic pain of right knee            Insurance  MEDICARE/ ANTHEM 2410($0 USED) COPAY 0 (MET)  COVERAGE 80/100 OOP 0 ANTHEM TO FOLLOW MEDICARE  NO AUTH REQ   Visit#8   1/4 in new POC  Subjective     General  Pt enters today's visit endorsing an increase in stiffness vs last visit. Denies any issues with updated HEP   Precautions    Pain  0/10    Objective     Treatments:    REX_ 5'   Supine heel slide w/ strap:  10x3-5s   Supine HS stretch w/OP in to knee extension: 10x10s  Staggered STS:  2x15 (11# 2nd set)   Power step ups: 2x10 6\"   GTB hip abd stepout:  2x10 cosme   GTB standing hip flexion:  2x10 cosme   Staggered bridge:  2x10   Freemotion TKE :  2x15 12.5#       OP EDUCATION/HEP:  No changes to HEP this date s/t new progressions given last Thursday     Assessment   Good tolerance to activities today. Pt entered with increased stiffness but loosened up with ANGLE and heel slides. Cont'd with hip and knee strengthening, adding weight to staggered STS. Introduced staggered bridge to focus more on building R hamstring strength which initially created some low back discomfort. Increased resistance with several activities which induced expected muscle fatigue. No increase in pain at EOS. Recommend continued skilled PT to progress strength and ROM in order to facilitate goal achievement and return to PLOF.     Plan     Continue per POC. Cont with HS flexibility, knee/hip PREs   "

## 2025-04-20 ENCOUNTER — PATIENT MESSAGE (OUTPATIENT)
Dept: ORTHOPEDIC SURGERY | Facility: CLINIC | Age: 75
End: 2025-04-20
Payer: MEDICARE

## 2025-04-20 DIAGNOSIS — Z96.651 AFTERCARE FOLLOWING RIGHT KNEE JOINT REPLACEMENT SURGERY: ICD-10-CM

## 2025-04-20 DIAGNOSIS — Z47.1 AFTERCARE FOLLOWING RIGHT KNEE JOINT REPLACEMENT SURGERY: ICD-10-CM

## 2025-04-21 RX ORDER — CYCLOBENZAPRINE HCL 5 MG
5 TABLET ORAL NIGHTLY
Qty: 30 TABLET | Refills: 0 | Status: SHIPPED | OUTPATIENT
Start: 2025-04-21

## 2025-04-22 ENCOUNTER — TREATMENT (OUTPATIENT)
Dept: PHYSICAL THERAPY | Facility: CLINIC | Age: 75
End: 2025-04-22
Payer: MEDICARE

## 2025-04-22 DIAGNOSIS — G89.29 CHRONIC PAIN OF RIGHT KNEE: Primary | ICD-10-CM

## 2025-04-22 DIAGNOSIS — M25.561 CHRONIC PAIN OF RIGHT KNEE: Primary | ICD-10-CM

## 2025-04-22 PROCEDURE — 97110 THERAPEUTIC EXERCISES: CPT | Mod: GP,CQ

## 2025-04-22 ASSESSMENT — PAIN - FUNCTIONAL ASSESSMENT: PAIN_FUNCTIONAL_ASSESSMENT: 0-10

## 2025-04-22 ASSESSMENT — PAIN SCALES - GENERAL: PAINLEVEL_OUTOF10: 0 - NO PAIN

## 2025-04-22 NOTE — PROGRESS NOTES
"                                                                 Physical Therapy Treatment  Patient Name: Kraly Milton  MRN: [id number]  Date 4/22/2025  Time in 1130  Time out 1215  Treatment Time 45 min  Therapeutic Exercise 45 min        Current Problem  1. Chronic pain of right knee  Follow Up In Physical Therapy          Insurance  MEDICARE/ ANTHEM 2410($0 USED) COPAY 0 (MET)  COVERAGE 80/100 OOP 0 ANTHEM TO FOLLOW MEDICARE  NO AUTH REQ   Visit#9  2/4 in new POC  Subjective     General  Patient reports continued knee stiffness.    Precautions    Pain  0/10    Objective     Treatments:    ANGLE  5'   Supine heel slide w/ strap:  10x3-5s   Supine HS stretch w/OP in to knee extension: 10x10s  Staggered STS:  2x15 (11# 2nd set)   Power step ups: 2x10 6\"   GTB hip abd stepout:  2x10 cosme   GTB standing hip flexion:  2x10 cosme   Staggered bridge:  2x10   Freemotion TKE :  2x15 12.5#       OP EDUCATION/HEP:    Assessment   Patient tolerated treatment well with decreased stiffness following treatment. Continues to be challenged by current treatment plan. Able to maintain balance during standing balance activities. Will continue to benefit from PT to improve knee strength and improve tolerance to ADL's.    Plan     Continue with knee strengthening and ROM as tolerated   "

## 2025-04-29 ENCOUNTER — TREATMENT (OUTPATIENT)
Dept: PHYSICAL THERAPY | Facility: CLINIC | Age: 75
End: 2025-04-29
Payer: MEDICARE

## 2025-04-29 DIAGNOSIS — G89.29 CHRONIC PAIN OF RIGHT KNEE: Primary | ICD-10-CM

## 2025-04-29 DIAGNOSIS — M25.561 CHRONIC PAIN OF RIGHT KNEE: Primary | ICD-10-CM

## 2025-04-29 PROCEDURE — 97110 THERAPEUTIC EXERCISES: CPT | Mod: GP,CQ

## 2025-04-29 ASSESSMENT — PAIN SCALES - GENERAL: PAINLEVEL_OUTOF10: 0 - NO PAIN

## 2025-04-29 ASSESSMENT — PAIN - FUNCTIONAL ASSESSMENT: PAIN_FUNCTIONAL_ASSESSMENT: 0-10

## 2025-04-29 NOTE — PROGRESS NOTES
"                                                                 Physical Therapy Treatment  Patient Name: Karly Milton  MRN: [id number]  Date 4/29/2025  Time in 1128  Time out 1215  Treatment Time 47 min  Therapeutic Exercise 47 min        Current Problem  1. Chronic pain of right knee  Follow Up In Physical Therapy          Insurance  MEDICARE/ ANTHEM 2410($0 USED) COPAY 0 (MET)  COVERAGE 80/100 OOP 0 ANTHEM TO FOLLOW MEDICARE  NO AUTH REQ   Visit#10  3/4 in new [facility]  Subjective     General  Patient reports continued knee stiffness.    Precautions    Pain  0/10    Objective     Treatments:    ANGLE  5'   Supine heel slide w/ strap:  10x3-5s   Supine HS stretch w/OP in to knee extension: 10x10s  Staggered STS:  2x15 10#  Power step ups: 2x10 6\"   GTB hip abd stepout:  2x10 cosme   GTB standing hip flexion:  2x10 cosme   Staggered bridge:  2x10   Freemotion TKE :  2x15 12.5#   Step downs 4\" x10      OP EDUCATION/HEP:    Assessment   Patient tolerated treatment well with decreased stiffness following treatment. Continues to be challenged by step ups. Will continue to benefit from PT to improve knee strength and improve tolerance to ADL's.    Plan     Continue with knee strengthening and ROM as tolerated   "

## 2025-05-05 ENCOUNTER — TREATMENT (OUTPATIENT)
Dept: PHYSICAL THERAPY | Facility: CLINIC | Age: 75
End: 2025-05-05
Payer: MEDICARE

## 2025-05-05 DIAGNOSIS — M25.561 CHRONIC PAIN OF RIGHT KNEE: Primary | ICD-10-CM

## 2025-05-05 DIAGNOSIS — G89.29 CHRONIC PAIN OF RIGHT KNEE: Primary | ICD-10-CM

## 2025-05-05 PROCEDURE — 97110 THERAPEUTIC EXERCISES: CPT | Mod: GP

## 2025-05-05 NOTE — PROGRESS NOTES
Patient Name: Karly Milton  MRN: [id number]  Time Calculation  Start Time: 1131  Stop Time: 1154  Time Calculation (min): 23 min     PT Therapeutic Procedures Time Entry  Therapeutic Exercise Time Entry: 23                     Current Problem  1. Chronic pain of right knee            Insurance  MEDICARE/ ANTHEM 2410($0 USED) COPAY 0 (MET)  COVERAGE 80/100 OOP 0 ANTHEM TO FOLLOW MEDICARE  NO AUTH REQ   Visit#10  4/4 in new [facility]  Subjective     General  Pt reports she is feeling good and feeling ready to be discharged from caseload. Overall feels comfortable and confident with her HEP. Still endorses some soreness with prolonged ambulation  Precautions    Pain  0/10    Objective   LEFS:  75    Knee Musculoskeletal Exam    Strength    Right      Extension: 5/5.       Flexion: 4+/5.     Left      Extension: 5/5.       Flexion: 5/5.     Hip Abd:    R 4/5  L 4/5     Treatments:      Supine heel slide w/ strap:  10x3-5s   Seated HS stretch w/OP in to knee extension:  10x5s   Goal assessment/re-check:  15'     OP EDUCATION/HEP:  Discussed progress towards goals, continuation of care. Pt and PT in mutual agreement to discharge her from caseload to be independent with her home program.      Provided pt with BTB to progress hip exercises at home. She reports she has been consistent with utilizing HEPGO to perform her home exercises. Encouraged her that effort required to make progress is more than that required to maintain. Discussed that even with 2x/week activity she can still see maintenance of her functional gains.     Assessment   Pt seen for re-assessment today. Overall she has made excellent progress from initial evaluation. Noted improvements in strength, knee flexion/extension ROM, LEFS outcome measure score, and pain levels. She does still have some mild restriction in to flexion and extension, though she is only lacking 2-3 degrees before I would consider her WFL. Her strength in to knee flexion is not yet  sufficient to meet her goal but has progressed from last RPT. Overall there is mutual agreement to discharge her from caseload s/t progress. Encouraged her to reach out with any future questions or concerns.       Pt to be IND with HEP (MET, still progressive)   2. Pt R knee flexion AROM to 120 degrees for improved ability to perform functional transfers (progressing)   3. Pt knee extension AROM to 0 degrees for improved ability to ambulate without gait abnormality (progressing)   4. Pt R knee MMT to 5/5 to demonstrate improved ability to perform heavier home/yard care tasks, and improved tolerance to stairs/squatting (Progressing)   5. Pt to self report LEFS score of greater than or equal to 47 to demonstrate statistically significant improvement in function. (MET)   6. Pt to be able to navigate stairs with reciprocal pattern and HR as needed for improved ability to traverse home and community settings (MET)   7. Pt to be able to ambulate community level distances over various terrains and degrees of surface compliance LASHON with LRAD in order to demonstrate restoration of PLOF (MET)   Plan     Plan to discharge pt from caseload s/t to progress. She will be independent with her home program moving forward.

## 2025-05-06 ENCOUNTER — APPOINTMENT (OUTPATIENT)
Dept: PHYSICAL THERAPY | Facility: CLINIC | Age: 75
End: 2025-05-06
Payer: MEDICARE

## 2025-05-06 DIAGNOSIS — G89.29 CHRONIC PAIN OF RIGHT KNEE: Primary | ICD-10-CM

## 2025-05-06 DIAGNOSIS — M25.561 CHRONIC PAIN OF RIGHT KNEE: Primary | ICD-10-CM

## 2025-05-13 ENCOUNTER — HOSPITAL ENCOUNTER (OUTPATIENT)
Dept: RADIOLOGY | Facility: CLINIC | Age: 75
Discharge: HOME | End: 2025-05-13
Payer: MEDICARE

## 2025-05-13 ENCOUNTER — OFFICE VISIT (OUTPATIENT)
Dept: ORTHOPEDIC SURGERY | Facility: CLINIC | Age: 75
End: 2025-05-13
Payer: MEDICARE

## 2025-05-13 DIAGNOSIS — Z47.1 AFTERCARE FOLLOWING RIGHT KNEE JOINT REPLACEMENT SURGERY: Primary | ICD-10-CM

## 2025-05-13 DIAGNOSIS — Z96.651 AFTERCARE FOLLOWING RIGHT KNEE JOINT REPLACEMENT SURGERY: ICD-10-CM

## 2025-05-13 DIAGNOSIS — Z47.1 AFTERCARE FOLLOWING RIGHT KNEE JOINT REPLACEMENT SURGERY: ICD-10-CM

## 2025-05-13 DIAGNOSIS — Z96.651 AFTERCARE FOLLOWING RIGHT KNEE JOINT REPLACEMENT SURGERY: Primary | ICD-10-CM

## 2025-05-13 PROCEDURE — 73562 X-RAY EXAM OF KNEE 3: CPT | Mod: RT

## 2025-05-13 PROCEDURE — 99212 OFFICE O/P EST SF 10 MIN: CPT | Performed by: ORTHOPAEDIC SURGERY

## 2025-05-13 PROCEDURE — 99024 POSTOP FOLLOW-UP VISIT: CPT | Performed by: ORTHOPAEDIC SURGERY

## 2025-05-13 PROCEDURE — 73562 X-RAY EXAM OF KNEE 3: CPT | Mod: RIGHT SIDE | Performed by: ORTHOPAEDIC SURGERY

## 2025-05-13 RX ORDER — TIZANIDINE 4 MG/1
4 TABLET ORAL NIGHTLY PRN
Qty: 30 TABLET | Refills: 0 | Status: SHIPPED | OUTPATIENT
Start: 2025-05-13 | End: 2025-06-12

## 2025-05-13 RX ORDER — METHYLPREDNISOLONE 4 MG/1
TABLET ORAL
Qty: 21 TABLET | Refills: 0 | Status: SHIPPED | OUTPATIENT
Start: 2025-05-13

## 2025-05-13 NOTE — PROGRESS NOTES
"  Subjective    Patient ID: Karly \"DAYANNA\"    Chief Complaint:   Chief Complaint   Patient presents with    Right Knee - Follow-up     TKR 2/24/25  Xrays today       History of present illness    74-year-old female presented clinic today for her 2 and half/3-month postop follow-up status post right total knee replacement.  Overall doing well.  She states that she still gets occasional twinges in hypersensitivities over the anterior lateral aspect of the right knee.  For the most part doing well.  Continuing with outpatient physical therapy.  No instability reported.  She notices improvement with overall activity.  She gets some mild itching over the anterior aspect of the right knee.  No fever or chills reported.      Past medical , Surgical, Family and social history reviewed.      Physical exam  General: No acute distress and breathing comfortably.  Patient is pleasant and cooperative with the examination.    Extremity  Right knee is neurovascular intact.  She has good range of motion 0 to 110 degrees today.  Moderate edema of the right knee.  No calf tenderness.  Mild calf swelling.  No sign of DVT.  Incision well-healed approximated.  No sign of infection.  No instability on exam.  Mild dyshidrotic rash over the anterior aspect of the right knee without erythema.  Mild tenderness palpation of the IT band.  Mild tenderness palpation over the lateral hamstring.    Diagnostics  Please see dictated x-ray report  Imaging  No results found.    Cardiology, Vascular, and Other Imaging  No other imaging results found for the past 7 days       Procedure  [ none]    Assessment  Status post right total knee replacement    Treatment plan  1.  She will continue weightbearing activity as tolerated  2.  Continue with outpatient physical therapy.  Prescription Medrol Dosepak sent to the pharmacy for acute inflammation and her itching.  Prescription tizanidine sent to the pharmacy to help with sleeping and spasms  3.  She will " follow-up with us in 3 months for reevaluation with new x-rays.  4.  All of the patient's questions were answered.    Orders Placed This Encounter    XR knee right 3 views       This note was prepared using voice recognition software.  The details of this note are correct and have been reviewed, and corrected to the best of my ability.  Some grammatical areas may persist related to the Dragon software    Larry Tejeda PA-C, [facility]  [facility]  [facility] [place]    [phone]

## 2025-06-03 ENCOUNTER — TELEPHONE (OUTPATIENT)
Dept: PRIMARY CARE | Facility: CLINIC | Age: 75
End: 2025-06-03
Payer: MEDICARE

## 2025-06-04 ENCOUNTER — APPOINTMENT (OUTPATIENT)
Dept: PRIMARY CARE | Facility: CLINIC | Age: 75
End: 2025-06-04
Payer: MEDICARE

## 2025-06-04 VITALS
TEMPERATURE: 97.8 F | SYSTOLIC BLOOD PRESSURE: 128 MMHG | WEIGHT: 227 LBS | HEART RATE: 69 BPM | OXYGEN SATURATION: 97 % | DIASTOLIC BLOOD PRESSURE: 66 MMHG | HEIGHT: 67 IN | BODY MASS INDEX: 35.63 KG/M2

## 2025-06-04 DIAGNOSIS — I48.0 PAROXYSMAL ATRIAL FIBRILLATION (MULTI): ICD-10-CM

## 2025-06-04 DIAGNOSIS — Z00.00 MEDICARE ANNUAL WELLNESS VISIT, SUBSEQUENT: Primary | ICD-10-CM

## 2025-06-04 DIAGNOSIS — Z00.00 ROUTINE GENERAL MEDICAL EXAMINATION AT HEALTH CARE FACILITY: ICD-10-CM

## 2025-06-04 DIAGNOSIS — M79.89 LEG SWELLING: ICD-10-CM

## 2025-06-04 PROCEDURE — G0439 PPPS, SUBSEQ VISIT: HCPCS | Performed by: INTERNAL MEDICINE

## 2025-06-04 PROCEDURE — 1160F RVW MEDS BY RX/DR IN RCRD: CPT | Performed by: INTERNAL MEDICINE

## 2025-06-04 PROCEDURE — 1159F MED LIST DOCD IN RCRD: CPT | Performed by: INTERNAL MEDICINE

## 2025-06-04 PROCEDURE — 3078F DIAST BP <80 MM HG: CPT | Performed by: INTERNAL MEDICINE

## 2025-06-04 PROCEDURE — 3074F SYST BP LT 130 MM HG: CPT | Performed by: INTERNAL MEDICINE

## 2025-06-04 ASSESSMENT — ENCOUNTER SYMPTOMS
CONSTITUTIONAL NEGATIVE: 1
RESPIRATORY NEGATIVE: 1
EYES NEGATIVE: 1
NEUROLOGICAL NEGATIVE: 1
ENDOCRINE NEGATIVE: 1
PSYCHIATRIC NEGATIVE: 1
HEMATOLOGIC/LYMPHATIC NEGATIVE: 1

## 2025-06-04 NOTE — ASSESSMENT & PLAN NOTE
Stat venous duplex rule out DVT.  Lung exam is fine she denies any chest pains or shortness of breath.  She will continue her blood pressure medicines.  She has history of mild CAD.  Also 1 time there was A-fib episode but no reoccurrence she uses her smart watch and can monitor herself for any atrial fibs.  Follow-up with cardiology as well.

## 2025-06-04 NOTE — PROGRESS NOTES
"Subjective   Reason for Visit: Karly Milton is an 74 y.o. female here for a Medicare Wellness visit.          Reviewed all medications by prescribing practitioner or clinical pharmacist (such as prescriptions, OTCs, herbal therapies and supplements) and documented in the medical record.    HPI  Patient here for Medicare wellness exam she had knee surgery done and is recovering from it.  She did have some itching and redness and was given steroids for that she does have swelling now more in the left leg whereas joint replacement was on the right leg    Patient Care Team:  Sheryl Ga MD as PCP - General  [facility]  Lul Camarillo MD as Cardiologist (Interventional Cardiology)  Miles Mon MD as Consulting Physician (Orthopaedic Surgery)     Review of Systems   Constitutional: Negative.    HENT: Negative.     Eyes: Negative.    Respiratory: Negative.     Cardiovascular:  Positive for leg swelling.   Endocrine: Negative.    Genitourinary: Negative.    Neurological: Negative.    Hematological: Negative.    Psychiatric/Behavioral: Negative.     All other systems reviewed and are negative.      Objective   Vitals:  /66   Pulse 69   Temp 36.6 °C (97.8 °F) (Temporal)   Ht 1.689 m (5' 6.5\")   Wt 103 kg (227 lb)   SpO2 97%   BMI 36.09 kg/m²       Physical Exam  Vitals reviewed.   Constitutional:       Appearance: Normal appearance. She is obese.   HENT:      Head: Normocephalic and atraumatic.   Eyes:      Conjunctiva/sclera: Conjunctivae normal.   Neck:      Vascular: No carotid bruit.   Cardiovascular:      Rate and Rhythm: Normal rate and regular rhythm.      Pulses: Normal pulses.      Heart sounds: Normal heart sounds.   Pulmonary:      Effort: Pulmonary effort is normal.      Breath sounds: Normal breath sounds.   Musculoskeletal:      Right lower leg: No edema.      Left lower leg: Edema present.      Comments: Left leg " swollen more than right leg.   Neurological:      General: No focal deficit present.      Mental Status: She is alert and oriented to person, place, and time. Mental status is at baseline.      Cranial Nerves: No cranial nerve deficit.   Psychiatric:         Mood and Affect: Mood normal.         Behavior: Behavior normal.         Thought Content: Thought content normal.       Assessment & Plan  Routine general medical examination at health care facility         Leg swelling    Orders:  •  Vascular US lower extremity venous duplex bilateral; Future    Paroxysmal atrial fibrillation (Multi)         Medicare annual wellness visit, subsequent  Stat venous duplex rule out DVT.  Lung exam is fine she denies any chest pains or shortness of breath.  She will continue her blood pressure medicines.  She has history of mild CAD.  Also 1 time there was A-fib episode but no reoccurrence she uses her smart watch and can monitor herself for any atrial fibs.  Follow-up with cardiology as well.

## 2025-06-05 ENCOUNTER — HOSPITAL ENCOUNTER (OUTPATIENT)
Dept: RADIOLOGY | Facility: HOSPITAL | Age: 75
Discharge: HOME | End: 2025-06-05
Payer: MEDICARE

## 2025-06-05 DIAGNOSIS — M79.89 LEG SWELLING: ICD-10-CM

## 2025-06-05 PROCEDURE — 93970 EXTREMITY STUDY: CPT

## 2025-06-10 ENCOUNTER — APPOINTMENT (OUTPATIENT)
Dept: PRIMARY CARE | Facility: CLINIC | Age: 75
End: 2025-06-10
Payer: MEDICARE

## 2025-07-09 ENCOUNTER — TELEPHONE (OUTPATIENT)
Dept: PRIMARY CARE | Facility: CLINIC | Age: 75
End: 2025-07-09
Payer: MEDICARE

## 2025-07-14 ENCOUNTER — OFFICE VISIT (OUTPATIENT)
Dept: OBGYN CLINIC | Age: 75
End: 2025-07-14
Payer: MEDICARE

## 2025-07-14 VITALS
HEART RATE: 84 BPM | WEIGHT: 230 LBS | SYSTOLIC BLOOD PRESSURE: 124 MMHG | DIASTOLIC BLOOD PRESSURE: 72 MMHG | BODY MASS INDEX: 34.86 KG/M2 | HEIGHT: 68 IN

## 2025-07-14 DIAGNOSIS — Z01.419 WELL WOMAN EXAM WITH ROUTINE GYNECOLOGICAL EXAM: Primary | ICD-10-CM

## 2025-07-14 DIAGNOSIS — Z12.31 VISIT FOR SCREENING MAMMOGRAM: ICD-10-CM

## 2025-07-14 DIAGNOSIS — L90.0 LICHEN SCLEROSUS ET ATROPHICUS: ICD-10-CM

## 2025-07-14 PROCEDURE — 1159F MED LIST DOCD IN RCRD: CPT | Performed by: OBSTETRICS & GYNECOLOGY

## 2025-07-14 PROCEDURE — G0101 CA SCREEN;PELVIC/BREAST EXAM: HCPCS | Performed by: OBSTETRICS & GYNECOLOGY

## 2025-07-14 RX ORDER — CLOBETASOL PROPIONATE 0.5 MG/G
OINTMENT TOPICAL
Qty: 60 G | Refills: 2 | Status: SHIPPED | OUTPATIENT
Start: 2025-07-14

## 2025-07-14 SDOH — ECONOMIC STABILITY: FOOD INSECURITY: WITHIN THE PAST 12 MONTHS, YOU WORRIED THAT YOUR FOOD WOULD RUN OUT BEFORE YOU GOT MONEY TO BUY MORE.: NEVER TRUE

## 2025-07-14 SDOH — ECONOMIC STABILITY: FOOD INSECURITY: WITHIN THE PAST 12 MONTHS, THE FOOD YOU BOUGHT JUST DIDN'T LAST AND YOU DIDN'T HAVE MONEY TO GET MORE.: NEVER TRUE

## 2025-07-14 ASSESSMENT — PATIENT HEALTH QUESTIONNAIRE - PHQ9
SUM OF ALL RESPONSES TO PHQ QUESTIONS 1-9: 0
2. FEELING DOWN, DEPRESSED OR HOPELESS: NOT AT ALL
1. LITTLE INTEREST OR PLEASURE IN DOING THINGS: NOT AT ALL
SUM OF ALL RESPONSES TO PHQ QUESTIONS 1-9: 0

## 2025-07-14 NOTE — PROGRESS NOTES
SUBJECTIVE:   74 y.o.  female here for annual exam. Pt . Pt does report increasing vulvar itching that is worse at night.     Review of Systems:  General ROS: negative  Psychological ROS: negative  ENT ROS: negative  Endocrine ROS: negative  Respiratory ROS: no cough, shortness of breath, or wheezing  Cardiovascular ROS: no chest pain or dyspnea on exertion  Gastrointestinal ROS: no abdominal pain, change in bowel habits, or black or bloody stools  Genito-Urinary ROS: no dysuria, trouble voiding, or hematuria  Musculoskeletal ROS: negative  Neurological ROS: no TIA or stroke symptoms  Dermatological ROS: negative    OBJECTIVE:   /72   Pulse 84   Ht 1.727 m (5' 8\")   Wt 104.3 kg (230 lb)   BMI 34.97 kg/m²     Physical Exam:  CONSTITUTIONAL: She appears well nourished and developed   NEUROLOGIC: Alert and oriented to time, place and person  NECK: no thyroidmegaly  BREASTS: Bilateral breasts without masses, lesions or nipple discharge  LUNGS: Clear to ascultation bilaterally  CVS: regular rate and rhythm  LYMPHATIC: No palpable lymph nodes  ABDOMEN: benign, soft, nontender, no masses. No liver or splenic organomegaly. No evidence of abdominal or inguinal hernia. No indication for occult blood testing  SKIN: normal texture and tone, no lesions  NEURO: normal tone, no hyperreflexia, 1+DTRs throughout    Pelvic Exam:   EFG: normal external genitalia, lichen sclerosis noted  URETHRAL MEATUS: normal size, no diverticula   URETHRA: normal appearing without diverticula or lesions  BLADDER:  No masses or tenderness  VAGINA: normal rugae, no discharge   CERVIX: parous, no lesions  UTERUS: uterus is normal size, shape, consistency and nontender   ADNEXA: normal adnexa in size, nontender and no masses.  PERINEUM: normal appearing without lesions or masses  RECTUM: no hemorrhoids or rectal masses.     ASSESSMENT:   Routine gynecologic exam  Breast cancer screening  Lichen sclerosis    PLAN:   LPS  NILM HPV

## 2025-08-13 ENCOUNTER — OFFICE VISIT (OUTPATIENT)
Dept: ORTHOPEDIC SURGERY | Facility: CLINIC | Age: 75
End: 2025-08-13
Payer: MEDICARE

## 2025-08-13 ENCOUNTER — HOSPITAL ENCOUNTER (OUTPATIENT)
Dept: RADIOLOGY | Facility: CLINIC | Age: 75
Discharge: HOME | End: 2025-08-13
Payer: MEDICARE

## 2025-08-13 DIAGNOSIS — Z96.651 STATUS POST TOTAL RIGHT KNEE REPLACEMENT: ICD-10-CM

## 2025-08-13 DIAGNOSIS — M17.11 PRIMARY OSTEOARTHRITIS OF RIGHT KNEE: ICD-10-CM

## 2025-08-13 DIAGNOSIS — M17.11 PRIMARY OSTEOARTHRITIS OF RIGHT KNEE: Primary | ICD-10-CM

## 2025-08-13 PROCEDURE — 99213 OFFICE O/P EST LOW 20 MIN: CPT | Performed by: ORTHOPAEDIC SURGERY

## 2025-08-13 PROCEDURE — 99212 OFFICE O/P EST SF 10 MIN: CPT | Performed by: ORTHOPAEDIC SURGERY

## 2025-08-13 PROCEDURE — 73562 X-RAY EXAM OF KNEE 3: CPT | Mod: RIGHT SIDE | Performed by: ORTHOPAEDIC SURGERY

## 2025-08-13 PROCEDURE — 73562 X-RAY EXAM OF KNEE 3: CPT | Mod: RT

## 2025-12-04 ENCOUNTER — APPOINTMENT (OUTPATIENT)
Dept: PRIMARY CARE | Facility: CLINIC | Age: 75
End: 2025-12-04
Payer: MEDICARE

## 2026-02-04 ENCOUNTER — APPOINTMENT (OUTPATIENT)
Dept: CARDIOLOGY | Facility: CLINIC | Age: 76
End: 2026-02-04
Payer: MEDICARE

## (undated) DEVICE — SUTURE, MONOCRYL, 3-0, 36 IN, CT-1, UNDYED

## (undated) DEVICE — BLADE, STRYKER, 12 X 76 X 1.00L, TOOTH

## (undated) DEVICE — WRAP, DEMAYO, LEG, STERILE

## (undated) DEVICE — CUFF, TOURNIQUET, 30 X 4, DUAL PORT/SNGL BLADDER, DISP, LF

## (undated) DEVICE — TOWEL PACK, STERILE, 16X24, XRAY DETECTABLE, BLUE, 4/PK

## (undated) DEVICE — ADHESIVE, SKIN, DERMABOND ADVANCED, 15CM, PEN-STYLE

## (undated) DEVICE — MANIFOLD, 4 PORT NEPTUNE STANDARD

## (undated) DEVICE — SUTURE, VICRYL, 1, 36 IN, V-34, VIOLET

## (undated) DEVICE — STRAP, ARM BOARD, 32 X 1.5

## (undated) DEVICE — BLADE, STRYKER, OSC, 19 X 90 X 1.27G

## (undated) DEVICE — SOLUTION, INJECTION, USP, SODIUM CHLORIDE 0.9%, .9, NACL, 1000 ML, BAG

## (undated) DEVICE — MAT, FLOOR, STANDARD FLUID BARRIER, 32X44, GREEN

## (undated) DEVICE — WOUND SYSTEM, DEBRIDEMENT & CLEANING, O.R DUOPAK

## (undated) DEVICE — PREP, IODOPHOR, W/ALCOHOL, DURAPREP, W/APPLICATOR, 26 CC

## (undated) DEVICE — BATH BLANKET STERILE

## (undated) DEVICE — STRAP, VELCRO, BODY, 4 X 60IN, NS

## (undated) DEVICE — BOWL, BASIN, 32 OZ, STERILE

## (undated) DEVICE — CEMENT, MIXEVAC III, 10S BOWL, KNEES

## (undated) DEVICE — BANDAGE, ELASTIC, 6 X 10YD, BEIGE, LF

## (undated) DEVICE — Device

## (undated) DEVICE — BLADE, PATELLA REAMER, W/PILOT HOLE, 32MM

## (undated) DEVICE — IMMOBILIZER, KNEE, 19IN, ADJUSTABLE FOAM, W/ ELASTIC STRAPS

## (undated) DEVICE — TUBING, IRRIGATION, HIGH FLOW, HAND PIECE SET

## (undated) DEVICE — CAUTERY, PENCIL, PUSH BUTTON, SMOKE EVAC, 70MM

## (undated) DEVICE — HOOD, SURGICAL, FLYTE, T7

## (undated) DEVICE — GLOVE, SURGICAL, PROTEXIS PI , 7.5, PF, LF

## (undated) DEVICE — PADDING, CAST, SPECIALIST, 6 IN X 4 YD, STERILE

## (undated) DEVICE — SUTURE, MONOCRYL, 4-0, 27 IN, PS-2, UNDYED

## (undated) DEVICE — SOLUTION, IRRIGATION, STERILE WATER, 1000 ML, POUR BOTTLE